# Patient Record
Sex: MALE | Race: WHITE | Employment: OTHER | URBAN - METROPOLITAN AREA
[De-identification: names, ages, dates, MRNs, and addresses within clinical notes are randomized per-mention and may not be internally consistent; named-entity substitution may affect disease eponyms.]

---

## 2019-02-14 ENCOUNTER — TELEPHONE (OUTPATIENT)
Dept: NEUROLOGY | Age: 79
End: 2019-02-14

## 2019-02-14 NOTE — TELEPHONE ENCOUNTER
----- Message from Kathy Kinney sent at 2/14/2019  1:20 PM EST -----  Regarding: Dr. Jacey Hoskins  Pt's wife(Anca Messer) requested a call from the nurse regarding issues with the pt, and seeing the retina specialist. Best contact number [phone].

## 2019-02-15 NOTE — TELEPHONE ENCOUNTER
----- Message from Lyle Vazquez sent at 2/15/2019  8:14 AM EST -----  Regarding: Dr. Kayce Stroud  The patient's wife Emir Lo is letting the doctor know that the patient had a stroke Tuesday and is in the [facility] Neurology ICU. [phone]

## 2019-03-12 ENCOUNTER — OFFICE VISIT (OUTPATIENT)
Dept: NEUROLOGY | Age: 79
End: 2019-03-12

## 2019-03-12 VITALS
BODY MASS INDEX: 25.73 KG/M2 | DIASTOLIC BLOOD PRESSURE: 64 MMHG | WEIGHT: 190 LBS | SYSTOLIC BLOOD PRESSURE: 138 MMHG | TEMPERATURE: 98.7 F | HEIGHT: 72 IN | HEART RATE: 52 BPM | OXYGEN SATURATION: 99 % | RESPIRATION RATE: 16 BRPM

## 2019-03-12 DIAGNOSIS — I61.1 NONTRAUMATIC CORTICAL HEMORRHAGE OF RIGHT CEREBRAL HEMISPHERE (HCC): Primary | ICD-10-CM

## 2019-03-12 NOTE — PROGRESS NOTES
[facility] Neurology Clinics and [address] at [facility] Neurology Clinics at [facility] [address] [place], [address] [facility] [address] [facility], [address] [place] [address]  [phone] Office  [phone] Facsimile           Referring:     Chief Complaint   Patient presents with    Cerebrovascular Accident     last seen 2016. Stroke happened on 2/11/19, admitted to [facility] on 2/14/19 and was in neuro ICU until 2/19/19. Working with Fredy Robert Stuart now     67-year-old left-handed gentleman who presents today for evaluation of what he calls stroke. Review of the electronic medical record finds this patient was last seen in February 2016 for attention deficit disorder. He tried some medication which made him feel poorly and he stopped that. He was hospitalized at [facility] Feb 14 and hospitalized until Feb 19 and he had ICH. He has been working with [facility]. His wife provides most of the history today. She notes that he was acting strangely around the 11th or so of February. She said he just was not acting himself and said that he could not see well. They went to see their eye doctor who referred him to a retina specialist.  That appointment was for about 2 weeks later. They were talking with some friends about this and his behavior was getting worse. They then went to see Dr. Maddison Kraft on the 14th and called our office as well and Dr. Maddison Kraft in our office as well as it for him to go to the emergency department and he went to the [facility] on the 14th. He was diagnosed with an intracerebral hemorrhage in the occipital lobe. He was transferred to [facility] where he stayed in the ICU for 5 days. He was seen by Dr. Brian Ochoa. He did not require surgery. While hospitalized he apparently underwent MRI of the brain and MRA of the intracranial circulation as well as a carotid Doppler.   He was said to have insignificant carotid stenosis. No reason was given for his hemorrhage however to his wife's recollection. They did follow-up with optometry and had visual fields and they bring that today. He has not had a follow-up head CT since being discharged. He has been getting home health speech physical and occupational therapy. His wife indicates they will be recommending outpatient therapy to start soon. She indicates his memory is worse. He is not had any recent fall. No chest pain or palpitations. No shortness of breath. Review of records kindly provided Dr. Maximilian Wright finds patient on aspirin 81 mg and tapering tobacco with rare falls. [facility] optometry records from Dr. Josie Worrell finds left homonymous bilateral field defects and she did include the visual field diagram.  She also indicates that he has type 2 diabetes with severe diabetic retinopathy and he came in to evaluate his visual field loss as he recently underwent hospitalization for stroke. Past Medical History:   Diagnosis Date    Depression     Diabetes ([facility] [address] [facility]     Falls 4/26/2013    Hearing loss 4/26/2013    [facility] 02/11/[facility]    Memory loss 4/26/2013    Ringing in ears 4/26/2013    Stroke [facility])        Past Surgical History:   Procedure Laterality Date    HX CHOLECYSTECTOMY      NEUROLOGICAL PROCEDURE UNLISTED         Current Outpatient Medications   Medication Sig Dispense Refill    lisinopril (PRINIVIL, ZESTRIL) 20 mg tablet Take  by mouth daily.  aspirin delayed-release 81 mg tablet Take  by mouth daily.  insulin lispro (HUMALOG) 100 unit/mL injection 4 Units by SubCUTAneous route as needed.  insulin glargine (LANTUS) 100 unit/mL injection 11 Units by SubCUTAneous route nightly.           Allergies   Allergen Reactions    Bactrim [Sulfamethoprim] Unknown (comments)    Dilantin [Phenytoin Sodium Extended] Unknown (comments)    Doxycycline Unknown (comments)    Lipitor [Atorvastatin] Myalgia  Pravastatin Myalgia    Sertraline Unknown (comments)    Sulfur Diarrhea    Tegretol [Carbamazepine] Unknown (comments)    Zocor [Simvastatin] Myalgia       Social History     Tobacco Use    Smoking status: Current Every Day Smoker    Smokeless tobacco: Never Used   Substance Use Topics    Alcohol use: No    Drug use: No       Family History   Problem Relation Age of Onset    Dementia Maternal Grandfather     No Known Problems Mother     No Known Problems Father        Review of Systems  Pertinent positives and negatives as noted with remainder of comprehensive review negative    Examination  Visit Vitals  Ht 6' (1.829 m)   Wt 86.2 kg (190 lb)   BMI 25.77 kg/m²     Pleasant well-dressed gentleman with no scleral icterus. His heart is regular. His pulses are symmetrical.  Oropharynx is clear. He has no edema of the lower extremities. He does wear hearing aids. Neurologically he is awake alert oriented and conversant. He has normal speech and language. Follows all commands. He does need some hints to recall the president and the devious president's name. He has full versions. Face is symmetrical.  Tongue and palate are midline. Visual fields are difficult due to attention. He has right upper extremity weakness with pronation and drifting. Left upper and lower extremity full to direct. Right upper extremity 4/5 throughout. Left lower extremity full. Reflexes depressed throughout. Finger-nose-finger done well    Impression/Plan  New hemorrhagic infarct as noted above and we will request records from [facility]. Request records from his home health therapy as well. Update CT scan for continued resolution of that blood    EEG for epileptiform abnormalities    Continue with therapies. Follow-up after studies    Saida Jason MD    This note was created using voice recognition software. Despite editing, there may be syntax errors.  This note will not be viewable in MyChart.

## 2019-03-12 NOTE — PROGRESS NOTES
Chief Complaint   Patient presents with    Cerebrovascular Accident     last seen 2016. Stroke happened on 2/11/19, admitted to [facility] on 2/14/19 and was in neuro ICU until 2/19/19.   Working with [facility] now       Records from Endocrine and Isidro Blankenship brought to appt

## 2019-03-12 NOTE — PATIENT INSTRUCTIONS

## 2019-03-15 ENCOUNTER — HOSPITAL ENCOUNTER (OUTPATIENT)
Dept: NEUROLOGY | Age: 79
Discharge: HOME | End: 2019-03-15
Attending: PSYCHIATRY & NEUROLOGY
Payer: MEDICARE

## 2019-03-15 ENCOUNTER — HOSPITAL ENCOUNTER (OUTPATIENT)
Dept: CT IMAGING | Age: 79
Discharge: HOME | End: 2019-03-15
Attending: PSYCHIATRY & NEUROLOGY
Payer: MEDICARE

## 2019-03-15 DIAGNOSIS — I61.1 NONTRAUMATIC CORTICAL HEMORRHAGE OF RIGHT CEREBRAL HEMISPHERE (HCC): ICD-10-CM

## 2019-03-15 PROCEDURE — 95816 EEG AWAKE AND DROWSY: CPT

## 2019-03-15 PROCEDURE — 70450 CT HEAD/BRAIN W/O DYE: CPT

## 2019-03-18 NOTE — PROCEDURES
Corby [facility] [address] [address]  EEG    Name:  Steven Mason  MR#:  [id number]  :  1940  ACCOUNT #:  [de-identified]  DATE OF SERVICE:  03/15/2019      REFERRING PROVIDER:  Belem Landrum    CLINICAL HISTORY:  EEG is requested on this 69year-old to evaluate for epileptiform abnormalities. MEDICATIONS:  Listed as lisinopril, insulin, aspirin. EEG REPORT:  This tracing is obtained during the awake state. During wakefulness, there are brief intermittent runs of posteriorly dominant and symmetric low-to-medium amplitude 9-cycle per second activities which attenuate with eye opening. Lower voltage faster frequency activities are seen symmetrically over the anterior head regions. Hyperventilation not performed secondary to his age and history of stroke. Intermittent photic stimulation little alters the tracing. Sleep is not attained. INTERPRETATION:  This EEG recorded during the awake state is normal.  No epileptiform abnormalities are seen.       Dg Saeed MD      [id number]  D:  2019 17:21  T:  2019 9:01  JOB #:  [id number]  CC:

## 2019-04-09 ENCOUNTER — OFFICE VISIT (OUTPATIENT)
Dept: NEUROLOGY | Age: 79
End: 2019-04-09

## 2019-04-09 VITALS
RESPIRATION RATE: 16 BRPM | HEART RATE: 52 BPM | BODY MASS INDEX: 27.2 KG/M2 | HEIGHT: 70 IN | DIASTOLIC BLOOD PRESSURE: 64 MMHG | OXYGEN SATURATION: 99 % | SYSTOLIC BLOOD PRESSURE: 144 MMHG | WEIGHT: 190 LBS | TEMPERATURE: 97.6 F

## 2019-04-09 DIAGNOSIS — I61.1 NONTRAUMATIC CORTICAL HEMORRHAGE OF RIGHT CEREBRAL HEMISPHERE (HCC): Primary | ICD-10-CM

## 2019-04-09 DIAGNOSIS — F02.818 LATE ONSET ALZHEIMER'S DISEASE WITH BEHAVIORAL DISTURBANCE (HCC): ICD-10-CM

## 2019-04-09 DIAGNOSIS — G30.1 LATE ONSET ALZHEIMER'S DISEASE WITH BEHAVIORAL DISTURBANCE (HCC): ICD-10-CM

## 2019-04-09 RX ORDER — DONEPEZIL HYDROCHLORIDE 5 MG/1
5 TABLET, FILM COATED ORAL DAILY
Qty: 30 TAB | Refills: 0 | Status: SHIPPED | OUTPATIENT
Start: 2019-04-09 | End: 2019-05-09

## 2019-04-09 RX ORDER — DONEPEZIL HYDROCHLORIDE 10 MG/1
10 TABLET, FILM COATED ORAL DAILY
Qty: 90 TAB | Refills: 3 | Status: SHIPPED | OUTPATIENT
Start: 2019-04-09 | End: 2020-04-22

## 2019-04-09 NOTE — PROGRESS NOTES
[facility] Neurology Clinics and [address] at [facility] Neurology Clinics at [address] [place], [address] [facility] [address] [facility], [address] [place] [address]  
[phone] Chief Complaint Patient presents with  Results  
  eeg, ct  
 
Current Outpatient Medications Medication Sig Dispense Refill  lisinopril (PRINIVIL, ZESTRIL) 20 mg tablet Take  by mouth daily.  insulin lispro (HUMALOG) 100 unit/mL injection 4 Units by SubCUTAneous route as needed.  insulin glargine (LANTUS) 100 unit/mL injection 11 Units by SubCUTAneous route nightly.  aspirin delayed-release 81 mg tablet Take  by mouth daily. Allergies Allergen Reactions  Bactrim [Sulfamethoprim] Unknown (comments)  Dilantin [Phenytoin Sodium Extended] Unknown (comments)  Doxycycline Unknown (comments)  Lipitor [Atorvastatin] Myalgia  Pravastatin Myalgia  Sertraline Unknown (comments)  Sulfur Diarrhea  Tegretol [Carbamazepine] Unknown (comments)  Zocor [Simvastatin] Myalgia Social History Tobacco Use  Smoking status: Current Every Day Smoker  Smokeless tobacco: Never Used Substance Use Topics  Alcohol use: No  
 Drug use: No  
 
Patient returns today for follow-up. I saw him after he was hospitalized intracerebral hemorrhage. Records have been requested but not yet admitted to the media section electronic medical record. EEG was performed and personally reviewed and normal.  CT scan demonstrates a resolving parenchymal hematoma in the right posterior parieto-occipital lobe measuring approximately 3 cm. Since his last visit he reports he is doing well. Going to OP therapy His wife indicates he is not doing so well. He is having more functional difficulty--using omputer, TV remote, etc.  Has trouble with insulin and BG checks. She is a loss for how to handle his cognitive decline. We did send him previously for neuropsychological evaluation and he frustrated during testing and was unable to complete it. His wife says she needs help in terms of how to deal with his decline. She notes he gets on the computer and she is afraid he is going to give away their personal information on the Internet. He blames the TV when he cannot use the remote. He is repeating questions over and over. He does not remember conversations even an hour after they happen. He would go to bed at 6 PM and awakens at 3 AM keeping her up. No dangerous behaviors. He does get verbally abusive she notes. Examination Visit Vitals /64 (BP 1 Location: Right arm, BP Patient Position: Sitting) Pulse (!) 52 Temp 97.6 °F (36.4 °C) (Oral) Resp 16 Ht 5' 10\" (1.778 m) Wt 86.2 kg (190 lb) SpO2 99% BMI 27.26 kg/m² He is awake and alert wearing his hearing aid. Appropriate dress and appropriate grooming. He follows all commands. No pronation or drift. Steady with his cane Impression/Plan Resolving intracerebral hemorrhage--continue to modify risk factors. Old aspirin for now and I keep him off of aspirin for at least another 3 months or so. Continued cognitive decline as noted above. Discussed at length and his wife. We will start Aricept. Discussed administration, potential benefits, and alternatives. Discussed that this will not make his memory better is to stabilize and slow decline. Discussed behavioral modification techniques. Discussed when to argue about also to speak. We will arrange for her to see the social workers for the Alzheimer's [employer] over the dementia clinic at [facility] discussed strategies and coping skills. We discussed supervision particularly of blood sugars.   Asked her to discuss with Dr. Russel Carrel using the patch type glucose meter and they do indicate they have already switched to using the pen type insulin due to his decreased motor skills. Follow-up in 10 weeks to see how he is tolerating Aricept Jeannie Delarosa MD 
 
Total time: 30 min Counseling / coordination time: 20 min  
> 50% counseling / coordination?: Yes re: as documented above This note was created using voice recognition software. Despite editing, there may be syntax errors. This note will not be viewable in [address]

## 2019-04-09 NOTE — PROGRESS NOTES
Chief Complaint Patient presents with  Results  
  eeg, ct Coordination of Care Questions 1. Have you been to the ER, urgent care clinic outside of [facility] since your last visit? No  
    Hospitalized since your last visit? No 
 
2. Have you seen or consulted any other health care providers outside of the [address] [place] [address] since your last visit? Include any pap smears or colon screening.  No

## 2019-05-28 ENCOUNTER — TELEPHONE (OUTPATIENT)
Dept: NEUROLOGY | Age: 79
End: 2019-05-28

## 2019-05-28 NOTE — TELEPHONE ENCOUNTER
Pt notified it is most likely r/t abx per Dr. Ashleigh Guillory. Advised pt's wife to get yogurt and probiotic to replenish some of the bacteria in the gut lost with doxy. She was also advised to contact pcp if diarrhea is still ongoing as this could be something more serious than just medication SE. She agrees to do this and will start pt back on 5 mg donepezil after the diarrhea has dissipated as pt refuses med right now. She states she never heard from anyone regarding scheduling appt with [employer] [employer] at [facility], notified that I will forward msg to Raj who is now handling this.

## 2019-05-28 NOTE — TELEPHONE ENCOUNTER
Pt stated that he doesn't want to take the medication   donepezil (ARICEPT) 10 mg tablet  Pt stated that it gives him diarrhea.

## 2019-05-28 NOTE — TELEPHONE ENCOUNTER
Pt states the donepezil has been giving him diarrhea and he does not want to continue taking it. At the time he was taking it he was also taking an abx (doxicycline) and just finished that 2 days ago after taking for 10 days. Pt notified that this is most likely the abx but pt feels the diarrhea is from the donepezil since he had been off of abx for 2 days.

## 2019-05-29 ENCOUNTER — TELEPHONE (OUTPATIENT)
Dept: NEUROLOGY | Age: 79
End: 2019-05-29

## 2019-05-29 NOTE — TELEPHONE ENCOUNTER
----- Message from Leopold Nickels sent at 5/29/2019  9:47 AM EDT -----  Regarding: Dr. Wendi Rod  Mrs. Sovine pt's spouse, is calling to speak back with Fiorella Larois regarding pt's appt with Bryan Burnham at [facility]. [phone].

## 2019-06-04 ENCOUNTER — OFFICE VISIT (OUTPATIENT)
Dept: NEUROLOGY | Age: 79
End: 2019-06-04

## 2019-06-04 VITALS
WEIGHT: 185 LBS | OXYGEN SATURATION: 97 % | DIASTOLIC BLOOD PRESSURE: 62 MMHG | HEIGHT: 70 IN | BODY MASS INDEX: 26.48 KG/M2 | SYSTOLIC BLOOD PRESSURE: 186 MMHG | TEMPERATURE: 97.8 F | HEART RATE: 58 BPM | RESPIRATION RATE: 18 BRPM

## 2019-06-04 DIAGNOSIS — G30.1 LATE ONSET ALZHEIMER'S DISEASE WITH BEHAVIORAL DISTURBANCE (HCC): Primary | ICD-10-CM

## 2019-06-04 DIAGNOSIS — F02.818 LATE ONSET ALZHEIMER'S DISEASE WITH BEHAVIORAL DISTURBANCE (HCC): Primary | ICD-10-CM

## 2019-06-04 NOTE — PROGRESS NOTES
Chief Complaint   Patient presents with    Dementia     f/u     See tele 5/28/19 regarding donepezil. Pt d/c'd for a little while d/t diarrhea, restarted 5 mg on 5/31/19 and seems to be doing well thus far. Coordination of Care Questions    1. Have you been to the ER, urgent care clinic outside of [facility] since your last visit? No       Hospitalized since your last visit? No    2. Have you seen or consulted any other health care providers outside of the [address] [place] [address] since your last visit? Include any pap smears or colon screening.  No

## 2019-06-04 NOTE — PROGRESS NOTES
[facility] Neurology Clinics and [address] at [facility] Neurology Clinics at [address] [facility], [address] [facility] [address] [facility], [address] [place] [address]   [phone]              Chief Complaint   Patient presents with    Dementia     f/u     Current Outpatient Medications   Medication Sig Dispense Refill    donepezil (ARICEPT) 10 mg tablet Take 1 Tab by mouth daily. (Patient taking differently: Take 5 mg by mouth daily.) 90 Tab 3    lisinopril (PRINIVIL, ZESTRIL) 20 mg tablet Take  by mouth daily.  insulin lispro (HUMALOG) 100 unit/mL injection 4 Units by SubCUTAneous route as needed.  insulin glargine (LANTUS) 100 unit/mL injection 11 Units by SubCUTAneous route nightly.  aspirin delayed-release 81 mg tablet Take  by mouth daily. Allergies   Allergen Reactions    Bactrim [Sulfamethoprim] Unknown (comments)    Dilantin [Phenytoin Sodium Extended] Unknown (comments)    Doxycycline Unknown (comments)    Lipitor [Atorvastatin] Myalgia    Pravastatin Myalgia    Sertraline Unknown (comments)    Sulfur Diarrhea    Tegretol [Carbamazepine] Unknown (comments)    Zocor [Simvastatin] Myalgia     Social History     Tobacco Use    Smoking status: Current Every Day Smoker    Smokeless tobacco: Never Used   Substance Use Topics    Alcohol use: No    Drug use: No     Pt returns for follow up s/p CVA, ICH, and dementia  He is maintained on aricept  He had some diarrhea with doxy and stopped aricept and wife just restarted 5mg aricept  With the 5 mg no diarrhea. Wife has meticulous records to go through. Both say that his memory is better. His attitude is better as well. He is not as cantankerous. No dangerous behaviors. No wandering. Questions today about staying active, medications, decline etc. and all those were addressed.   They have a meeting set up with the [employer] to discuss community resources to keep her more active. No further signs of cerebrovascular ischemia    Examination  Visit Vitals  /62 (BP 1 Location: Left arm, BP Patient Position: Sitting)   Pulse (!) 58   Temp 97.8 °F (36.6 °C) (Oral)   Resp 18   Ht 5' 10\" (1.778 m)   Wt 83.9 kg (185 lb)   SpO2 97%   BMI 26.54 kg/m²     Probably dressed. Appropriate grooming. Interactive. Looks more bright than usual and he is engaging today. Good affect. No icterus. He is oriented to the correct month and year. He tells me the president went to [facility]. Registration is 3/3 and recall 2/3 at 3 minutes. Steady with a stick    Impression/Plan  Alzheimer's disease better with Aricept although some diarrhea when he went to the 10 mg and it was right around that same time he started doxycycline but per his wife's records he actually started the 10 mg and started the diarrhea before the doxycycline. We therefore will continue 5 mg daily for the next couple of months and have him come back and at that point we will rechallenge with the 10 mg. Questions and concerns answered. Encouraged him to meet with the Alzheimer[employer] folks. continue to modify modifiable risk factors for stroke    Stanley Byrd MD    Total time: 30 min   Counseling / coordination time: 20 min   > 50% counseling / coordination?: Yes re: as documented above    This note was created using voice recognition software. Despite editing, there may be syntax errors. This note will not be viewable in [address]

## 2019-10-15 ENCOUNTER — OFFICE VISIT (OUTPATIENT)
Dept: NEUROLOGY | Age: 79
End: 2019-10-15

## 2019-10-15 VITALS
SYSTOLIC BLOOD PRESSURE: 156 MMHG | WEIGHT: 196 LBS | HEART RATE: 50 BPM | OXYGEN SATURATION: 94 % | HEIGHT: 70 IN | BODY MASS INDEX: 28.06 KG/M2 | DIASTOLIC BLOOD PRESSURE: 58 MMHG | RESPIRATION RATE: 14 BRPM

## 2019-10-15 DIAGNOSIS — I65.21 STENOSIS OF RIGHT CAROTID ARTERY: Primary | ICD-10-CM

## 2019-10-15 NOTE — PATIENT INSTRUCTIONS
Preventing Falls: Care Instructions Your Care Instructions Getting around your home safely can be a challenge if you have injuries or health problems that make it easy for you to fall. Loose rugs and furniture in walkways are among the dangers for many older people who have problems walking or who have poor eyesight. People who have conditions such as arthritis, osteoporosis, or dementia also have to be careful not to fall. You can make your home safer with a few simple measures. Follow-up care is a key part of your treatment and safety. Be sure to make and go to all appointments, and call your doctor if you are having problems. It's also a good idea to know your test results and keep a list of the medicines you take. How can you care for yourself at home? Taking care of yourself · You may get dizzy if you do not drink enough water. To prevent dehydration, drink plenty of fluids, enough so that your urine is light yellow or clear like water. Choose water and other caffeine-free clear liquids. If you have kidney, heart, or liver disease and have to limit fluids, talk with your doctor before you increase the amount of fluids you drink. · Exercise regularly to improve your strength, muscle tone, and balance. Walk if you can. Swimming may be a good choice if you cannot walk easily. · Have your vision and hearing checked each year or any time you notice a change. If you have trouble seeing and hearing, you might not be able to avoid objects and could lose your balance. · Know the side effects of the medicines you take. Ask your doctor or pharmacist whether the medicines you take can affect your balance. Sleeping pills or sedatives can affect your balance. · Limit the amount of alcohol you drink. Alcohol can impair your balance and other senses. · Ask your doctor whether calluses or corns on your feet need to be removed.  If you wear loose-fitting shoes because of calluses or corns, you can lose your balance and fall. · Talk to your doctor if you have numbness in your feet. Preventing falls at home · Remove raised doorway thresholds, throw rugs, and clutter. Repair loose carpet or raised areas in the floor. · Move furniture and electrical cords to keep them out of walking paths. · Use nonskid floor wax, and wipe up spills right away, especially on ceramic tile floors. · If you use a walker or cane, put rubber tips on it. If you use crutches, clean the bottoms of them regularly with an abrasive pad, such as steel wool. · Keep your house well lit, especially [place], and outside walkways. Use night-lights in areas such as hallways and bathrooms. Add extra light switches or use remote switches (such as switches that go on or off when you clap your hands) to make it easier to turn lights on if you have to get up during the night. · Install sturdy handrails on stairways. · Move items in your cabinets so that the things you use a lot are on the lower shelves (about waist level). · Keep a cordless phone and a flashlight with new batteries by your bed. If possible, put a phone in each of the main rooms of your house, or carry a cell phone in case you fall and cannot reach a phone. Or, you can wear a device around your neck or wrist. You push a button that sends a signal for help. · Wear low-heeled shoes that fit well and give your feet good support. Use footwear with nonskid soles. Check the heels and soles of your shoes for wear. Repair or replace worn heels or soles. · Do not wear socks without shoes on wood floors. · Walk on the grass when the sidewalks are slippery. If you live in an area that gets snow and ice in the winter, sprinkle salt on slippery steps and sidewalks. Preventing falls in the bath · Install grab bars and nonskid mats inside and outside your shower or tub and near the toilet and sinks. · Use shower chairs and bath benches. · Use a hand-held shower head that will allow you to sit while showering. · Get into a tub or shower by putting the weaker leg in first. Get out of a tub or shower with your strong side first. 
· Repair loose toilet seats and consider installing a raised toilet seat to make getting on and off the toilet easier. · Keep your bathroom door unlocked while you are in the shower. Where can you learn more? Go to http://rubén-ericka.info/. Enter 0476 [id number] in the search box to learn more about \"Preventing Falls: Care Instructions. \" Current as of: November 7, 2018 Content Version: 12.2 © 0884-4507 [employer]. Care instructions adapted under license by [employer] (which disclaims liability or warranty for this information). If you have questions about a medical condition or this instruction, always ask your healthcare professional. [place] [address] any warranty or liability for your use of this information. How to Get Up Safely After a Fall: Care Instructions Your Care Instructions If you have injuries, health problems, or other reasons that may make it easy for you to fall at home, it is a good idea to learn how to get up safely after a fall. Learning how to get up correctly can help you avoid making an injury worse. Also, knowing what to do if you cannot get up can help you stay safe until help arrives. Follow-up care is a key part of your treatment and safety. Be sure to make and go to all appointments, and call your doctor if you are having problems. It's also a good idea to know your test results and keep a list of the medicines you take. How can you care for yourself after a fall? If you think you can get up First lie still for a few minutes and think about how you feel. If your body feels okay and you think you can get up safely, follow the rest of the steps below: 1. Look for a chair or other piece of furniture that is close to you. 2. Roll onto your side and rest. Roll by turning your head in the direction you want to roll, move your shoulder and arm, then hip and leg in the same direction. 3. Lie still for a moment to let your blood pressure adjust. 
4. Slowly push your upper body up, lift your head, and take a moment to rest. 
5. Slowly get up on your hands and knees, and crawl to the chair or other stable piece of furniture. 6. Put your hands on the chair. 7. Move one foot forward, and place it flat on the floor. Your other leg should be bent with the knee on the floor. 8. Rise slowly, turn your body, and sit in the chair. Stay seated for a bit and think about how you feel. Call for help. Even if you feel okay, let someone know what happened to you. You might not know that you have a serious injury. If you cannot get up 1. If you think you are injured after a fall or you cannot get up, try not to panic. 2. Call out for help. 3. If you have a phone within reach or you have an emergency call device, use it to call for help. 4. If you do not have a phone within reach, try to slide yourself toward it. If you cannot get to the phone, try to slide toward a door or window or a place where you think you can be heard. 5. [place] or use an object to make noise so someone might hear you. 6. If you can reach something that you can use for a pillow, place it under your head. Try to stay warm by covering yourself with a blanket or clothing while you wait for help. When should you call for help? Call 911 anytime you think you may need emergency care. For example, call if: 
  · You passed out (lost consciousness).  
  · You cannot get up after a fall.  
  · You have severe pain.  
 Call your doctor now or seek immediate medical care if: 
  · You have new or worse pain.  
  · You are dizzy or lightheaded.  
  · You hit your head.  
 Watch closely for changes in your health, and be sure to contact your doctor if:   · You do not get better as expected. Where can you learn more? Go to http://rubén-ericka.info/. Enter R434 in the search box to learn more about \"How to Get Up Safely After a Fall: Care Instructions. \" Current as of: November 7, 2018 Content Version: 12.2 © 9090-7208 [employer]. Care instructions adapted under license by [employer] (which disclaims liability or warranty for this information). If you have questions about a medical condition or this instruction, always ask your healthcare professional. [address] any warranty or liability for your use of this information.

## 2019-10-15 NOTE — PROGRESS NOTES
[facility] Neurology Clinics and [address] at [facility] Neurology Clinics at [address] [facility], [address] [facility] [address] [facility], [address] [place] [address]   [phone]              Chief Complaint   Patient presents with    Dementia     Current Outpatient Medications   Medication Sig Dispense Refill    lisinopril (PRINIVIL, ZESTRIL) 20 mg tablet Take  by mouth daily.  insulin lispro (HUMALOG) 100 unit/mL injection 4 Units by SubCUTAneous route as needed.  insulin glargine (LANTUS) 100 unit/mL injection 11 Units by SubCUTAneous route nightly.  donepezil (ARICEPT) 10 mg tablet Take 1 Tab by mouth daily. (Patient taking differently: Take 5 mg by mouth daily.) 90 Tab 3      Allergies   Allergen Reactions    Bactrim [Sulfamethoprim] Unknown (comments)    Dilantin [Phenytoin Sodium Extended] Unknown (comments)    Doxycycline Unknown (comments)    Lipitor [Atorvastatin] Myalgia    Pravastatin Myalgia    Sertraline Unknown (comments)    Sulfur Diarrhea    Tegretol [Carbamazepine] Unknown (comments)    Zocor [Simvastatin] Myalgia     Social History     Tobacco Use    Smoking status: Current Every Day Smoker    Smokeless tobacco: Never Used   Substance Use Topics    Alcohol use: No    Drug use: No   Patient returns today with his wife for follow-up. He has dementia, Alzheimer's type. He did not tolerate 10 mg of Aricept with diarrhea. He is on 5 mg. Wife is not yet met with the [employer] people as they were dealing with her son who is passing away. Sadly, he passed away on September 6. In any regard his schedule is getting back to normal now. Wife thinks that his memory may be a little worse. .  No dangerous behaviors. He is interested in getting back involved with the [employer]. He has not had any fall.   Wife says that he has times where he seems to stare that last for just a few seconds but she is able to get his attention. No focal deficits. He continues to think that the 10 mg of Aricept gives him diarrhea but his wife says that he of other reasons including when he gets into the cookies etc.  She would like to try the 10 mg again. We discussed the pros and cons of that. Discussed even using Exelon patch which would have less likelihood of causing diarrhea. Questions regarding medications and side effects. Questions regarding decline. Review of the chart finds carotid Doppler back in 2015 which demonstrated stenosis of the right ICA at 16-49%. Examination  Visit Vitals  /58 (BP 1 Location: Left arm, BP Patient Position: Sitting)   Pulse (!) 50   Resp 14   Ht 5' 10\" (1.778 m)   Wt 88.9 kg (196 lb)   SpO2 94%   BMI 28.12 kg/m²     Is awake alert to the day in day. Tells me that in the news of trying to peach the president. He calculates properly. No right left confusion. Follows all commands. Steady with a stick    Impression/Plan  Alzheimer's disease which I think is about stable. He has had some emotional turmoil etc.  Discussion as above. They would like to try the 10 mg Aricept again. If that consistently causes diarrhea and the diarrhea cannot be related to other issues then they will call me and we will send in a prescription for the Exelon patch 9.6 mg for him to try. Otherwise they will continue the 10 mg Aricept    Carotid stenosis as noted above and will go ahead and check a Doppler and we should to be prudent check these yearly or so    Follow-up in 6 months    Hood Ahumada MD    Total time: 30 min   Counseling / coordination time: 20 min   > 50% counseling / coordination?: Yes re: as documented above    This note was created using voice recognition software. Despite editing, there may be syntax errors. This note will not be viewable in [address]

## 2019-10-15 NOTE — PROGRESS NOTES
Chief Complaint   Patient presents with    Dementia     states he is \"thinking a whole lot better\" and remembering things that he previously forgotten. Wife states he has not improved. Currently on 5 mg donepezil and wife states she feels he needs 10 mg however, pt states it causes diarrhea. Just lost son in September and schedule has been thrown off.

## 2019-10-23 ENCOUNTER — TELEPHONE (OUTPATIENT)
Dept: NEUROLOGY | Age: 79
End: 2019-10-23

## 2020-04-22 RX ORDER — DONEPEZIL HYDROCHLORIDE 10 MG/1
TABLET, FILM COATED ORAL
Qty: 90 TAB | Refills: 2 | Status: SHIPPED | OUTPATIENT
Start: 2020-04-22 | End: 2020-09-21 | Stop reason: SDUPTHER

## 2020-06-11 ENCOUNTER — OFFICE VISIT (OUTPATIENT)
Dept: NEUROLOGY | Age: 80
End: 2020-06-11

## 2020-06-11 VITALS
OXYGEN SATURATION: 97 % | BODY MASS INDEX: 28.06 KG/M2 | HEART RATE: 61 BPM | TEMPERATURE: 98.3 F | RESPIRATION RATE: 16 BRPM | WEIGHT: 195.99 LBS | HEIGHT: 70 IN | DIASTOLIC BLOOD PRESSURE: 76 MMHG | SYSTOLIC BLOOD PRESSURE: 150 MMHG

## 2020-06-11 DIAGNOSIS — F02.818 LATE ONSET ALZHEIMER'S DISEASE WITH BEHAVIORAL DISTURBANCE (HCC): Primary | ICD-10-CM

## 2020-06-11 DIAGNOSIS — R26.9 GAIT ABNORMALITY: ICD-10-CM

## 2020-06-11 DIAGNOSIS — G30.1 LATE ONSET ALZHEIMER'S DISEASE WITH BEHAVIORAL DISTURBANCE (HCC): Primary | ICD-10-CM

## 2020-06-11 DIAGNOSIS — I65.21 STENOSIS OF RIGHT CAROTID ARTERY: ICD-10-CM

## 2020-06-11 DIAGNOSIS — G47.9 SLEEP DISTURBANCE: ICD-10-CM

## 2020-06-11 RX ORDER — MEMANTINE HYDROCHLORIDE 5 MG/1
5 TABLET ORAL 2 TIMES DAILY
Qty: 60 TAB | Refills: 5 | Status: SHIPPED | OUTPATIENT
Start: 2020-06-11 | End: 2020-09-21 | Stop reason: SDUPTHER

## 2020-06-11 NOTE — PROGRESS NOTES
[facility] Neurology Clinics and [address] at [facility] Neurology Clinics at [address] [facility], [address] [facility] [address] [facility], [address] [place] [address]   [phone]              Chief Complaint   Patient presents with    Dementia     6 mo     Current Outpatient Medications   Medication Sig Dispense Refill    donepeziL (ARICEPT) 10 mg tablet TAKE ONE TABLET BY MOUTH DAILY (Patient taking differently: Take 5 mg by mouth nightly.) 90 Tab 2    lisinopril (PRINIVIL, ZESTRIL) 20 mg tablet Take  by mouth daily.  insulin lispro (HUMALOG) 100 unit/mL injection 4 Units by SubCUTAneous route as needed.  insulin glargine (LANTUS) 100 unit/mL injection 11 Units by SubCUTAneous route nightly. Allergies   Allergen Reactions    Bactrim [Sulfamethoprim] Unknown (comments)    Dilantin [Phenytoin Sodium Extended] Unknown (comments)    Doxycycline Unknown (comments)    Lipitor [Atorvastatin] Myalgia    Pravastatin Myalgia    Sertraline Unknown (comments)    Sulfur Diarrhea    Tegretol [Carbamazepine] Unknown (comments)    Zocor [Simvastatin] Myalgia     Social History     Tobacco Use    Smoking status: Current Every Day Smoker    Smokeless tobacco: Never Used   Substance Use Topics    Alcohol use: No    Drug use: No   58-year-old gentleman returns today with his wife for follow-up Alzheimer's. He is been intolerant of higher doses of Aricept. He can tolerate the 5 mg. Patient's wife called the office prior to the appointment today noting he is agitated and thinks is getting worse. She does not want to talk about it in front of him. He says he does get frustrated at times particularly when he cannot find things. He has taken several falls. He is in physical therapy right now. He has some bruising. No fever. No recent illness. No chills. No cough. No chest pain. No palpitations. No focal weakness.   He says that he does not sleep well. He says he goes to bed at 6 PM and then he gets up at midnight. We discussed having a more regular sleep schedule. He also has carotid stenosis. We did a Doppler last visit and that demonstrated 16-49% on the right and about 50% on the left. This was performed October 2019. Review of systems  Pertinent positives and negatives as noted with remainder of comprehensive review negative    Examination  Visit Vitals  /76 (BP 1 Location: Left arm, BP Patient Position: Sitting)   Pulse 61   Temp 98.3 °F (36.8 °C)   Resp 16   Ht 5' 10\" (1.778 m)   Wt 88.9 kg (195 lb 15.8 oz)   SpO2 97%   BMI 28.12 kg/m²     Pleasant and interactive. Appropriately dressed. Ecchymoses on his upper extremities. No scleral icterus. Oropharynx is clear moist.  He is awake and alert. He is oriented to March 2018 and he knows the president's name when I give him the first name. He says in the news there is all kinds of bad stuff with kids breaking into businesses and burning things and tearing down statues. He is referring to this. He has full versions. Symmetric face. Tongue is midline. No pronation or drift. Resists in all extremities. Walks with a cane    Impression/Plan  Alzheimer's type dementia with agitation  Continue Aricept 5 mg  Start Namenda 5 mg twice daily    Gait disorder continue with his assistive device and therapy    Sleep disturbance which is I think a byproduct of him going to bed so early. We discussed starting to go to bed a bit later each night until he goes to bed at a more reasonable hour and can sleep until a more reasonable hour in the morning. Carotid stenosis left greater than right--repeat Doppler in October    Follow-up in about 3 months    Stormy Giles MD      This note was created using voice recognition software. Despite editing, there may be syntax errors. This note will not be viewable in [address]

## 2020-06-11 NOTE — LETTER
6/11/20 Patient: Mahamed Rosales YOB: 1940 Date of Visit: 6/11/2020 Lynette Pham MD 
[address] [facility] [place] [address] [id number] VIA Facsimile: [phone] Dear Lynette Pham MD, Thank you for referring Mr. Julian Chiang to [facility] for evaluation. My notes for this consultation are attached. If you have questions, please do not hesitate to call me. I look forward to following your patient along with you. Sincerely, Christie Patino MD

## 2020-06-11 NOTE — PATIENT INSTRUCTIONS
Preventing Falls: Care Instructions Your Care Instructions Getting around your home safely can be a challenge if you have injuries or health problems that make it easy for you to fall. Loose rugs and furniture in walkways are among the dangers for many older people who have problems walking or who have poor eyesight. People who have conditions such as arthritis, osteoporosis, or dementia also have to be careful not to fall. You can make your home safer with a few simple measures. Follow-up care is a key part of your treatment and safety. Be sure to make and go to all appointments, and call your doctor if you are having problems. It's also a good idea to know your test results and keep a list of the medicines you take. How can you care for yourself at home? Taking care of yourself · You may get dizzy if you do not drink enough water. To prevent dehydration, drink plenty of fluids, enough so that your urine is light yellow or clear like water. Choose water and other caffeine-free clear liquids. If you have kidney, heart, or liver disease and have to limit fluids, talk with your doctor before you increase the amount of fluids you drink. · Exercise regularly to improve your strength, muscle tone, and balance. Walk if you can. Swimming may be a good choice if you cannot walk easily. · Have your vision and hearing checked each year or any time you notice a change. If you have trouble seeing and hearing, you might not be able to avoid objects and could lose your balance. · Know the side effects of the medicines you take. Ask your doctor or pharmacist whether the medicines you take can affect your balance. Sleeping pills or sedatives can affect your balance. · Limit the amount of alcohol you drink. Alcohol can impair your balance and other senses. · Ask your doctor whether calluses or corns on your feet need to be removed.  If you wear loose-fitting shoes because of calluses or corns, you can lose your balance and fall. · Talk to your doctor if you have numbness in your feet. Preventing falls at home · Remove raised doorway thresholds, throw rugs, and clutter. Repair loose carpet or raised areas in the floor. · Move furniture and electrical cords to keep them out of walking paths. · Use nonskid floor wax, and wipe up spills right away, especially on ceramic tile floors. · If you use a walker or cane, put rubber tips on it. If you use crutches, clean the bottoms of them regularly with an abrasive pad, such as steel wool. · Keep your house well lit, especially [place], and outside walkways. Use night-lights in areas such as hallways and bathrooms. Add extra light switches or use remote switches (such as switches that go on or off when you clap your hands) to make it easier to turn lights on if you have to get up during the night. · Install sturdy handrails on stairways. · Move items in your cabinets so that the things you use a lot are on the lower shelves (about waist level). · Keep a cordless phone and a flashlight with new batteries by your bed. If possible, put a phone in each of the main rooms of your house, or carry a cell phone in case you fall and cannot reach a phone. Or, you can wear a device around your neck or wrist. You push a button that sends a signal for help. · Wear low-heeled shoes that fit well and give your feet good support. Use footwear with nonskid soles. Check the heels and soles of your shoes for wear. Repair or replace worn heels or soles. · Do not wear socks without shoes on wood floors. · Walk on the grass when the sidewalks are slippery. If you live in an area that gets snow and ice in the winter, sprinkle salt on slippery steps and sidewalks. Preventing falls in the bath · Install grab bars and nonskid mats inside and outside your shower or tub and near the toilet and sinks. · Use shower chairs and bath benches. · Use a hand-held shower head that will allow you to sit while showering. · Get into a tub or shower by putting the weaker leg in first. Get out of a tub or shower with your strong side first. 
· Repair loose toilet seats and consider installing a raised toilet seat to make getting on and off the toilet easier. · Keep your bathroom door unlocked while you are in the shower. Where can you learn more? Go to http://rubén-ericka.info/ Enter 0476 [id number] in the search box to learn more about \"Preventing Falls: Care Instructions. \" Current as of: August 7, 2019               Content Version: 12.5 © 8012-4170 [employer]. Care instructions adapted under license by [employer] (which disclaims liability or warranty for this information). If you have questions about a medical condition or this instruction, always ask your healthcare professional. [address] any warranty or liability for your use of this information. How to Get Up Safely After a Fall: Care Instructions Your Care Instructions If you have injuries, health problems, or other reasons that may make it easy for you to fall at home, it is a good idea to learn how to get up safely after a fall. Learning how to get up correctly can help you avoid making an injury worse. Also, knowing what to do if you cannot get up can help you stay safe until help arrives. Follow-up care is a key part of your treatment and safety. Be sure to make and go to all appointments, and call your doctor if you are having problems. It's also a good idea to know your test results and keep a list of the medicines you take. How can you care for yourself after a fall? If you think you can get up First lie still for a few minutes and think about how you feel. If your body feels okay and you think you can get up safely, follow the rest of the steps below: 1. Look for a chair or other piece of furniture that is close to you. 2. Roll onto your side and rest. Roll by turning your head in the direction you want to roll, move your shoulder and arm, then hip and leg in the same direction. 3. Lie still for a moment to let your blood pressure adjust. 
4. Slowly push your upper body up, lift your head, and take a moment to rest. 
5. Slowly get up on your hands and knees, and crawl to the chair or other stable piece of furniture. 6. Put your hands on the chair. 7. Move one foot forward, and place it flat on the floor. Your other leg should be bent with the knee on the floor. 8. Rise slowly, turn your body, and sit in the chair. Stay seated for a bit and think about how you feel. Call for help. Even if you feel okay, let someone know what happened to you. You might not know that you have a serious injury. If you cannot get up 1. If you think you are injured after a fall or you cannot get up, try not to panic. 2. Call out for help. 3. If you have a phone within reach or you have an emergency call device, use it to call for help. 4. If you do not have a phone within reach, try to slide yourself toward it. If you cannot get to the phone, try to slide toward a door or window or a place where you think you can be heard. 5. [place] or use an object to make noise so someone might hear you. 6. If you can reach something that you can use for a pillow, place it under your head. Try to stay warm by covering yourself with a blanket or clothing while you wait for help. When should you call for help? [id number] anytime you think you may need emergency care. For example, call if: 
· You passed out (lost consciousness). · You cannot get up after a fall. · You have severe pain. Call your doctor now or seek immediate medical care if: 
· You have new or worse pain. · You are dizzy or lightheaded. · You hit your head. Watch closely for changes in your health, and be sure to contact your doctor if: 
· You do not get better as expected. Where can you learn more? Go to http://rubén-ericka.info/ Enter X751 in the search box to learn more about \"How to Get Up Safely After a Fall: Care Instructions. \" Current as of: August 7, 2019               Content Version: 12.5 © 1538-7552 [employer]. Care instructions adapted under license by [employer] (which disclaims liability or warranty for this information). If you have questions about a medical condition or this instruction, always ask your healthcare professional. [address] any warranty or liability for your use of this information. None

## 2020-08-06 ENCOUNTER — APPOINTMENT (OUTPATIENT)
Dept: CT IMAGING | Age: 80
End: 2020-08-06
Attending: EMERGENCY MEDICINE
Payer: MEDICARE

## 2020-08-06 ENCOUNTER — HOSPITAL ENCOUNTER (EMERGENCY)
Age: 80
Discharge: OTHER INSTITUTION | End: 2020-08-06
Attending: EMERGENCY MEDICINE | Admitting: EMERGENCY MEDICINE
Payer: MEDICARE

## 2020-08-06 ENCOUNTER — TELEPHONE (OUTPATIENT)
Dept: NEUROLOGY | Age: 80
End: 2020-08-06

## 2020-08-06 ENCOUNTER — HOSPITAL ENCOUNTER (INPATIENT)
Age: 80
LOS: 8 days | Discharge: NURSING, REHAB OR LONG-TERM CARE | DRG: 064 | End: 2020-08-14
Attending: INTERNAL MEDICINE | Admitting: INTERNAL MEDICINE
Payer: MEDICARE

## 2020-08-06 VITALS
TEMPERATURE: 98.2 F | RESPIRATION RATE: 17 BRPM | WEIGHT: 195 LBS | BODY MASS INDEX: 27.98 KG/M2 | DIASTOLIC BLOOD PRESSURE: 39 MMHG | HEART RATE: 61 BPM | OXYGEN SATURATION: 95 % | SYSTOLIC BLOOD PRESSURE: 149 MMHG

## 2020-08-06 DIAGNOSIS — I63.512 ACUTE ISCHEMIC LEFT MCA STROKE (HCC): ICD-10-CM

## 2020-08-06 DIAGNOSIS — R00.1 BRADYCARDIA: ICD-10-CM

## 2020-08-06 DIAGNOSIS — R94.31 ABNORMAL EKG: ICD-10-CM

## 2020-08-06 DIAGNOSIS — S06.340A TRAUMATIC HEMORRHAGE OF RIGHT CEREBRUM WITHOUT LOSS OF CONSCIOUSNESS, INITIAL ENCOUNTER (HCC): ICD-10-CM

## 2020-08-06 DIAGNOSIS — I10 ESSENTIAL HYPERTENSION: ICD-10-CM

## 2020-08-06 DIAGNOSIS — I61.9 HEMORRHAGIC STROKE (HCC): Primary | ICD-10-CM

## 2020-08-06 DIAGNOSIS — Z01.810 PRE-OPERATIVE CARDIOVASCULAR EXAMINATION: ICD-10-CM

## 2020-08-06 DIAGNOSIS — I67.1 ANTERIOR COMMUNICATING ARTERY ANEURYSM: ICD-10-CM

## 2020-08-06 LAB
ANION GAP SERPL CALC-SCNC: 3 MMOL/L (ref 5–15)
BASOPHILS # BLD: 0 K/UL (ref 0–0.1)
BASOPHILS NFR BLD: 0 % (ref 0–1)
BUN SERPL-MCNC: 30 MG/DL (ref 6–20)
BUN/CREAT SERPL: 19 (ref 12–20)
CALCIUM SERPL-MCNC: 8.6 MG/DL (ref 8.5–10.1)
CHLORIDE SERPL-SCNC: 108 MMOL/L (ref 97–108)
CO2 SERPL-SCNC: 29 MMOL/L (ref 21–32)
COMMENT, HOLDF: NORMAL
CREAT SERPL-MCNC: 1.55 MG/DL (ref 0.7–1.3)
DIFFERENTIAL METHOD BLD: NORMAL
EOSINOPHIL # BLD: 0.3 K/UL (ref 0–0.4)
EOSINOPHIL NFR BLD: 3 % (ref 0–7)
ERYTHROCYTE [DISTWIDTH] IN BLOOD BY AUTOMATED COUNT: 12.7 % (ref 11.5–14.5)
GLUCOSE SERPL-MCNC: 83 MG/DL (ref 65–100)
HCT VFR BLD AUTO: 40.5 % (ref 36.6–50.3)
HGB BLD-MCNC: 13.3 G/DL (ref 12.1–17)
IMM GRANULOCYTES # BLD AUTO: 0 K/UL (ref 0–0.04)
IMM GRANULOCYTES NFR BLD AUTO: 0 % (ref 0–0.5)
INR PPP: 1 (ref 0.9–1.1)
LYMPHOCYTES # BLD: 2.1 K/UL (ref 0.8–3.5)
LYMPHOCYTES NFR BLD: 22 % (ref 12–49)
MCH RBC QN AUTO: 32 PG (ref 26–34)
MCHC RBC AUTO-ENTMCNC: 32.8 G/DL (ref 30–36.5)
MCV RBC AUTO: 97.4 FL (ref 80–99)
MONOCYTES # BLD: 0.8 K/UL (ref 0–1)
MONOCYTES NFR BLD: 9 % (ref 5–13)
NEUTS SEG # BLD: 6 K/UL (ref 1.8–8)
NEUTS SEG NFR BLD: 66 % (ref 32–75)
NRBC # BLD: 0 K/UL (ref 0–0.01)
NRBC BLD-RTO: 0 PER 100 WBC
PLATELET # BLD AUTO: 211 K/UL (ref 150–400)
PMV BLD AUTO: 9.3 FL (ref 8.9–12.9)
POTASSIUM SERPL-SCNC: 4.5 MMOL/L (ref 3.5–5.1)
PROTHROMBIN TIME: 10.7 SEC (ref 9–11.1)
RBC # BLD AUTO: 4.16 M/UL (ref 4.1–5.7)
SAMPLES BEING HELD,HOLD: NORMAL
SODIUM SERPL-SCNC: 140 MMOL/L (ref 136–145)
WBC # BLD AUTO: 9.3 K/UL (ref 4.1–11.1)

## 2020-08-06 PROCEDURE — 96365 THER/PROPH/DIAG IV INF INIT: CPT

## 2020-08-06 PROCEDURE — 80048 BASIC METABOLIC PNL TOTAL CA: CPT

## 2020-08-06 PROCEDURE — 99285 EMERGENCY DEPT VISIT HI MDM: CPT

## 2020-08-06 PROCEDURE — 36415 COLL VENOUS BLD VENIPUNCTURE: CPT

## 2020-08-06 PROCEDURE — 93005 ELECTROCARDIOGRAM TRACING: CPT

## 2020-08-06 PROCEDURE — 70450 CT HEAD/BRAIN W/O DYE: CPT

## 2020-08-06 PROCEDURE — 85025 COMPLETE CBC W/AUTO DIFF WBC: CPT

## 2020-08-06 PROCEDURE — 74011000250 HC RX REV CODE- 250: Performed by: EMERGENCY MEDICINE

## 2020-08-06 PROCEDURE — 85610 PROTHROMBIN TIME: CPT

## 2020-08-06 PROCEDURE — 74011000258 HC RX REV CODE- 258: Performed by: EMERGENCY MEDICINE

## 2020-08-06 PROCEDURE — 74011250636 HC RX REV CODE- 250/636: Performed by: NURSE PRACTITIONER

## 2020-08-06 PROCEDURE — 65610000006 HC RM INTENSIVE CARE

## 2020-08-06 PROCEDURE — 74011000250 HC RX REV CODE- 250: Performed by: NURSE PRACTITIONER

## 2020-08-06 RX ORDER — POLYETHYLENE GLYCOL 3350 17 G/17G
17 POWDER, FOR SOLUTION ORAL DAILY PRN
Status: DISCONTINUED | OUTPATIENT
Start: 2020-08-06 | End: 2020-08-14 | Stop reason: HOSPADM

## 2020-08-06 RX ORDER — ACETAMINOPHEN 325 MG/1
650 TABLET ORAL
Status: DISCONTINUED | OUTPATIENT
Start: 2020-08-06 | End: 2020-08-14 | Stop reason: HOSPADM

## 2020-08-06 RX ORDER — ACETAMINOPHEN 650 MG/1
650 SUPPOSITORY RECTAL
Status: DISCONTINUED | OUTPATIENT
Start: 2020-08-06 | End: 2020-08-14 | Stop reason: HOSPADM

## 2020-08-06 RX ORDER — ONDANSETRON 2 MG/ML
4 INJECTION INTRAMUSCULAR; INTRAVENOUS
Status: DISCONTINUED | OUTPATIENT
Start: 2020-08-06 | End: 2020-08-14 | Stop reason: HOSPADM

## 2020-08-06 RX ORDER — PROMETHAZINE HYDROCHLORIDE 25 MG/1
12.5 TABLET ORAL
Status: DISCONTINUED | OUTPATIENT
Start: 2020-08-06 | End: 2020-08-14 | Stop reason: HOSPADM

## 2020-08-06 RX ORDER — DEXTROSE MONOHYDRATE 100 MG/ML
0-250 INJECTION, SOLUTION INTRAVENOUS AS NEEDED
Status: DISCONTINUED | OUTPATIENT
Start: 2020-08-06 | End: 2020-08-14 | Stop reason: HOSPADM

## 2020-08-06 RX ORDER — HYDRALAZINE HYDROCHLORIDE 20 MG/ML
10 INJECTION INTRAMUSCULAR; INTRAVENOUS
Status: COMPLETED | OUTPATIENT
Start: 2020-08-06 | End: 2020-08-09

## 2020-08-06 RX ORDER — LABETALOL HCL 20 MG/4 ML
5 SYRINGE (ML) INTRAVENOUS
Status: DISCONTINUED | OUTPATIENT
Start: 2020-08-06 | End: 2020-08-06

## 2020-08-06 RX ORDER — DONEPEZIL HYDROCHLORIDE 10 MG/1
10 TABLET, FILM COATED ORAL
Status: DISCONTINUED | OUTPATIENT
Start: 2020-08-07 | End: 2020-08-14 | Stop reason: HOSPADM

## 2020-08-06 RX ORDER — LABETALOL HCL 20 MG/4 ML
10 SYRINGE (ML) INTRAVENOUS
Status: DISCONTINUED | OUTPATIENT
Start: 2020-08-06 | End: 2020-08-06

## 2020-08-06 RX ORDER — LABETALOL HYDROCHLORIDE 5 MG/ML
10 INJECTION, SOLUTION INTRAVENOUS
Status: COMPLETED | OUTPATIENT
Start: 2020-08-06 | End: 2020-08-09

## 2020-08-06 RX ORDER — NALOXONE HYDROCHLORIDE 0.4 MG/ML
0.4 INJECTION, SOLUTION INTRAMUSCULAR; INTRAVENOUS; SUBCUTANEOUS AS NEEDED
Status: DISCONTINUED | OUTPATIENT
Start: 2020-08-06 | End: 2020-08-14 | Stop reason: HOSPADM

## 2020-08-06 RX ORDER — SODIUM CHLORIDE 0.9 % (FLUSH) 0.9 %
5-40 SYRINGE (ML) INJECTION EVERY 8 HOURS
Status: DISCONTINUED | OUTPATIENT
Start: 2020-08-07 | End: 2020-08-14 | Stop reason: HOSPADM

## 2020-08-06 RX ORDER — MEMANTINE HYDROCHLORIDE 5 MG/1
5 TABLET ORAL 2 TIMES DAILY
Status: DISCONTINUED | OUTPATIENT
Start: 2020-08-07 | End: 2020-08-14 | Stop reason: HOSPADM

## 2020-08-06 RX ORDER — INSULIN LISPRO 100 [IU]/ML
INJECTION, SOLUTION INTRAVENOUS; SUBCUTANEOUS EVERY 6 HOURS
Status: DISCONTINUED | OUTPATIENT
Start: 2020-08-07 | End: 2020-08-14 | Stop reason: HOSPADM

## 2020-08-06 RX ORDER — SODIUM CHLORIDE 0.9 % (FLUSH) 0.9 %
5-40 SYRINGE (ML) INJECTION AS NEEDED
Status: DISCONTINUED | OUTPATIENT
Start: 2020-08-06 | End: 2020-08-14 | Stop reason: HOSPADM

## 2020-08-06 RX ORDER — MAGNESIUM SULFATE 100 %
4 CRYSTALS MISCELLANEOUS AS NEEDED
Status: DISCONTINUED | OUTPATIENT
Start: 2020-08-06 | End: 2020-08-14 | Stop reason: HOSPADM

## 2020-08-06 RX ADMIN — SODIUM CHLORIDE 5 MG/HR: 900 INJECTION, SOLUTION INTRAVENOUS at 20:26

## 2020-08-06 RX ADMIN — SODIUM CHLORIDE 1 MG/HR: 900 INJECTION, SOLUTION INTRAVENOUS at 23:10

## 2020-08-06 NOTE — ED TRIAGE NOTES
Patient reports \" difficulty using my left arm\" with numbness and tingling since Tuesday. Dr. Renae Lopes to triage.  Wife reports increase in confusion    Van positive, left sided visual field neglect     History of a CVA, in PT for difficulty with balance

## 2020-08-06 NOTE — TELEPHONE ENCOUNTER
S/w pt's wife, she states pt had sudden increase in confusion along with left sided weakness. Advised pt's wife to take pt to ED as he needs eval for stroke. Pt verbalizes concern of COVID exposure. Reiterated to her that delaying tx for stroke is far riskier than risking COVID exposure.   She agrees and will be taking pt to [facility]

## 2020-08-06 NOTE — ED PROVIDER NOTES
This is a venous a 60-year-old gentleman with a history of right occipital hemorrhagic stroke who presents with left arm numbness and weakness for 2 days with associated clumsiness. His wife reports that since his stroke he has a history of left-sided visual field deficit as well as difficulty with balance and frequent falls; he fell today, which is not unusual for him. Patient denies other associated symptoms though history and physical are limited by patient's difficulty hearing. The history is provided by the patient and the spouse. Extremity Weakness    This is a new problem. The current episode started 2 days ago. The problem occurs constantly. The problem has not changed since onset. The pain is present in the left arm. The patient is experiencing no pain. Associated symptoms include numbness. He has tried nothing for the symptoms. There has been no history of extremity trauma.         Past Medical History:   Diagnosis Date    Depression     Diabetes ([facility] [address]     Falls 4/26/2013    Hearing loss 4/26/2013    Hemorrhagic stroke ([facility] [address] 02/11/2019    Memory loss 4/26/2013    Ringing in ears 4/26/2013    Stroke [facility])        Past Surgical History:   Procedure Laterality Date    HX CHOLECYSTECTOMY      NEUROLOGICAL PROCEDURE UNLISTED           Family History:   Problem Relation Age of Onset    Dementia Maternal Grandfather     No Known Problems Mother     No Known Problems Father        Social History     Socioeconomic History    Marital status:      Spouse name: Not on file    Number of children: Not on file    Years of education: Not on file    Highest education level: Not on file   Occupational History    Not on file   Social Needs    Financial resource strain: Not on file    Food insecurity     Worry: Not on file     Inability: Not on file    Transportation needs     Medical: Not on file     Non-medical: Not on file   Tobacco Use    Smoking status: Current Every Day Smoker    Smokeless tobacco: Never Used   Substance and Sexual Activity    Alcohol use: No    Drug use: No    Sexual activity: Not on file   Lifestyle    Physical activity     Days per week: Not on file     Minutes per session: Not on file    Stress: Not on file   Relationships    Social connections     Talks on phone: Not on file     Gets together: Not on file     Attends Denominational service: Not on file     Active member of club or organization: Not on file     Attends meetings of clubs or organizations: Not on file     Relationship status: Not on file    Intimate partner violence     Fear of current or ex partner: Not on file     Emotionally abused: Not on file     Physically abused: Not on file     Forced sexual activity: Not on file   Other Topics Concern    Not on file   Social History Narrative    Not on file         ALLERGIES: Bactrim [sulfamethoprim]; Dilantin [phenytoin sodium extended]; Doxycycline; Lipitor [atorvastatin]; Pravastatin; Sertraline; Sulfur; Tegretol [carbamazepine]; and Zocor [simvastatin]    Review of Systems   Neurological: Positive for weakness and numbness. Vitals:    08/06/20 1741   BP: 156/55   Pulse: 64   Resp: 18   Temp: 98.2 °F (36.8 °C)   SpO2: 96%   Weight: 88.5 kg (195 lb)            Physical Exam  Vitals signs and nursing note reviewed. Constitutional:       Appearance: He is well-developed. HENT:      Head: Normocephalic and atraumatic. Eyes:      General: No scleral icterus. Neck:      Musculoskeletal: Normal range of motion. Cardiovascular:      Rate and Rhythm: Normal rate. Pulmonary:      Effort: Pulmonary effort is normal.   Abdominal:      General: There is no distension. Skin:     General: Skin is warm and dry. Findings: No erythema or rash. Neurological:      Mental Status: He is alert and oriented to person, place, and time. Cranial Nerves: No cranial nerve deficit. Sensory: Sensory deficit (left arm) present.       Motor: Weakness (left arm) present. Coordination: Coordination abnormal (left arm). Psychiatric:         Mood and Affect: Mood normal.         Behavior: Behavior normal.          MDM  Number of Diagnoses or Management Options  Hemorrhagic stroke [facility]):   Diagnosis management comments: The patient is resting comfortably and feels better, is alert, talkative, interactive and in no distress. T    DDX: seizure, meningitis, stroke, sepsis, subarachnoid hemorrhage, intracranial bleeding, encephalitis              Procedures      6:54 PM  Case discussed with Dr. Jorge Alberto Hill, neurosurgery - he recommends admission to hospitalist or intensivist and he will see the patient as a consult. 7:33 PM  Pt accepted to [facility] ICU for Dr. Stefani Rodriguez    Patient is being admitted to the hospital.  The results of their tests and reasons for their admission have been discussed with them and/or available family. They convey agreement and understanding for the need to be admitted and for their admission diagnosis.      EKG at 2036  Normal sinus rhythm, 59 bpm, no STEMI, no ectopy  EKG interpreted by Evaristo Wilson MD

## 2020-08-07 ENCOUNTER — APPOINTMENT (OUTPATIENT)
Dept: CT IMAGING | Age: 80
DRG: 064 | End: 2020-08-07
Attending: NURSE PRACTITIONER
Payer: MEDICARE

## 2020-08-07 ENCOUNTER — APPOINTMENT (OUTPATIENT)
Dept: NON INVASIVE DIAGNOSTICS | Age: 80
DRG: 064 | End: 2020-08-07
Attending: NURSE PRACTITIONER
Payer: MEDICARE

## 2020-08-07 ENCOUNTER — APPOINTMENT (OUTPATIENT)
Dept: MRI IMAGING | Age: 80
DRG: 064 | End: 2020-08-07
Attending: NURSE PRACTITIONER
Payer: MEDICARE

## 2020-08-07 ENCOUNTER — APPOINTMENT (OUTPATIENT)
Dept: MRI IMAGING | Age: 80
DRG: 064 | End: 2020-08-07
Attending: PSYCHIATRY & NEUROLOGY
Payer: MEDICARE

## 2020-08-07 LAB
ANION GAP SERPL CALC-SCNC: 4 MMOL/L (ref 5–15)
APTT PPP: 30.9 SEC (ref 22.1–32)
ATRIAL RATE: 59 BPM
BASOPHILS # BLD: 0 K/UL (ref 0–0.1)
BASOPHILS NFR BLD: 1 % (ref 0–1)
BUN SERPL-MCNC: 25 MG/DL (ref 6–20)
BUN/CREAT SERPL: 22 (ref 12–20)
CALCIUM SERPL-MCNC: 7.7 MG/DL (ref 8.5–10.1)
CALCULATED P AXIS, ECG09: -4 DEGREES
CALCULATED R AXIS, ECG10: -2 DEGREES
CALCULATED T AXIS, ECG11: 21 DEGREES
CHLORIDE SERPL-SCNC: 113 MMOL/L (ref 97–108)
CHOLEST SERPL-MCNC: 106 MG/DL
CO2 SERPL-SCNC: 25 MMOL/L (ref 21–32)
CREAT SERPL-MCNC: 1.13 MG/DL (ref 0.7–1.3)
DIAGNOSIS, 93000: NORMAL
DIFFERENTIAL METHOD BLD: ABNORMAL
ECHO AO ROOT DIAM: 3.29 CM
ECHO AV AREA PEAK VELOCITY: 2.09 CM2
ECHO AV AREA/BSA PEAK VELOCITY: 1.1 CM2/M2
ECHO AV PEAK GRADIENT: 11.85 MMHG
ECHO AV PEAK VELOCITY: 172.13 CM/S
ECHO EST RA PRESSURE: 5 MMHG
ECHO LA AREA 4C: 21.84 CM2
ECHO LA MAJOR AXIS: 3.85 CM
ECHO LA MINOR AXIS: 2.01 CM
ECHO LA VOL 2C: 66.88 ML (ref 18–58)
ECHO LA VOL 4C: 63.31 ML (ref 18–58)
ECHO LA VOL BP: 72.23 ML (ref 18–58)
ECHO LA VOL/BSA BIPLANE: 37.75 ML/M2 (ref 16–28)
ECHO LA VOLUME INDEX A2C: 34.95 ML/M2 (ref 16–28)
ECHO LA VOLUME INDEX A4C: 33.09 ML/M2 (ref 16–28)
ECHO LV INTERNAL DIMENSION DIASTOLIC: 4.76 CM (ref 4.2–5.9)
ECHO LV INTERNAL DIMENSION SYSTOLIC: 3.14 CM
ECHO LV IVSD: 1.22 CM (ref 0.6–1)
ECHO LV MASS 2D: 199.9 G (ref 88–224)
ECHO LV MASS INDEX 2D: 104.5 G/M2 (ref 49–115)
ECHO LV POSTERIOR WALL DIASTOLIC: 1.05 CM (ref 0.6–1)
ECHO LVOT DIAM: 2.26 CM
ECHO LVOT PEAK GRADIENT: 3.19 MMHG
ECHO LVOT PEAK VELOCITY: 89.37 CM/S
ECHO MV A VELOCITY: 79.92 CM/S
ECHO MV AREA PHT: 2.32 CM2
ECHO MV E DECELERATION TIME (DT): 0.33 S
ECHO MV E VELOCITY: 69.22 CM/S
ECHO MV E/A RATIO: 0.87
ECHO MV PRESSURE HALF TIME (PHT): 0.1 S
ECHO PV PEAK INSTANTANEOUS GRADIENT SYSTOLIC: 4.34 MMHG
ECHO RIGHT VENTRICULAR SYSTOLIC PRESSURE (RVSP): 36.82 MMHG
ECHO RV INTERNAL DIMENSION: 5.22 CM
ECHO RV TAPSE: 2.74 CM (ref 1.5–2)
ECHO TV REGURGITANT MAX VELOCITY: 282.06 CM/S
ECHO TV REGURGITANT PEAK GRADIENT: 31.82 MMHG
EOSINOPHIL # BLD: 0.3 K/UL (ref 0–0.4)
EOSINOPHIL NFR BLD: 4 % (ref 0–7)
ERYTHROCYTE [DISTWIDTH] IN BLOOD BY AUTOMATED COUNT: 12.7 % (ref 11.5–14.5)
EST. AVERAGE GLUCOSE BLD GHB EST-MCNC: 140 MG/DL
GLUCOSE BLD STRIP.AUTO-MCNC: 102 MG/DL (ref 65–100)
GLUCOSE BLD STRIP.AUTO-MCNC: 158 MG/DL (ref 65–100)
GLUCOSE BLD STRIP.AUTO-MCNC: 168 MG/DL (ref 65–100)
GLUCOSE BLD STRIP.AUTO-MCNC: 88 MG/DL (ref 65–100)
GLUCOSE SERPL-MCNC: 79 MG/DL (ref 65–100)
HBA1C MFR BLD: 6.5 % (ref 4–5.6)
HCT VFR BLD AUTO: 35 % (ref 36.6–50.3)
HDLC SERPL-MCNC: 27 MG/DL
HDLC SERPL: 3.9 {RATIO} (ref 0–5)
HGB BLD-MCNC: 11.1 G/DL (ref 12.1–17)
IMM GRANULOCYTES # BLD AUTO: 0 K/UL (ref 0–0.04)
IMM GRANULOCYTES NFR BLD AUTO: 1 % (ref 0–0.5)
INR PPP: 1.1 (ref 0.9–1.1)
LDLC SERPL CALC-MCNC: 61.6 MG/DL (ref 0–100)
LIPID PROFILE,FLP: NORMAL
LYMPHOCYTES # BLD: 2.3 K/UL (ref 0.8–3.5)
LYMPHOCYTES NFR BLD: 29 % (ref 12–49)
MAGNESIUM SERPL-MCNC: 2.2 MG/DL (ref 1.6–2.4)
MCH RBC QN AUTO: 31.6 PG (ref 26–34)
MCHC RBC AUTO-ENTMCNC: 31.7 G/DL (ref 30–36.5)
MCV RBC AUTO: 99.7 FL (ref 80–99)
MONOCYTES # BLD: 0.7 K/UL (ref 0–1)
MONOCYTES NFR BLD: 8 % (ref 5–13)
NEUTS SEG # BLD: 4.8 K/UL (ref 1.8–8)
NEUTS SEG NFR BLD: 57 % (ref 32–75)
NRBC # BLD: 0 K/UL (ref 0–0.01)
NRBC BLD-RTO: 0 PER 100 WBC
P-R INTERVAL, ECG05: 200 MS
PHOSPHATE SERPL-MCNC: 3.2 MG/DL (ref 2.6–4.7)
PLATELET # BLD AUTO: 173 K/UL (ref 150–400)
PMV BLD AUTO: 9.4 FL (ref 8.9–12.9)
POTASSIUM SERPL-SCNC: 4 MMOL/L (ref 3.5–5.1)
PROTHROMBIN TIME: 11 SEC (ref 9–11.1)
Q-T INTERVAL, ECG07: 460 MS
QRS DURATION, ECG06: 114 MS
QTC CALCULATION (BEZET), ECG08: 455 MS
RBC # BLD AUTO: 3.51 M/UL (ref 4.1–5.7)
SERVICE CMNT-IMP: ABNORMAL
SERVICE CMNT-IMP: NORMAL
SODIUM SERPL-SCNC: 142 MMOL/L (ref 136–145)
THERAPEUTIC RANGE,PTTT: NORMAL SECS (ref 58–77)
TRIGL SERPL-MCNC: 87 MG/DL (ref ?–150)
VENTRICULAR RATE, ECG03: 59 BPM
VLDLC SERPL CALC-MCNC: 17.4 MG/DL
WBC # BLD AUTO: 8.1 K/UL (ref 4.1–11.1)

## 2020-08-07 PROCEDURE — 80061 LIPID PANEL: CPT

## 2020-08-07 PROCEDURE — 84100 ASSAY OF PHOSPHORUS: CPT

## 2020-08-07 PROCEDURE — 74011250636 HC RX REV CODE- 250/636: Performed by: NURSE PRACTITIONER

## 2020-08-07 PROCEDURE — 97530 THERAPEUTIC ACTIVITIES: CPT

## 2020-08-07 PROCEDURE — 83735 ASSAY OF MAGNESIUM: CPT

## 2020-08-07 PROCEDURE — 74011250637 HC RX REV CODE- 250/637: Performed by: NURSE PRACTITIONER

## 2020-08-07 PROCEDURE — 93306 TTE W/DOPPLER COMPLETE: CPT

## 2020-08-07 PROCEDURE — 97161 PT EVAL LOW COMPLEX 20 MIN: CPT

## 2020-08-07 PROCEDURE — 70544 MR ANGIOGRAPHY HEAD W/O DYE: CPT

## 2020-08-07 PROCEDURE — 70450 CT HEAD/BRAIN W/O DYE: CPT

## 2020-08-07 PROCEDURE — 65610000006 HC RM INTENSIVE CARE

## 2020-08-07 PROCEDURE — 80048 BASIC METABOLIC PNL TOTAL CA: CPT

## 2020-08-07 PROCEDURE — 85730 THROMBOPLASTIN TIME PARTIAL: CPT

## 2020-08-07 PROCEDURE — 92610 EVALUATE SWALLOWING FUNCTION: CPT

## 2020-08-07 PROCEDURE — 36415 COLL VENOUS BLD VENIPUNCTURE: CPT

## 2020-08-07 PROCEDURE — 97116 GAIT TRAINING THERAPY: CPT

## 2020-08-07 PROCEDURE — 51798 US URINE CAPACITY MEASURE: CPT

## 2020-08-07 PROCEDURE — 74011000258 HC RX REV CODE- 258: Performed by: NURSE PRACTITIONER

## 2020-08-07 PROCEDURE — 99223 1ST HOSP IP/OBS HIGH 75: CPT | Performed by: PSYCHIATRY & NEUROLOGY

## 2020-08-07 PROCEDURE — 82962 GLUCOSE BLOOD TEST: CPT

## 2020-08-07 PROCEDURE — 74011636637 HC RX REV CODE- 636/637: Performed by: NURSE PRACTITIONER

## 2020-08-07 PROCEDURE — 70547 MR ANGIOGRAPHY NECK W/O DYE: CPT

## 2020-08-07 PROCEDURE — 85025 COMPLETE CBC W/AUTO DIFF WBC: CPT

## 2020-08-07 PROCEDURE — 70551 MRI BRAIN STEM W/O DYE: CPT

## 2020-08-07 PROCEDURE — 83036 HEMOGLOBIN GLYCOSYLATED A1C: CPT

## 2020-08-07 PROCEDURE — 85610 PROTHROMBIN TIME: CPT

## 2020-08-07 PROCEDURE — 93306 TTE W/DOPPLER COMPLETE: CPT | Performed by: SPECIALIST

## 2020-08-07 RX ORDER — HYDROXYZINE HYDROCHLORIDE 10 MG/1
10 TABLET, FILM COATED ORAL ONCE
Status: COMPLETED | OUTPATIENT
Start: 2020-08-07 | End: 2020-08-07

## 2020-08-07 RX ORDER — MAG HYDROX/ALUMINUM HYD/SIMETH 200-200-20
SUSPENSION, ORAL (FINAL DOSE FORM) ORAL 2 TIMES DAILY
Status: DISCONTINUED | OUTPATIENT
Start: 2020-08-08 | End: 2020-08-14 | Stop reason: HOSPADM

## 2020-08-07 RX ORDER — HYDROXYZINE 25 MG/1
25 TABLET, FILM COATED ORAL ONCE
Status: COMPLETED | OUTPATIENT
Start: 2020-08-07 | End: 2020-08-07

## 2020-08-07 RX ADMIN — MEMANTINE 5 MG: 5 TABLET ORAL at 09:08

## 2020-08-07 RX ADMIN — INSULIN LISPRO 2 UNITS: 100 INJECTION, SOLUTION INTRAVENOUS; SUBCUTANEOUS at 12:07

## 2020-08-07 RX ADMIN — MEMANTINE 5 MG: 5 TABLET ORAL at 18:05

## 2020-08-07 RX ADMIN — INSULIN LISPRO 2 UNITS: 100 INJECTION, SOLUTION INTRAVENOUS; SUBCUTANEOUS at 18:05

## 2020-08-07 RX ADMIN — FAMOTIDINE 20 MG: 10 INJECTION, SOLUTION INTRAVENOUS at 00:20

## 2020-08-07 RX ADMIN — HYDROXYZINE HYDROCHLORIDE 25 MG: 25 TABLET, FILM COATED ORAL at 22:11

## 2020-08-07 RX ADMIN — LEVETIRACETAM 250 MG: 100 INJECTION, SOLUTION INTRAVENOUS at 00:20

## 2020-08-07 RX ADMIN — LEVETIRACETAM 250 MG: 100 INJECTION, SOLUTION INTRAVENOUS at 15:17

## 2020-08-07 RX ADMIN — HYDROXYZINE HYDROCHLORIDE 10 MG: 10 TABLET, FILM COATED ORAL at 20:21

## 2020-08-07 RX ADMIN — Medication 10 ML: at 00:19

## 2020-08-07 RX ADMIN — Medication 10 ML: at 05:55

## 2020-08-07 RX ADMIN — Medication 10 ML: at 22:02

## 2020-08-07 RX ADMIN — DONEPEZIL HYDROCHLORIDE 10 MG: 10 TABLET, FILM COATED ORAL at 22:02

## 2020-08-07 RX ADMIN — Medication 10 ML: at 15:18

## 2020-08-07 NOTE — PROGRESS NOTES
Critical Care Update    AM round and assessment of patient at bedside. Patient awake alert and oriented x 3. Off Cardene gtt. No complaints of pain, n/v, dizziness, or blurred vision. Patient to go to MRI today. Neurosurgery and Neurology following. Q1 hour neurochecks  Continue Keppra  PT/OT/Speech following  Multidisciplinary rounds completed.     Additional 20 mins CCT    Chely Espinosa [facility]     Critical Care Medicine  [employer]

## 2020-08-07 NOTE — PROGRESS NOTES
Spiritual Care Assessment/Progress Note  [facility]      NAME: Lester Darnell      MRN: [id number]  AGE: 67 y.o. SEX: male  Caodaism Affiliation: Latter day   Language: English     8/7/2020           Spiritual Assessment begun in [facility]S1 INTENSIVE CARE through conversation with:         []Patient        [x] Family    [] Friend(s)        Reason for Consult: Initial/Spiritual assessment, critical care     Spiritual beliefs: (Please include comment if needed)     [x] Identifies with a jose r tradition: Latter day         [] Supported by a jose r community:            [] Claims no spiritual orientation:           [] Seeking spiritual identity:                [] Adheres to an individual form of spirituality:           [] Not able to assess:                           Identified resources for coping:      [x] Prayer                               [] Music                  [] Guided Imagery     [x] Family/friends                 [] Pet visits     [] Devotional reading                         [] Unknown     [] Other:                                               Interventions offered during this visit: (See comments for more details)    Patient Interventions: Initial/Spiritual assessment, Critical care     Family/Friend(s):  Affirmation of emotions/emotional suffering, Catharsis/review of pertinent events in supportive environment, Initial Assessment, Prayer (assurance of)     Plan of Care:     [x] Support spiritual and/or cultural needs    [] Support AMD and/or advance care planning process      [] Support grieving process   [] Coordinate Rites and/or Rituals    [] Coordination with community clergy   [] No spiritual needs identified at this time   [] Detailed Plan of Care below (See Comments)  [] Make referral to Music Therapy  [] Make referral to Pet Therapy     [] Make referral to Addiction services  [] Make referral to [facility]  [] Make referral to Spiritual Care Partner  [] No future visits requested        [x] Follow up visits as needed     Comments: Attempted to visit Mr Luc Ba in  [address]. Patient was not in the room but his wife was present and stated he had been taken for an MRI. Ms Luc Ba appeared in pretty good spirits. Provided pastoral presence and active listening as patient's wife spoke of patient's lengthy history of health problems. She said that they had been  over [de-identified] years and that she was patient's primary care giver. Wife also stated that they had four children, one of whom had  from cancer this past September. Wife shared that they were Zoroastrian but had not attended Zoroastrian in quite a while; she said that she would like for  to visit patient when he is available and would like for them to be kept in prayer. Assured her of prayers on their behalf and of ongoing  availability for support. : Rev. Rashmi Figueroa.  Kellen Jon; [facility], to contact [phone] [address] [place] call: [phone]

## 2020-08-07 NOTE — PROGRESS NOTES
2140: TRANSFER - IN REPORT:    Verbal report received from [facility]) on [place]  being received from [facility]) for routine progression of care      Report consisted of patients Situation, Background, Assessment and   Recommendations(SBAR). Information from the following report(s) SBAR, Kardex, ED Summary, MAR, Recent Results, and Cardiac Rhythm NSR  was reviewed with the receiving nurse. Opportunity for questions and clarification was provided. Assessment completed upon patients arrival to unit and care assumed. 2310: Pt arrived onto the unit.   Primary Nurse Tosin Albright RN and Cj Darby RN performed a dual skin assessment on this patient No impairment noted  Sacrum red, blanchable  Raji score is 14

## 2020-08-07 NOTE — PROGRESS NOTES
0800: Bedside and Verbal shift change report given to Latrell Laws RN (oncoming nurse) by Nadia Rowell RN (offgoing nurse). Report included the following information SBAR, Kardex, Intake/Output, MAR, Recent Results and Cardiac Rhythm NSR. Or Sinus preet    0800:  Assessment completed. No new issues. Patient oriented x3 (not time), moves all extremities, follows commands, no difference in strength in extremities noted at this time. Patient pleasant, conversational and hungry. 0945:  Neurosurgical associates notified of consult. Dr. Ralph Duke nurse aware.

## 2020-08-07 NOTE — PROGRESS NOTES
Problem: Mobility Impaired (Adult and Pediatric)  Goal: *Acute Goals and Plan of Care (Insert Text)  Description:   FUNCTIONAL STATUS PRIOR TO ADMISSION: Patient was modified independent using a single point cane for functional mobility. Very difficult to obtain detailed PLOF info. HOME SUPPORT PRIOR TO ADMISSION: The patient lived with his spouse (and son?) in a 2 story house - but stays all on 1st floor. Very difficult to obtain detailed info. Physical Therapy Goals  Initiated 8/7/2020  1. Patient will move from supine to sit and sit to supine , scoot up and down, and roll side to side in bed with modified independence within 7 day(s). 2.  Patient will transfer from bed to chair and chair to bed with modified independence using the least restrictive device within 7 day(s). 3.  Patient will perform sit to stand with modified independence within 7 day(s). 4.  Patient will ambulate with modified independence for 150 feet with the least restrictive device within 7 day(s). 5.  Patient will improve Ochoa Balance score by 7 points within 7 days. Outcome: Progressing Towards Goal     PHYSICAL THERAPY EVALUATION- NEURO POPULATION  Patient: Srinath Smith (86 y.o. male)  Date: 8/7/2020  Primary Diagnosis: ICH (intracerebral hemorrhage) ([facility] [address] [I61.9]        Precautions:          ASSESSMENT  Based on the objective data described below, the patient presents with impaired functional mobility as compared to baseline level 2*  L sided numbness and ?increased weakness from baseline level following admission for GLF resulting in traumatic R parietal ICH. Pt is extremely [place] and has limited vision at baseline making detailed questioning/communication very difficult. Per chart, he lives with his spouse (and son?) and was mod I using a SPC and attending OP PT. Does have a hx of previous R occipital hemorrhagic CVA - extent of residual deficits unknown.      Today, he was able to mobilize to EOB with SUP - impulsive but no c/o dizziness with transition. Completed multiple sit<>stands from various surfaces with CGA and cues for hand placement/sequencing - especially to integrate L UE with transitional movement. Gait training initiated in room with SPC and CGA - demos decr L step lengths/clearance and minor instabilities however no major LOB with turn management. Assisted to toilet for urgent needs - demos extreme difficulty with total task sequencing and management of hygiene/gown/brief/sock change. Max cues to visually attended and locate objects to the left (ie: soap, paper towels, etc) - unsure if largely due to inability to hear cues/directions or significant change from baseline. Remained up in chair at end of session, VSS, NAD. Anticipate that he is below his baseline level and a definite ongoing high falls risk - may benefit from short stay acute IPR pending continued progress and verification of PLOF/home set up. Will follow. For the weekend, recommend patient to complete as able in order to maintain strength, endurance and independence with nursing: OOB to chair 3x/day with assist x1 and ambulating with SPC, gait belt, and assist x1. PT to follow-up with patient after the weekend. Current Level of Function Impacting Discharge (mobility/balance): CGA/min A with SPC; L weakness UE>LE; possible L VF cut; L inattention    Functional Outcome Measure: The patient scored Total: 11/56 on the [facility] Assessment which is indicative of high fall risk. Other factors to consider for discharge: high falls risk; L sided weakness; L VF      Patient will benefit from skilled therapy intervention to address the above noted impairments.        PLAN :  Recommendations and Planned Interventions: bed mobility training, transfer training, gait training, therapeutic exercises, neuromuscular re-education, patient and family training/education, and therapeutic activities      Frequency/Duration: Patient will be followed by physical therapy:  5 times a week to address goals. Recommendation for discharge: (in order for the patient to meet his/her long term goals)  Therapy 3 hours per day 5-7 days per week pending progress     This discharge recommendation:  Has been made in collaboration with the attending provider and/or case management    IF patient discharges home will need the following DME: to be determined (TBD) - has SPC         SUBJECTIVE:   Patient stated I can't hear you.     OBJECTIVE DATA SUMMARY:   HISTORY:    Past Medical History:   Diagnosis Date    Depression     Diabetes ([facility] [address]     Falls 4/26/2013    Hearing loss 4/26/2013    Hemorrhagic stroke ([facility] [address] 02/11/2019    Memory loss 4/26/2013    Ringing in ears 4/26/2013    Stroke [facility])      Past Surgical History:   Procedure Laterality Date    HX CHOLECYSTECTOMY      NEUROLOGICAL PROCEDURE UNLISTED         Personal factors and/or comorbidities impacting plan of care: PMHx    Home Situation  Home Environment: Private residence  One/Two Story Residence: Two story, live on 1st floor  Living Alone: No  Support Systems: Spouse/Significant Other/Partner  Current DME Used/Available at Home: Cane, straight    EXAMINATION/PRESENTATION/DECISION MAKING:   Critical Behavior:  Neurologic State: Alert  Orientation Level:  Other (Comment)(extreme [place]; difficulty when written as well)  Cognition: Decreased attention/concentration, Follows commands  Safety/Judgement: Decreased awareness of environment, Decreased awareness of need for assistance, Decreased awareness of need for safety, Decreased insight into deficits  Hearing:     Skin:    Edema:   Range Of Motion:  AROM: Generally decreased, functional(B LEs; decr L UE ROM)  PROM: Generally decreased, functional(B LEs; L UE decr ROM)        Strength:    Strength: Generally decreased, functional        Tone & Sensation:   Sensation: Impaired(L side )        Coordination:  Coordination: Generally decreased, functional(impaired L FTN)  Vision: Functional Mobility:  Bed Mobility:  Supine to Sit: Supervision        Transfers:  Sit to Stand: Contact guard assistance  Stand to Sit: Contact guard assistance        Balance:   Sitting: Intact  Standing: Impaired  Standing - Static: Good  Standing - Dynamic : Good;Fair(with SPC)  Ambulation/Gait Training:  Distance (ft): 50 Feet (ft)  Assistive Device: Gait belt;Cane, straight  Ambulation - Level of Assistance: Assist x1;Contact guard assistance  Gait Description (WDL): Exceptions to WDL  Gait Abnormalities: Altered arm swing;Decreased step clearance;Shuffling gait;Trunk sway increased  Base of Support: Narrowed  Stance: Left decreased  Speed/Sofi: Shuffled; Slow  Step Length: Left shortened;Right shortened  Swing Pattern: Left asymmetrical      Functional Measure  Ochoa Balance Test:    Sitting to Standing: 3  Standing Unsupported: 0  Sitting with Back Unsupported: 3  Standing to Sitting: 3  Transfers: 2  Standing Unsupported with Eyes Closed: 0  Standing Unsupported with Feet Together: 0  Reach Forward with Outstretched Arm: 0   Object: 0  Turn to Look Over Shoulders: 0  Turn 360 Degrees: 0  Alternate Foot on Step/Stool: 0  Standing Unsupported One Foot in Front: 0  Stand on One Le  Total:          56=Maximum possible score;   0-20=High fall risk  21-40=Moderate fall risk   41-56=Low fall risk        Physical Therapy Evaluation Charge Determination   History Examination Presentation Decision-Making   HIGH Complexity :3+ comorbidities / personal factors will impact the outcome/ POC  MEDIUM Complexity : 3 Standardized tests and measures addressing body structure, function, activity limitation and / or participation in recreation  LOW Complexity : Stable, uncomplicated  HIGH Complexity : FOTO score of 1- 25       Based on the above components, the patient evaluation is determined to be of the following complexity level: LOW     Pain Rating:  none    Activity Tolerance:   Good  Please refer to the flowsheet for vital signs taken during this treatment. After treatment patient left in no apparent distress:   Sitting in chair, Call bell within reach, and Bed / chair alarm activated    COMMUNICATION/EDUCATION:   The patients plan of care was discussed with: Registered nurse and Case management. Patient was educated regarding his deficit(s) of L weakness as this relates to his diagnosis of R ICH. He demonstrated Good understanding as evidenced by nodding. Patient and/or family was verbally educated on the BE FAST acronym for signs/symptoms of CVA and TIA. BE FAST was written on patient's communication board  for visual education and reinforcement. All questions answered with patient indicating good understanding. Fall prevention education was provided and the patient/caregiver indicated understanding., Patient/family have participated as able in goal setting and plan of care. , and Patient/family agree to work toward stated goals and plan of care.     Thank you for this referral.  Stefan Victoria, PT, DPT   Time Calculation: 51 mins

## 2020-08-07 NOTE — CONSULTS
Neurology Consult  Maegan Montano NP    Patient: Román Mead MRN: [id number]  SSN: [id number]    YOB: 1940  Age: 67 y.o. Sex: male      Chief Complaint: Left hand numbness, clumbsiness and weakness, fall    Subjective:      Román Mead is a 67 y.o. male with history of right parietal occipital hemorrhagic stroke, DM2, memory loss and depression was transferred to [facility] ICU for a higher level of care after presenting to the [address] [place] [address] ER with symptoms of left hand numbness, clumbsiness and weakness for several days. Patient stated that he fell earlier today but hit the left side of his head. On ER record, his wife stated that since his previous stroke he has left-sided visual field deficits and difficulty with balance and falls frequently. Patient states he has a history of numbness and tingling in his left leg for 20 years due to history of meningitis. He had CT on arrival to [facility] ED and an acute right parietal hemorrhage was noted at the vertex. Dr. Georgiana Sultana in Neurosurgery was consulted by the ER doctor and he recommended admission to [facility] for further evaluation and treatment. Patient was transferred to [facility] ICU in stable condition.      Past Medical History:   Diagnosis Date    Depression     Diabetes ([facility] [address]     Falls 4/26/2013    Hearing loss 4/26/2013    Hemorrhagic stroke ([facility] [address] 02/11/2019    Memory loss 4/26/2013    Ringing in ears 4/26/2013    Stroke ([facility] [address]      Family History   Problem Relation Age of Onset    Dementia Maternal Grandfather     No Known Problems Mother     No Known Problems Father      Social History     Tobacco Use    Smoking status: Current Every Day Smoker    Smokeless tobacco: Never Used   Substance Use Topics    Alcohol use: No      Prior to Admission Medications   Prescriptions Last Dose Informant Patient Reported?  Taking?   donepeziL (ARICEPT) 10 mg tablet   No No   Sig: TAKE ONE TABLET BY MOUTH DAILY   Patient taking differently: Take 5 mg by mouth nightly. insulin glargine (LANTUS) 100 unit/mL injection   Yes No   Si Units by SubCUTAneous route nightly. insulin lispro (HUMALOG) 100 unit/mL injection   Yes No   Si Units by SubCUTAneous route as needed. lisinopril (PRINIVIL, ZESTRIL) 20 mg tablet   Yes No   Sig: Take  by mouth daily. memantine (Namenda) 5 mg tablet   No No   Sig: Take 1 Tab by mouth two (2) times a day. Facility-Administered Medications: None       Allergies   Allergen Reactions    Bactrim [Sulfamethoprim] Unknown (comments)    Dilantin [Phenytoin Sodium Extended] Unknown (comments)    Doxycycline Unknown (comments)    Lipitor [Atorvastatin] Myalgia    Pravastatin Myalgia    Sertraline Unknown (comments)    Sulfur Diarrhea    Tegretol [Carbamazepine] Unknown (comments)    Zocor [Simvastatin] Myalgia       Review of Systems:    Denies chest pain, leg pain, nausea, vomiting, difficulty swallowing, headache, visual disturbance, and dyspnea. Objective:     Vitals:    20 0015 20 0031 20 0045 20 0100   BP: 123/43 137/49 126/51 121/44   Pulse: (!) 58 74 (!) 57 66   Resp: 19 15 14 14   Temp:       SpO2: 94% 95% 92% 96%   Weight:       Height:          Physical Exam:  GENERAL: Calm, cooperative, NAD  SKIN: Warm, dry, color appropriate for ethnicity. Neurologic Exam:  Mental Status:  Alert and oriented x 3. Appropriate affect, mood and behavior. Language:    Normal fluency, repetition, comprehension and naming. Cranial Nerves:   Pupils 3 mm, equal, round and reactive to light. Visual fields full to confrontation. Extraocular movements intact. Facial sensation intact. Full facial strength, no asymmetry. Hard of hearing. No dysarthria. Tongue protrudes to midline, palate elevates symmetrically. Shoulder shrug 5/5 bilaterally. Motor:    No pronator drift. Bulk and tone normal.      5/5 power in all extremities proximally and distally.  Mildly weaker  in the    left hand. No involuntary movements. Sensation:    Sensation intact throughout to light touch. Reflexes:     Negative Babinskis    Coordination & Gait: Gait not assessed. FTN and HTS with ataxia noted in the left upper and left lower extremities. NIHSS:      1a-LOC:0    1b-Month/Age:1 Can state  but not age    1c-Open/Close Hand:0    2-Best Gaze:0    3-Visual Fields:0    4-Facial Palsy:0    5a-Left Arm:0, no drift for me but slightly weaker  strength and has had drift on previous exams    5b-Right Arm:0    6a-Left Le    6b-Right Le    7-Limb Ataxia:2, left arm and left leg    8-Sensory:1    9-Best Language:0    10-Dysarthria:0    11-Extinction/Inattention:0  TOTAL SCORE: 4      Labs:  Lab Results   Component Value Date/Time    WBC 9.3 2020 05:54 PM    HGB 13.3 2020 05:54 PM    HCT 40.5 2020 05:54 PM    PLATELET 954  05:54 PM    MCV 97.4 2020 05:54 PM      Lab Results   Component Value Date/Time    Sodium 140 2020 05:54 PM    Potassium 4.5 2020 05:54 PM    Chloride 108 2020 05:54 PM    CO2 29 2020 05:54 PM    Anion gap 3 (L) 2020 05:54 PM    Glucose 83 2020 05:54 PM    BUN 30 (H) 2020 05:54 PM    Creatinine 1.55 (H) 2020 05:54 PM    BUN/Creatinine ratio 19 2020 05:54 PM    GFR est AA 53 (L) 2020 05:54 PM    GFR est non-AA 44 (L) 2020 05:54 PM    Calcium 8.6 2020 05:54 PM     No results found for: CPK, RCK1, RCK2, RCK3, RCK4, CKMB, CKNDX, CKND1, TROPT, TROIQ, BNPP, BNP     CT Results (maximum last 3): Results from [place] encounter on 20   CT HEAD WO CONT    Narrative EXAM: CT HEAD WO CONT    INDICATION: left arm weakness and numbness    COMPARISON: 3/15/2019. CONTRAST: None. TECHNIQUE: Unenhanced CT of the head was performed using 5 mm images. Brain and  bone windows were generated. Coronal and sagittal reformats.  CT dose reduction  was achieved through use of a standardized protocol tailored for this  examination and automatic exposure control for dose modulation. FINDINGS:  Enlarged compatible with the overall degree of volume loss. . Encephalomalacia  right hemisphere with low density in the periventricular white matter again  noted. Acute right parietal hemorrhage at the vertex measuring 2.4 x 3.6 x 3.4  cm. Estimated volume 14.7 cc The hemorrhage has a fluid fluid level in it. . No  mass effect or midline shift. . . The bone windows demonstrate post right occipital craniectomy. . The visualized  portions of the paranasal sinuses and mastoid air cells are clear. Impression IMPRESSION:   Acute right parietal hemorrhage at the vertex. The findings were called to Dr. Anne Mendoza on 8/6/2020 at [address] [place] [address] by myself. [phone]       Results from [place] encounter on 03/15/19   CT HEAD WO CONT    Narrative EXAM:  CT HEAD WO CONT  INDICATION:  Stroke; occipital ICH, nontraumatic cortical hemorrhage of right  cerebral hemisphere, I 61.1,  TECHNIQUE:  Thin axial images were obtained through the calvarium and saved with standard  and bone window algorithm. Coronal and sagittal reconstructions were generated. CT dose reduction was achieved through use of a standardized protocol tailored  for this examination and automatic exposure control for dose modulation. COMPARISON: None available. FINDINGS:  The patient's recent neurology office note describes history of right posterior  cerebral hematoma demonstrated on imaging while at [facility]  last month. There is an ill-defined area of relative decreased attenuation in the right  posterior parietal/occipital lobe measuring approximately 2.3 x 3.8 x 2.9 cm. Central heterogeneity may represent residual blood products. No high density demonstrated with in the brain to suggest acute hemorrhage. No abnormal extra-axial fluid collections.   Multifocal and confluent decreased attenuation in the cerebral white matter  suggesting chronic small vessel ischemic changes possibly progressed when  compared to an MRI 6/15/15. Again demonstrated is generalized prominence of the ventricles without evidence  of obstructive hydrocephalus and somewhat out of proportion to sulcal size also  similar to the MRI 6/15/15. Prior bilateral mastoidectomies. Structures of the skull base including  paranasal sinuses are otherwise unremarkable. Impression IMPRESSION:  1. Approximately 3 cm area of abnormal density right posterior parietal  occipital lobe is apparently related to a resolving parenchymal hematoma. 2. No acute intracranial hemorrhage. 3. Chronic generalized volume loss and white matter disease similar to prior  exams. Assessment:     Hospital Problems  Date Reviewed: 10/15/2019          Codes Class Noted POA    [facility] ICD-10-CM: I61.9  ICD-9-CM: 242  8/6/2020 Unknown            Plan:     1) Intracerebral Hemorrhage, ICH score: 0   - CT head showed acute parietal hemorrhage at the vertex with a volume of 14.7  cc.    -Repeat CT head in the am showed subacute right parietal hemorrhage that is slightly diminished in size compared to the prior exam. There is severe chronic microvascular ischemic change and moderate to severe temporal predominant cerebral atrophy. Atherosclerotic cerebral vasculopathy noted. - SBP goal less than 140, Cardene/Labetalol PRN   - Keppra 250 BID for seizure ppx. Patient with decreased creatinine clearance. - q1 neuro checks    - A1C and lipid panel pending, LDL goal <70              - MRI/MRA brain ordered              - ECHO ordered              - PT/OT/SLP evals              - Stroke education   - NSGY following         I have discussed the diagnosis and the intended plan as seen in the above orders with Dr. Hailey Macdonald, [address] [place] [address] team, the patient and the primary RN.  Patient updated on current plan of care and is in agreement. All questions were answered. Will await further recommendations from Dr. Nicolas Del Cid. Thank you for this consult and participating in the care of this patient.   Signed By: Diaz Pandey NP     August 7, 2020

## 2020-08-07 NOTE — PROGRESS NOTES
Occupational Therapy  08/07/20    Chart reviewed. Attemtped to see pt for OT. Pt is currently off the floor for MRI testing. Wife at bedisde and able to provide PLOF. Her  has hx of dementia, requires A with ADLs due to poor seqeuncing and memory (ie pt dons shoes on wrong feet or puts shirts on backwards). He uses a [facility] but often forgets where he placed it or holds it above the ground. Pt with multiple falls, requiring assist of wife and pt's son to get up. Last fall required an EMS call. Pt has attended OP PT at [address] [place] [address] for last month and a half. He is L hand dominant and uses large serving spoons for eating. Wife is concerned about taking pt home and questions her ability to care for him safely at home. Wife was inquring about aides in the home. Will follow for OT.  Hedy Garcia MS, OTR/L    Total time 10  min

## 2020-08-07 NOTE — PROGRESS NOTES
SPEECH LANGUAGE PATHOLOGY BEDSIDE SWALLOW EVALUATION/DISCHARGE  Patient: Melanie Mejía (47 y.o. male)  Date: 8/7/2020  Primary Diagnosis: ICH (intracerebral hemorrhage) ([facility] [I61.9]       Precautions: n/a       ASSESSMENT :  Patient presents without difficulty nor overt s/s of aspiration on this date. Therefore, recommend diet outlined below. Suspect pt at baseline swallow function. Pt with baseline dementia and therefore not indicated for integrated language. No further SLP needs. Patient will benefit from skilled intervention to address the above impairments. Patients rehabilitation potential is considered to be Good     PLAN :  Recommendations and Planned Interventions:  --Regular diet/thin liquids  --Upright during meals  --General aspiration precautions  Frequency/Duration: Patient will be discharged. Discharge Recommendations: To Be Determined     SUBJECTIVE:   Patient was alert and participative. OBJECTIVE:     Past Medical History:   Diagnosis Date    Depression     Diabetes ([facility]     Falls 4/26/2013    Hearing loss 4/26/2013    Hemorrhagic stroke ([facility] 02/11/2019    Memory loss 4/26/2013    Ringing in ears 4/26/2013    Stroke [facility])      Past Surgical History:   Procedure Laterality Date    HX CHOLECYSTECTOMY      NEUROLOGICAL PROCEDURE UNLISTED       Prior Level of Function/Home Situation:      Diet prior to admission: Regular diet/thin liquids  Current Diet:  Regular diet/thin liquids   Cognitive and Communication Status:  Neurologic State: Alert  Orientation Level: Oriented X4  Cognition: Follows commands  Perception: Appears intact  Perseveration: No perseveration noted  Safety/Judgement: Not assessed  Oral Assessment:  Oral Assessment  Labial: No impairment  Dentition: Intact  Oral Hygiene: oral mucosa moist and clear of secretions   Lingual: No impairment  Velum: No impairment  Mandible: No impairment  P.O.  Trials:  Patient Position: upright in bed  Vocal quality prior to P.O.: No impairment  Consistency Presented: Thin liquid; Solid  How Presented: Self-fed/presented;Cup/sip     Bolus Acceptance: No impairment  Bolus Formation/Control: No impairment     Propulsion: No impairment  Oral Residue: None  Initiation of Swallow: No impairment  Laryngeal Elevation: Functional  Aspiration Signs/Symptoms: None  Pharyngeal Phase Characteristics: No impairment, issues, or problems              Oral Phase Severity: No impairment  Pharyngeal Phase Severity : No impairment    NOMS:   The NOMS functional outcome measure was used to quantify this patient's level of swallowing impairment. Based on the NOMS, the patient was determined to be at level 7 for swallow function       NOMS Swallowing Levels:  Level 1 (CN): NPO  Level 2 (CM): NPO but takes consistency in therapy  Level 3 (CL): Takes less than 50% of nutrition p.o. and continues with nonoral feedings; and/or safe with mod cues; and/or max diet restriction  Level 4 (CK): Safe swallow but needs mod cues; and/or mod diet restriction; and/or still requires some nonoral feeding/supplements  Level 5 (CJ): Safe swallow with min diet restriction; and/or needs min cues  Level 6 (CI): Independent with p.o.; rare cues; usually self cues; may need to avoid some foods or needs extra time  Level 7 ([address] [place] [address]): Independent for all p.o.  BETTY. (2003). National Outcomes Measurement System (NOMS): Adult Speech-Language Pathology User's Guide. Pain:  Pain Scale 1: Numeric (0 - 10)  Pain Intensity 1: 0       After treatment:   Patient left in no apparent distress in bed, Call bell within reach and Nursing notified    COMMUNICATION/EDUCATION:     The patient's plan of care including recommendations, planned interventions, and recommended diet changes were discussed with: Registered nurse. Thank you for this referral.  Catherine Messer  Time Calculation: 10 mins          Regarding student involvement in patient care:  A student participated in this treatment session.  Per CMS Medicare statements and BETTY guidelines I certify that the following was true:  1. I was present and directly observed the entire session. 2. I made all skilled judgments and clinical decisions regarding care. 3. I am the practitioner responsible for assessment, treatment, and documentation.

## 2020-08-07 NOTE — PROGRESS NOTES
1700: Bedside and Verbal shift change report given to Radha Tavarez RN (oncoming nurse) by Maria Del Rosario Carter RN (offgoing nurse). Report included the following information SBAR, Kardex, Intake/Output, MAR, Recent Results, Med Rec Status, Cardiac Rhythm Sinus Daiana Nivia, Alarm Parameters  and Dual Neuro Assessment. 1700: Assumed care of PT AOX4, moves all extremities, follows commands, very [place], PERRLA;     1730: per MARGARET Morales-spoke to Giovani Steiner regarding IR order, Julia Hale will do consult [facility]    7108: MRI w/ contrast ordered; spoke to Fanny Hastings in MRI probably wont be able to complete until tomorrow, will call when patient can come down    1940: Bedside and Verbal shift change report given to Melo Faye RN (oncoming nurse) by Radha Tavarez RN (offgoing nurse). Report included the following information SBAR, Kardex, Intake/Output, MAR, Cardiac Rhythm Sinus Daiana Nivia, Alarm Parameters  and Dual Neuro Assessment.

## 2020-08-07 NOTE — ED NOTES
TRANSFER - OUT REPORT:    Verbal report given to Nafisa Mukherjee on Maria Del Rosario Blunt  being transferred to [facility] ICU 08 for routine progression of care       Report consisted of patients Situation, Background, Assessment and   Recommendations(SBAR). Information from the following report(s) SBAR, ED Summary, [facility] and Recent Results was reviewed with the receiving nurse. Opportunity for questions and clarification was provided.

## 2020-08-07 NOTE — H&P
History and Physical    Subjective:     Jorge Lerma is a 67 y.o. with a past medical history of dementia, diabetes, previous R parietal hemorrhagic stroke who presented to [facility] ED for L hand numbness that started on Tuesday. CT head revealed acute R parietal hemorrhage. Patient transferred to [facility] for neurosurgical evaluation. On arrival, patient states that he has been having numbness in his left hand. He states that he did fall today and hit his head on the door. He denies losing consciousness and said \"it wasn't a bad fall. \" Per patient, he falls frequently and uses a cane. On exam, patient hard of hearing, alert and oriented x4. Patient with ataxia of left upper extremity and endorses numbness of left hand. Left upper extremity mildly weaker. 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 Past Medical History:   Diagnosis Date    Depression     Diabetes ([facility]     Falls 4/26/2013    Hearing loss 4/26/2013    Hemorrhagic stroke ([facility] 02/11/2019    Memory loss 4/26/2013    Ringing in ears 4/26/2013    Stroke [facility])       Past Surgical History:   Procedure Laterality Date    HX CHOLECYSTECTOMY      NEUROLOGICAL PROCEDURE UNLISTED       Family History   Problem Relation Age of Onset    Dementia Maternal Grandfather     No Known Problems Mother     No Known Problems Father       Social History     Tobacco Use    Smoking status: Current Every Day Smoker    Smokeless tobacco: Never Used   Substance Use Topics    Alcohol use: No       Prior to Admission medications    Medication Sig Start Date End Date Taking? Authorizing Provider   memantine (Namenda) 5 mg tablet Take 1 Tab by mouth two (2) times a day.  6/11/20   Gunjan Hurd MD   donepeziL (ARICEPT) 10 mg tablet TAKE ONE TABLET BY MOUTH DAILY  Patient taking differently: Take 5 mg by mouth nightly. 4/22/20   Rolando Hurd MD   lisinopril (PRINIVIL, ZESTRIL) 20 mg tablet Take  by mouth daily. Provider, Historical   insulin lispro (HUMALOG) 100 unit/mL injection 4 Units by SubCUTAneous route as needed. Provider, Historical   insulin glargine (LANTUS) 100 unit/mL injection 11 Units by SubCUTAneous route nightly. Provider, Historical     Allergies   Allergen Reactions    Bactrim [Sulfamethoprim] Unknown (comments)    Dilantin [Phenytoin Sodium Extended] Unknown (comments)    Doxycycline Unknown (comments)    Lipitor [Atorvastatin] Myalgia    Pravastatin Myalgia    Sertraline Unknown (comments)    Sulfur Diarrhea    Tegretol [Carbamazepine] Unknown (comments)    Zocor [Simvastatin] Myalgia        Review of Systems:  Pertinent items are noted in the History of Present Illness. Objective: Intake and Output:    No intake/output data recorded. No intake/output data recorded. Physical Exam:   Visit Vitals  BP (!) 104/31   Pulse (!) 45   Temp 98.5 °F (36.9 °C)   Resp 12   Ht 5' 10\" (1.778 m)   SpO2 (!) 89%   BMI 27.98 kg/m²     General:  Alert, cooperative, no distress, appears stated age. Head:  Normocephalic, without obvious abnormality, atraumatic. Eyes:  Conjunctivae/corneas clear. PERRL, EOMs intact. Nose: Nares normal. Septum midline. Mucosa normal. No drainage or sinus tenderness. Throat: Lips, mucosa, and tongue normal. Teeth and gums normal.   Neck: Supple, symmetrical, trachea midline, no adenopathy   Lungs:   Clear to auscultation bilaterally. Heart:  Regular rate and rhythm, S1, S2 normal, no murmur, click, rub or gallop. Abdomen:   Soft, non-tender. Bowel sounds normal. No masses,  No organomegaly. Extremities: Extremities normal, atraumatic, no cyanosis or edema. Pulses: 2+ and symmetric all extremities.    Skin: Skin color, texture, turgor normal. Scattered ecchymosis on arms   Neurologic: Alert and oriented, [place], able to follow commands in all extremities, LUE slightly weak with ataxia, L hand numbness       ECG:  normal EKG, normal sinus rhythm     Data Review:   Recent Results (from the past 24 hour(s))   CBC WITH AUTOMATED DIFF    Collection Time: 08/06/20  5:54 PM   Result Value Ref Range    WBC 9.3 4.1 - 11.1 K/uL    RBC 4.16 4.10 - 5.70 M/uL    HGB 13.3 12.1 - 17.0 g/dL    HCT 40.5 36.6 - 50.3 %    MCV 97.4 80.0 - 99.0 FL    MCH 32.0 26.0 - 34.0 PG    MCHC 32.8 30.0 - 36.5 g/dL    RDW 12.7 11.5 - 14.5 %    PLATELET 883 327 - 930 K/uL    MPV 9.3 8.9 - 12.9 FL    NRBC 0.0 0  WBC    ABSOLUTE NRBC 0.00 0.00 - 0.01 K/uL    NEUTROPHILS 66 32 - 75 %    LYMPHOCYTES 22 12 - 49 %    MONOCYTES 9 5 - 13 %    EOSINOPHILS 3 0 - 7 %    BASOPHILS 0 0 - 1 %    IMMATURE GRANULOCYTES 0 0.0 - 0.5 %    ABS. NEUTROPHILS 6.0 1.8 - 8.0 K/UL    ABS. LYMPHOCYTES 2.1 0.8 - 3.5 K/UL    ABS. MONOCYTES 0.8 0.0 - 1.0 K/UL    ABS. EOSINOPHILS 0.3 0.0 - 0.4 K/UL    ABS. BASOPHILS 0.0 0.0 - 0.1 K/UL    ABS. IMM. GRANS. 0.0 0.00 - 0.04 K/UL    DF AUTOMATED     METABOLIC PANEL, BASIC    Collection Time: 08/06/20  5:54 PM   Result Value Ref Range    Sodium 140 136 - 145 mmol/L    Potassium 4.5 3.5 - 5.1 mmol/L    Chloride 108 97 - 108 mmol/L    CO2 29 21 - 32 mmol/L    Anion gap 3 (L) 5 - 15 mmol/L    Glucose 83 65 - 100 mg/dL    BUN 30 (H) 6 - 20 MG/DL    Creatinine 1.55 (H) 0.70 - 1.30 MG/DL    BUN/Creatinine ratio 19 12 - 20      GFR est AA 53 (L) >60 ml/min/1.73m2    GFR est non-AA 44 (L) >60 ml/min/1.73m2    Calcium 8.6 8.5 - 10.1 MG/DL   SAMPLES BEING HELD    Collection Time: 08/06/20  5:54 PM   Result Value Ref Range    SAMPLES BEING HELD SST. RED     COMMENT        Add-on orders for these samples will be processed based on acceptable specimen integrity and analyte stability, which may vary by analyte.    PROTHROMBIN TIME + INR    Collection Time: 08/06/20  8:32 PM   Result Value Ref Range    INR 1.0 0.9 - 1.1 Prothrombin time 10.7 9.0 - 11.1 sec       Assessment/Plan:   1. Acute R parietal hemorrhage  1. Possibly due to trauma vs ischemic stroke with hemorrhagic conversion  2. Patient with previous R parietal [address]  3. Neurosurgery and neurology consulted  4. Plan for repeat head CT in am, unless mental status changes  5. SBP < 140, cardene as needed  6. q1h neurochecks  7. Keppra 250mg BID for seizure ppx  2. Hypertension  1. Patient take lisinopril at home- will restart in AM  3. Dementia  1. Continue memantine and donepezil  4. Diabetes  1.  Started SSI    CCT 45 mins    JAXON Scott

## 2020-08-07 NOTE — PROGRESS NOTES
HEBER  Patient presented to [facility] from home with numbness, tingling left arm. Per wife patient is more confused and has fallen recently. Head CT: acute parietal Hemorrhage. Transferred to [facility] for Neurosurgery eval.   RUR 12%  Disposition: TBD pending medical progress and therapy recommendations  Plan for utilizing home health:   TBD       PCP: First and Last name:  Dr Marita Kendrick   Name of Practice:    Are you a current patient: Yes/No: Yes   Approximate date of last visit: unknown   Can you participate in a virtual visit with your PCP: Yes                    Current Advanced Directive/Advance Care Plan: Not on file                         Care manager met with patient to introduce self and explain role. Patient lives independently with his wife Noy Short: [phone], H: [phone]. Per patient he has uses a walker and a cane to assist with ambulation and goes to [facility] for outpatient therapy twice a week. Patient's wife drives him. Confirmed his PCP to be Dr Marita Kendrick and he sees him every 6 months. Confirmed his insurance to be Medicare A,B with a [employer]. Care management will follow for transitions of care.    Marina To RN,CRM  Care Management Interventions  PCP Verified by CM: Yes(Dr Marita Kendrick )  MyChart Signup: No  Discharge Durable Medical Equipment: No  Physical Therapy Consult: Yes  Occupational Therapy Consult: Yes  Speech Therapy Consult: Yes  Current Support Network: Lives with Spouse(wife Stephanie Ornelas C: [phone])

## 2020-08-07 NOTE — PROGRESS NOTES
I was called by transfer center yesterday saying pt was to be transferred but never notified he had arrived.  Reviewed the CT, no mass effect from new parietal hemorrhage  Would not recommend surgery at this time  Will follow as needed  Full note to follow

## 2020-08-07 NOTE — PROGRESS NOTES
[facility] visit. Mrs. Izetta Goltz was with her  who is a Letta Agee of [address] They are converts and very active in their parish and community. 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 Prayer and communion offered. The couple celebrated their 60th wedding anniversary in July and Mr. Izetta Goltz said the best advise is never go to bed angry until the situation is resolved.     KYLIE Watt, RN, ACSW, LCSW   Page:  [phone]

## 2020-08-07 NOTE — PROGRESS NOTES
0920: Bedside, Verbal and Written shift change report given to TAM Aldrich (oncoming nurse) by  Inés Goins RN (offgoing nurse). Report included the following information SBAR, Kardex, Intake/Output, MAR, Recent Results, Cardiac Rhythm Clint Otero and Dual Neuro Assessment.

## 2020-08-07 NOTE — ED NOTES
Pt leaves stable with [address] transport team to [facility]

## 2020-08-08 ENCOUNTER — APPOINTMENT (OUTPATIENT)
Dept: GENERAL RADIOLOGY | Age: 80
DRG: 064 | End: 2020-08-08
Payer: MEDICARE

## 2020-08-08 PROBLEM — I67.1 ANTERIOR COMMUNICATING ARTERY ANEURYSM: Status: ACTIVE | Noted: 2020-08-08

## 2020-08-08 LAB
ANION GAP SERPL CALC-SCNC: 6 MMOL/L (ref 5–15)
BASOPHILS # BLD: 0 K/UL (ref 0–0.1)
BASOPHILS NFR BLD: 0 % (ref 0–1)
BUN SERPL-MCNC: 28 MG/DL (ref 6–20)
BUN/CREAT SERPL: 22 (ref 12–20)
CALCIUM SERPL-MCNC: 8.6 MG/DL (ref 8.5–10.1)
CHLORIDE SERPL-SCNC: 110 MMOL/L (ref 97–108)
CO2 SERPL-SCNC: 24 MMOL/L (ref 21–32)
CREAT SERPL-MCNC: 1.26 MG/DL (ref 0.7–1.3)
DIFFERENTIAL METHOD BLD: ABNORMAL
EOSINOPHIL # BLD: 0.3 K/UL (ref 0–0.4)
EOSINOPHIL NFR BLD: 4 % (ref 0–7)
ERYTHROCYTE [DISTWIDTH] IN BLOOD BY AUTOMATED COUNT: 12.2 % (ref 11.5–14.5)
GLUCOSE BLD STRIP.AUTO-MCNC: 100 MG/DL (ref 65–100)
GLUCOSE BLD STRIP.AUTO-MCNC: 129 MG/DL (ref 65–100)
GLUCOSE BLD STRIP.AUTO-MCNC: 93 MG/DL (ref 65–100)
GLUCOSE BLD STRIP.AUTO-MCNC: 95 MG/DL (ref 65–100)
GLUCOSE SERPL-MCNC: 100 MG/DL (ref 65–100)
HCT VFR BLD AUTO: 39.1 % (ref 36.6–50.3)
HGB BLD-MCNC: 12.6 G/DL (ref 12.1–17)
IMM GRANULOCYTES # BLD AUTO: 0 K/UL (ref 0–0.04)
IMM GRANULOCYTES NFR BLD AUTO: 0 % (ref 0–0.5)
INR PPP: 1.1 (ref 0.9–1.1)
LYMPHOCYTES # BLD: 1.8 K/UL (ref 0.8–3.5)
LYMPHOCYTES NFR BLD: 26 % (ref 12–49)
MAGNESIUM SERPL-MCNC: 2.3 MG/DL (ref 1.6–2.4)
MCH RBC QN AUTO: 31.6 PG (ref 26–34)
MCHC RBC AUTO-ENTMCNC: 32.2 G/DL (ref 30–36.5)
MCV RBC AUTO: 98 FL (ref 80–99)
MONOCYTES # BLD: 0.7 K/UL (ref 0–1)
MONOCYTES NFR BLD: 9 % (ref 5–13)
NEUTS SEG # BLD: 4.3 K/UL (ref 1.8–8)
NEUTS SEG NFR BLD: 61 % (ref 32–75)
NRBC # BLD: 0 K/UL (ref 0–0.01)
NRBC BLD-RTO: 0 PER 100 WBC
PHOSPHATE SERPL-MCNC: 3.4 MG/DL (ref 2.6–4.7)
PLATELET # BLD AUTO: 172 K/UL (ref 150–400)
PMV BLD AUTO: 9.2 FL (ref 8.9–12.9)
POTASSIUM SERPL-SCNC: 4.1 MMOL/L (ref 3.5–5.1)
PROTHROMBIN TIME: 10.9 SEC (ref 9–11.1)
RBC # BLD AUTO: 3.99 M/UL (ref 4.1–5.7)
SERVICE CMNT-IMP: ABNORMAL
SERVICE CMNT-IMP: NORMAL
SODIUM SERPL-SCNC: 140 MMOL/L (ref 136–145)
WBC # BLD AUTO: 7.2 K/UL (ref 4.1–11.1)

## 2020-08-08 PROCEDURE — 74011250636 HC RX REV CODE- 250/636: Performed by: NURSE PRACTITIONER

## 2020-08-08 PROCEDURE — 84100 ASSAY OF PHOSPHORUS: CPT

## 2020-08-08 PROCEDURE — 99221 1ST HOSP IP/OBS SF/LOW 40: CPT | Performed by: RADIOLOGY

## 2020-08-08 PROCEDURE — 85025 COMPLETE CBC W/AUTO DIFF WBC: CPT

## 2020-08-08 PROCEDURE — 65610000006 HC RM INTENSIVE CARE

## 2020-08-08 PROCEDURE — 85610 PROTHROMBIN TIME: CPT

## 2020-08-08 PROCEDURE — 74011000258 HC RX REV CODE- 258: Performed by: NURSE PRACTITIONER

## 2020-08-08 PROCEDURE — 80048 BASIC METABOLIC PNL TOTAL CA: CPT

## 2020-08-08 PROCEDURE — 71045 X-RAY EXAM CHEST 1 VIEW: CPT

## 2020-08-08 PROCEDURE — 36415 COLL VENOUS BLD VENIPUNCTURE: CPT

## 2020-08-08 PROCEDURE — 82962 GLUCOSE BLOOD TEST: CPT

## 2020-08-08 PROCEDURE — 99233 SBSQ HOSP IP/OBS HIGH 50: CPT | Performed by: PSYCHIATRY & NEUROLOGY

## 2020-08-08 PROCEDURE — 74011000250 HC RX REV CODE- 250: Performed by: NURSE PRACTITIONER

## 2020-08-08 PROCEDURE — 74011250637 HC RX REV CODE- 250/637: Performed by: NURSE PRACTITIONER

## 2020-08-08 PROCEDURE — 83735 ASSAY OF MAGNESIUM: CPT

## 2020-08-08 RX ADMIN — Medication 10 ML: at 22:00

## 2020-08-08 RX ADMIN — LEVETIRACETAM 250 MG: 100 INJECTION, SOLUTION INTRAVENOUS at 00:13

## 2020-08-08 RX ADMIN — HYDROCORTISONE: 1 OINTMENT TOPICAL at 17:32

## 2020-08-08 RX ADMIN — MEMANTINE 5 MG: 5 TABLET ORAL at 17:31

## 2020-08-08 RX ADMIN — HYDRALAZINE HYDROCHLORIDE 10 MG: 20 INJECTION INTRAMUSCULAR; INTRAVENOUS at 11:12

## 2020-08-08 RX ADMIN — FAMOTIDINE 20 MG: 10 INJECTION, SOLUTION INTRAVENOUS at 00:13

## 2020-08-08 RX ADMIN — Medication 10 ML: at 05:50

## 2020-08-08 RX ADMIN — HYDROCORTISONE: 1 OINTMENT TOPICAL at 11:20

## 2020-08-08 RX ADMIN — DONEPEZIL HYDROCHLORIDE 10 MG: 10 TABLET, FILM COATED ORAL at 22:00

## 2020-08-08 RX ADMIN — MEMANTINE 5 MG: 5 TABLET ORAL at 08:47

## 2020-08-08 RX ADMIN — Medication 10 ML: at 16:31

## 2020-08-08 RX ADMIN — HYDRALAZINE HYDROCHLORIDE 10 MG: 20 INJECTION INTRAMUSCULAR; INTRAVENOUS at 04:20

## 2020-08-08 RX ADMIN — LEVETIRACETAM 250 MG: 100 INJECTION, SOLUTION INTRAVENOUS at 11:24

## 2020-08-08 NOTE — CONSULTS
[address]    Name:  Darryn Ayers  MR#:  [id number]  :  1940  ACCOUNT #:  [de-identified]  DATE OF SERVICE:  2020    CHIEF COMPLAINT:  A 50-year-old man with new-onset right parietal hemorrhage. HISTORY OF PRESENT ILLNESS:  The patient has a history of dementia, diabetes, previous right parietal hemorrhagic stroke, presented to [facility] for signs and symptoms of numbness, starting a couple of days ago. I was actually called and told the patient was going to be transferred, although I never received a call when he actually was admitted to [facility] after he was transferred. Nonetheless, we did receive a call earlier today. The patient states that he did have a fall, but it was not that bad in his own words. He does not complain of any headache at this time, dizziness, nausea, or vomiting. He has a history of depression, memory loss, diabetes, cholecystectomy. It appears that he had possibly a craniotomy in the past, possibly for the same thing, although the medical record does not provide a lot of details, and the patient cannot do that as well. He is an every day smoker. No alcohol use. MEDICATIONS:  Namenda, Aricept, Prinivil, Humalog, Lantus. ALLERGIES:  BACTRIM, DOXYCYCLINE, DILANTIN, LIPITOR, PRAVASTATIN, SERTRALINE, SULFA, TEGRETOL, ZOCOR. REVIEW OF SYSTEMS:  No other GI, , respiratory, cardiac, neurologic, psychiatric, dermatologic complaints. PHYSICAL EXAMINATION:  CURRENT VITAL SIGNS:  Most recent blood pressure 140/73, O2 sat 94, pulse rate 43, respiratory rate 13. NEUROLOGIC:  He is awake and alert. Moves all four extremities. Pupils equal and reactive. Face is symmetric. Speech is fluent, although he seems a little confused. This apparently is his baseline. Toes downgoing. No clonus at the ankles. Gait and station deferred. Cerebellar coordination unable to obtain.     IMAGING:  CT scan shows a small right parietal hemorrhage. There may be a conversion from a hemorrhagic stroke. IMPRESSION:  There is no neurosurgical intervention necessary. He has a lot of cerebral atrophy. There is no real mass effect from this lesion. I agree with the use of Keppra for seizure prophylaxis. We will be happy to follow as needed, but otherwise we will defer to the primary medical team and reevaluate on request.    Thank you for asking me to see the patient.       Shazia Lozano MD      [id number]  D:  08/07/2020 18:04  T:  08/07/2020 21:05  JOB #:  [id number]  CC:  Linh Teixeira MD

## 2020-08-08 NOTE — PROGRESS NOTES
[facility]    ICU TEAM Progress Note    Name: Belle Ornelas   : 1940   MRN: [id number]   Date: 2020      Assessment and ICU Plan of Care:     ICU Problems:  · Acute R parietal hemorrhage, MRI with stable right parietal intraparenchymal hemorrhage with surrounding edema but no mass-effect or midline shift. Also noted acute infarcts in left inferior parietal lobe as well as chronic hemorrhages with encephalomalacia of the bilateral parieto-occipital lobes right greater than left and superficial siderosis from remote hemorrhage within the right parieto-occipital and temporal lobes and small chronic infarctions bilateral cerebellum. · Neurology and NIS following  · MRI brain with contrast pending. Defer further repeat imaging to neurology unless mental status changes  · SBP < 140, may use Cardene as needed. Currently off. · q1h neurochecks  · Keppra 250mg BID for seizure ppx  · PT/OT/SLP to eval and treat  · Will also check chest x-ray and procalcitonin this afternoon with patient with increased cough and concern for possible aspiration given recent CVA. Consider cultures and empiric antibiotics based on results. · Okay to transfer out of ICU from critical care standpoint. Defer to neurology/NIS  · Cerebral aneurysm, right A1A2 junction, unruptured  · NIS following as above  · Will need outpatient follow-up  · Right parotid mass  · ENT consulted for further eval  · Consider ultrasound, defer to ENT  · Hypertension  · Continue home antihypertensives. PRN hydralazine, labetalol, and Cardene if needed.   Cardene currently off  · Dementia  · Continue memantine and donepezil  · Diabetes  · Accu-Cheks and sliding scale insulin      Cardiac Gtts: None  SBP Goal of: < 140 mmHg  MAP Goal of: > 65 mmHg  Transfusion Trigger (Hgb): <7 g/dL    Respiratory Goals: Head of bed > 30 degrees  SPO2 Goal: > 92%  Pulmonary toilet: NA  DVT Prophylaxis (if no, list reason): SCD's or Sequential Compression Device     GI Prophylaxis: Pepcid (famotidine)   Nutrition: Yes   IVFs: NA  Bowel Movement: Yes  Bowel Regimen: Docusate (Colace)    Ureña Catheter Present: No  Glycemic Control - Insulin: Yes  Antibiotics:None    Pain Medications: Acetaminophen  Target RASS: 0 - Alert & Calm - Spontaneously pays attention to caregiver  Sedation Medications: None  Mobility: Up with assistance  PT/OT: PT consulted and on board and OT consulted and on board   Restraints: None needed at this time  Discussed Plan of Care/Code Status: Full Code    T/L/D  Tubes: None  Lines: Peripheral IV  Drains: None    Subjective:   Progress Note: 8/8/2020      Reason for ICU Admission: Lyn Walter is a 67 y.o. with a past medical history of dementia, diabetes, previous R parietal hemorrhagic stroke who presented to [facility] ED for L hand numbness that started on Tuesday. CT head revealed acute R parietal hemorrhage. Patient transferred to [facility] for neurosurgical evaluation.      On arrival, patient states that he has been having numbness in his left hand. He states that he did fall today and hit his head on the door. He denies losing consciousness and said \"it wasn't a bad fall. \" Per patient, he falls frequently and uses a cane. On exam, patient hard of hearing, alert and oriented x4. Patient with ataxia of left upper extremity and endorses numbness of left hand. Left upper extremity mildly weaker. 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 Will order chest x-ray and consider sputum culture based upon results.     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 XXXA  ICD-9-CM: W264.0               Past Medical History:      has a past medical history of Depression, Diabetes ([facility] Falls (4/26/2013), Hearing loss (4/26/2013), Hemorrhagic stroke ([facility] (02/11/2019), Memory loss (4/26/2013), Ringing in ears (4/26/2013), and Stroke ([facility] Past Surgical History:      has a past surgical history that includes neurological procedure unlisted and hx cholecystectomy. Home Medications:     Prior to Admission medications    Medication Sig Start Date End Date Taking? Authorizing Provider   memantine (Namenda) 5 mg tablet Take 1 Tab by mouth two (2) times a day. 6/11/20   Rodney Hurd MD   donepeziL (ARICEPT) 10 mg tablet TAKE ONE TABLET BY MOUTH DAILY  Patient taking differently: Take 5 mg by mouth nightly. 4/22/20   Rodney Hurd MD   lisinopril (PRINIVIL, ZESTRIL) 20 mg tablet Take  by mouth daily. Provider, Historical   insulin lispro (HUMALOG) 100 unit/mL injection 4 Units by SubCUTAneous route as needed. Provider, Historical   insulin glargine (LANTUS) 100 unit/mL injection 11 Units by SubCUTAneous route nightly. Provider, Historical       Allergies/Social/Family History:      Allergies   Allergen Reactions    Bactrim [Sulfamethoprim] Unknown (comments)    Dilantin [Phenytoin Sodium Extended] Unknown (comments)    Doxycycline Unknown (comments)    Lipitor [Atorvastatin] Myalgia    Pravastatin Myalgia    Sertraline Unknown (comments)    Sulfur Diarrhea    Tegretol [Carbamazepine] Unknown (comments)    Zocor [Simvastatin] Myalgia      Social History     Tobacco Use    Smoking status: Current Every Day Smoker    Smokeless tobacco: Never Used   Substance Use Topics    Alcohol use: No      Family History   Problem Relation Age of Onset    Dementia Maternal Grandfather     No Known Problems Mother     No Known Problems Father        Subjective:   Patient does report increased cough and increased sputum and some chest discomfort with the cough only. Denies any shortness of breath, wheezing, chest tightness. Denies any fever or chills. Objective:   Vital Signs:  Visit Vitals  /66   Pulse 86   Temp 98 °F (36.7 °C)   Resp 19   Ht 5' 10\" (1.778 m)   Wt 74.6 kg (164 lb 7.4 oz)   SpO2 94%   BMI 23.60 kg/m²      O2 Device: Room air Temp (24hrs), Av.3 °F (36.8 °C), Min:98 °F (36.7 °C), Max:99 °F (37.2 °C)           Intake/Output:     Intake/Output Summary (Last 24 hours) at 2020 1604  Last data filed at 2020 1124  Gross per 24 hour   Intake 520 ml   Output 550 ml   Net -30 ml       Physical Exam:    General:  Alert, cooperative, well noursished, well developed, appears stated age   Eyes:  Sclera anicteric. Pupils equally round and reactive to light. Mouth/Throat: Mucous membranes normal, oral pharynx clear   Neck: Supple   Lungs:   Clear to auscultation bilaterally, good effort   CV:  Regular rate and rhythm,no murmur, click, rub or gallop   Abdomen:   Soft, non-tender.  bowel sounds normal. non-distended   Extremities: No cyanosis or edema   Skin: Skin color, texture, turgor normal. no acute rash or lesions   Lymph nodes: Cervical and supraclavicular normal   Musculoskeletal: No swelling or deformity   Lines/Devices:  Intact, no erythema, drainage or tenderness   Psych/neuro: Alert and oriented, normal mood affect. Slight left upper extremity weakness       LABS AND  DATA: Personally reviewed  Recent Labs     08/08/20 0428 08/07/20 0316   WBC 7.2 8.1   HGB 12.6 11.1*   HCT 39.1 35.0*    173     Recent Labs     08/08/20 0428 08/07/20 0316    142   K 4.1 4.0   * 113*   CO2 24 25   BUN 28* 25*   CREA 1.26 1.13    79   CA 8.6 7.7*   MG 2.3 2.2   PHOS 3.4 3.2     No results for input(s): AP, TBIL, TP, ALB, GLOB, AML, LPSE in the last 72 hours. No lab exists for component: SGOT, GPT, AMYP  Recent Labs     08/08/20 0428 08/07/20 0315   INR 1.1 1.1   PTP 10.9 11.0   APTT  --  30.9      No results for input(s): PHI, PCO2I, PO2I, FIO2I in the last 72 hours. No results for input(s): CPK, CKMB, TROIQ, BNPP in the last 72 hours. ABCDEF Bundle/Checklist Completed:  Yes    DISPOSITION  Ok to transfer out of ICU when ok with Neurosurgery. CRITICAL CARE CONSULTANT NOTE  I had a face to face encounter with the patient, reviewed and interpreted patient data including clinical events, labs, images, vital signs, I/O's, and examined patient. I have discussed the case and the plan and management of the patient's care with the consulting services, the bedside nurses and the respiratory therapist.      NOTE OF PERSONAL INVOLVEMENT IN CARE   This patient has a high probability of imminent, clinically significant deterioration, which requires the highest level of preparedness to intervene urgently. I participated in the decision-making and personally managed or directed the management of the following life and organ supporting interventions that required my frequent assessment to treat or prevent imminent deterioration. I personally spent 35 minutes of critical care time. This is time spent at this critically ill patient's bedside actively involved in patient care as well as the coordination of care and discussions with the patient's family.   This does not include any procedural time which has been billed separately.

## 2020-08-08 NOTE — PROGRESS NOTES
Neurocritical Care Brief Progress Note:    67year-old male with history of right parietal occipital hemorrhagic stroke, DM2, memory loss, and depression who was transferred to [facility] from [address] [place] [address] after presenting with symptoms of left hand numbness, clumsiness and weakness for several days. Patient reportedly fell and hit his head. Patient has a previous stroke and has chronic left-sided visual field deficits and difficulty with balance. Patient reportedly falls frequently. He also has a history of numbness and tingling chronically on the left leg due to a history of meningitis. CT of the Head was completed on arrival which showed an acute right parietal hemorrhage at the vertex. Neurosurgery and Neurology was consulted for further management. No neurosurgical intervention needed at this time. MRA was completed on this admission and there was noted an incidental finding of a 3 mm right A1-A2 aneurysm vs infundibulum. NIS was consulted for further evaluation. Also noted, was a right parotid mass in which otolaryngology was consulted. Patient is awake is doing well. He is alert, but pleasantly confused. He denies any headache, vision changes, chest pain, or SOB. RN reports patient is not doing well swallowing. Physical Exam:  Gen: NAD, calm, cooperative  Neuro: Alert. Confused. Oriented to name and situation. Disoriented to place and time. Follows commands. Speech clear. Able to repeat sentences and name objects. Mild right facial asymmetry at rest. Affect normal. PERRL, 3 mm bilaterally. EOMI. Left-sided peripheral visual field cut, otherwise visual fields intact. Palate symmetric. Tongue midline. Brook spontaneously. LUE drift and contracted. 4/5 strength in LUE. 5/5 Strength in RUE, RLE, and LLE. No ataxia with FTN on right hand. Difficult to assess FTN on left hand due to weakness. Incoordination noted with HTS bilaterally. Extinction present when simultaneously touching both arms, otherwise no neglect. Gait deferred. Skin: Ecchymosis noted on BUE. Plan:  Right Parietal Hemorrhage  - as noted on CT  - MRI of brain showed stable right parietal intraparenchymal hemorrhage with mild surrounding  edema, but no significant mass effect or midline shift. Noted punctate acute infarct in the left inferior parietal lobe. Chronic hemorrhages with associated encephalomalacia in the bilateralparieto-occipital lobes, right larger than left. Superficial siderosis from remote hemorrhage within the right parieto-occipital and temporal lobes. Marked generalized parenchymal volume loss and severe chronic microvascular ischemic disease. Small chronic infarcts in the bilateral cerebellum.  - MRA of Head and Neck showed no evidence of significant stenosis or vascular malformation. A 3 mm aneurysm versus infundibulum projecting medially at the right A1-A2 junction. Incompletely evaluated mass in the right parotid tail measuring 1.8 x 1.3 cm.  - Hgb A1C 6.5, management per primary team  - Lipid panel shows LDL 61.6, recommend high statin therapy when able to take PO given history of multiple strokes  - SBP goal < 140, Cardene, Hydralazine/Labetalol PRN  - on low dose Keppra 240 mg BID due to renal function for seizure ppx  - MRI of Brain with contrast pending to assess for any structural lesion as the etiology of ICH given patient has a right parotid mass noted on MRA. - no antiplatelets at this time due to [address]  - PT/OT/SLP evals  - neuro checks every hour  - Neurology following  - Neurosurgery following (no neurosurgical intervention needed)    Right Parotid Mass  - noted on MRA  - MRI of Orbits/Face and Neck WWO pending  - Otolaryngology consulted for further evaluation    Right A1-A2 junction unruptured cerebral aneurysm  - noted on MRA  - NIS consulted  - follow up with NIS outpatient upon discharge for imaging surveillance     Plan discussed with Dr. Sara Ambrosio (Neurology Attending), Caryle Murphy, and patient.      Vanessa Mills MARGARET Beltran  Neurocritical Care Nurse Practitioner  [phone]    Addendum: [id number]: Discussed plan with patient's wife at bedside. She informed that they lost their oldest son last September with a rare cancer with mets to the brain. 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 Wife also noted extensive bruising to BUE which was not present yesterday. Patient is not on any blood thinners. ? Possible from patient having frequent falls. CBC WNL.  Discussed with Intensivist Jessica Coppola NP.

## 2020-08-08 NOTE — CONSULTS
MARGARET Dave    Patient: Patti Haq MRN: [id number]  SSN: [id number]    YOB: 1940  Age: 67 y.o. Sex: male      Chief Complaint:    Subjective:      Patti Haq is a 67 y.o. male  with history of right parietal occipital hemorrhagic stroke, DM2, memory loss and depression was transferred to [facility] ICU for a higher level of care after presenting to the [facility] ER with symptoms of left hand numbness, clumsiness and weakness for several days. Patient stated that he fell earlier today but hit the left side of his head. On ER record, his wife stated that since his previous stroke he has left-sided visual field deficits and difficulty with balance and falls frequently. Patient states he has a history of numbness and tingling in his left leg for 20 years due to history of meningitis. He had CT on arrival to [facility] ED and an acute right parietal hemorrhage was noted at the vertex. Dr. Surya Morrison in Neurosurgery was consulted by the ER doctor and he recommended admission to [facility] for further evaluation and treatment. Patient was transferred to [facility] ICU in stable condition.  Patient's ICH has been stable. On MRI/MRA today there was an incidental finding of a 3 mm aneurysm versus infundibulum projecting medially at the right A1-A2 junction. Neurointerventional Surgery was consulted to evaluate this.      Past Medical History:   Diagnosis Date    Depression     Diabetes ([facility] [address]     Falls 4/26/2013    Hearing loss 4/26/2013    Hemorrhagic stroke ([facility] [address] 02/11/2019    Memory loss 4/26/2013    Ringing in ears 4/26/2013    Stroke ([facility] [address]      Family History   Problem Relation Age of Onset    Dementia Maternal Grandfather     No Known Problems Mother     No Known Problems Father      Social History     Tobacco Use    Smoking status: Current Every Day Smoker    Smokeless tobacco: Never Used   Substance Use Topics    Alcohol use: No      Prior to Admission Medications   Prescriptions Last Dose Informant Patient Reported? Taking?   donepeziL (ARICEPT) 10 mg tablet   No No   Sig: TAKE ONE TABLET BY MOUTH DAILY   Patient taking differently: Take 5 mg by mouth nightly. insulin glargine (LANTUS) 100 unit/mL injection   Yes No   Si Units by SubCUTAneous route nightly. insulin lispro (HUMALOG) 100 unit/mL injection   Yes No   Si Units by SubCUTAneous route as needed. lisinopril (PRINIVIL, ZESTRIL) 20 mg tablet   Yes No   Sig: Take  by mouth daily. memantine (Namenda) 5 mg tablet   No No   Sig: Take 1 Tab by mouth two (2) times a day. Facility-Administered Medications: None       Allergies   Allergen Reactions    Bactrim [Sulfamethoprim] Unknown (comments)    Dilantin [Phenytoin Sodium Extended] Unknown (comments)    Doxycycline Unknown (comments)    Lipitor [Atorvastatin] Myalgia    Pravastatin Myalgia    Sertraline Unknown (comments)    Sulfur Diarrhea    Tegretol [Carbamazepine] Unknown (comments)    Zocor [Simvastatin] Myalgia       Review of Systems:  Denies chest pain, leg pain, nausea, vomiting, difficulty swallowing, headache, visual disturbance, and dyspnea. Objective:     Vitals:    20 1800 20 1830 20 2000 20 2100   BP: 152/46 138/50 121/72 149/57   Pulse: (!) 45 62 63 (!) 40   Resp: 17 18 18 16   Temp:   98.2 °F (36.8 °C)    SpO2:   96% (!) 89%   Weight:       Height:             Physical Exam:  GENERAL: Calm, cooperative, NAD  SKIN: Warm, dry, color appropriate for ethnicity. Bruising noted on bilateral arms. Neurologic Exam:  Mental Status:             Alert and oriented x 3. Appropriate affect, mood and behavior.        Language:                   Normal fluency, repetition, comprehension and naming.     Cranial Nerves:           Pupils 3 mm, equal, round and reactive to light. Visual fields full to confrontation.                                       Extraocular movements intact. Facial sensation intact. Full facial strength, no asymmetry. Hard of hearing. No dysarthria. Tongue protrudes to midline, palate elevates symmetrically. Shoulder shrug 5/5 bilaterally.                                         Motor:                          No pronator drift. Bulk and tone normal.                                       5/5 power in all extremities proximally and distally. Mildly weaker  in the                                left hand. No involuntary movements.     Sensation:                   Sensation intact throughout to light touch. Reflexes:                      Negative Babinskis     Coordination & Gait:   Gait not assessed. FTN and HTS with ataxia noted in the left upper and left lower extremities. Labs:  Lab Results   Component Value Date/Time    WBC 8.1 08/07/2020 03:16 AM    HGB 11.1 (L) 08/07/2020 03:16 AM    HCT 35.0 (L) 08/07/2020 03:16 AM    PLATELET 167 84/76/9683 03:16 AM    MCV 99.7 (H) 08/07/2020 03:16 AM      Lab Results   Component Value Date/Time    Sodium 142 08/07/2020 03:16 AM    Potassium 4.0 08/07/2020 03:16 AM    Chloride 113 (H) 08/07/2020 03:16 AM    CO2 25 08/07/2020 03:16 AM    Anion gap 4 (L) 08/07/2020 03:16 AM    Glucose 79 08/07/2020 03:16 AM    BUN 25 (H) 08/07/2020 03:16 AM    Creatinine 1.13 08/07/2020 03:16 AM    BUN/Creatinine ratio 22 (H) 08/07/2020 03:16 AM    GFR est AA >60 08/07/2020 03:16 AM    GFR est non-AA >60 08/07/2020 03:16 AM    Calcium 7.7 (L) 08/07/2020 03:16 AM     No results found for: CPK, RCK1, RCK2, RCK3, RCK4, CKMB, CKNDX, CKND1, TROPT, TROIQ, BNPP, BNP    Imaging:  MRI Brain/MRA head and neck 8/7/20    IMPRESSION  IMPRESSION:   MRI Brain:  1.  Stable right parietal intraparenchymal hemorrhage with mild surrounding  edema, but no significant mass effect or midline shift. 2. Punctate acute infarct in the left inferior parietal lobe. 3. Chronic hemorrhages with associated encephalomalacia in the bilateral  parieto-occipital lobes, right larger than left. Superficial siderosis from  remote hemorrhage within the right parieto-occipital and temporal lobes. 4. Marked generalized parenchymal volume loss and severe chronic microvascular  ischemic disease. Small chronic infarcts in the bilateral cerebellum.     MRA Head:  1. No evidence of significant stenosis. No evidence of vascular malformation. 2. A 3 mm aneurysm versus infundibulum projecting medially at the right A1-A2  junction.     MRA Neck:  1. No evidence of flow-limiting stenosis. 2. Incompletely evaluated mass in the right parotid tail measuring 1.8 x 1.3 cm. Recommend CT neck for further evaluation. CT Results (maximum last 3): Results from [place] encounter on 08/06/20   CT HEAD WO CONT    Narrative EXAM: CT HEAD WO CONT    INDICATION: evaluation of parietal hemorrhage    COMPARISON: None. CONTRAST: None. TECHNIQUE: Unenhanced CT of the head was performed using 5 mm images. Brain and  bone windows were generated. Coronal and sagittal reformats. CT dose reduction  was achieved through use of a standardized protocol tailored for this  examination and automatic exposure control for dose modulation. FINDINGS:  EXAM: CT HEAD WO CONT    INDICATION: left arm weakness and numbness    COMPARISON: 3/15/2019. CONTRAST: None. TECHNIQUE: Unenhanced CT of the head was performed using 5 mm images. Brain and  bone windows were generated. Coronal and sagittal reformats. CT dose reduction  was achieved through use of a standardized protocol tailored for this  examination and automatic exposure control for dose modulation.       FINDINGS:  Extensive confluent periventricular hypodensity. Subacute partially layering right parietal hemorrhage at the vertex measuring  3.5 x 2.1 previously 2.4 x 3.6 cm. No intraventricular hemorrhage. The patient  is status post right occipital craniectomy cavernous carotid vascular  calcifications. Sulcal and ventricular prominence. IMPRESSION  Subacute right parietal hemorrhage is slightly diminished in size compared to  the prior examination. Severe chronic microvascular ischemic change and moderate to severe temporal  predominant cerebral atrophy. Atherosclerotic cerebral vasculopathy. Results from [place] encounter on 08/06/20   CT HEAD WO CONT    Narrative EXAM: CT HEAD WO CONT    INDICATION: left arm weakness and numbness    COMPARISON: 3/15/2019. CONTRAST: None. TECHNIQUE: Unenhanced CT of the head was performed using 5 mm images. Brain and  bone windows were generated. Coronal and sagittal reformats. CT dose reduction  was achieved through use of a standardized protocol tailored for this  examination and automatic exposure control for dose modulation. FINDINGS:  Enlarged compatible with the overall degree of volume loss. . Encephalomalacia  right hemisphere with low density in the periventricular white matter again  noted. Acute right parietal hemorrhage at the vertex measuring 2.4 x 3.6 x 3.4  cm. Estimated volume 14.7 cc The hemorrhage has a fluid fluid level in it. . No  mass effect or midline shift. . . The bone windows demonstrate post right occipital craniectomy. . The visualized  portions of the paranasal sinuses and mastoid air cells are clear. Impression IMPRESSION:   Acute right parietal hemorrhage at the vertex. The findings were called to Dr. Ferrel Jeans on 8/6/2020 at [address] [place] [address] by myself.   [id number]       Results from Hospital Encounter encounter on 03/15/19   CT HEAD WO CONT    Narrative EXAM:  CT HEAD WO CONT  INDICATION:  Stroke; occipital ICH, nontraumatic cortical hemorrhage of right  cerebral hemisphere, I 61.1,  TECHNIQUE:  Thin axial images were obtained through the calvarium and saved with standard  and bone window algorithm. Coronal and sagittal reconstructions were generated. CT dose reduction was achieved through use of a standardized protocol tailored  for this examination and automatic exposure control for dose modulation. COMPARISON: None available. FINDINGS:  The patient's recent neurology office note describes history of right posterior  cerebral hematoma demonstrated on imaging while at [facility]  last month. There is an ill-defined area of relative decreased attenuation in the right  posterior parietal/occipital lobe measuring approximately 2.3 x 3.8 x 2.9 cm. Central heterogeneity may represent residual blood products. No high density demonstrated with in the brain to suggest acute hemorrhage. No abnormal extra-axial fluid collections. Multifocal and confluent decreased attenuation in the cerebral white matter  suggesting chronic small vessel ischemic changes possibly progressed when  compared to an MRI 6/15/15. Again demonstrated is generalized prominence of the ventricles without evidence  of obstructive hydrocephalus and somewhat out of proportion to sulcal size also  similar to the MRI 6/15/15. Prior bilateral mastoidectomies. Structures of the skull base including  paranasal sinuses are otherwise unremarkable. Impression IMPRESSION:  1. Approximately 3 cm area of abnormal density right posterior parietal  occipital lobe is apparently related to a resolving parenchymal hematoma. 2. No acute intracranial hemorrhage. 3. Chronic generalized volume loss and white matter disease similar to prior  exams.      Assessment:     Hospital Problems  Date Reviewed: 10/15/2019          Codes Class Noted POA    ICH (intracerebral hemorrhage) ([facility] [address] ICD-10-CM: I61.9  ICD-9-CM: 029  8/6/2020 Unknown            Plan:     1) Intracerebral Hemorrhage, ICH score: 0              - CT head showed acute parietal hemorrhage at the vertex with a volume of  14.7  cc.               -Repeat CT head 8/7/20 showed subacute right parietal hemorrhage that is slightly diminished in size compared to the prior exam. There is severe chronic microvascular ischemic change and moderate to severe temporal predominant cerebral atrophy. Atherosclerotic cerebral vasculopathy noted. - MRI of the brain on 8/7/20 showed right parietal intraparenchymal hemorrhage with mild surrounding edema with no significant mass effect or midline shift. There is a punctate acute infarct in the left inferior parietal lobe. There are chronic hemorrhages with associated encephalomalacia in the bilateral parieto-occipital lobes, right larger than the left with superficial siderosis from a remote hemorrhage within the right parieto-occipital and temporal lobes. There is no flow-limiting stenoses or LVO noted in the cerebral or cervical arteries. A 2 mm aneurysm versus infundibulum projecting medially at the right A1-A2 junction. Also of note was an incompletely evaluated mass in the right parotid tail measuring 1.8 x 1.3 cm.              - SBP goal less than 140, Cardene/Labetalol PRN              - Keppra 250 BID for seizure ppx. Patient with decreased creatinine clearance. - q1 neuro checks               - A1C and lipid panel pending, LDL goal <70              - ECHO ordered              - PT/OT/SLP evals              - Stroke education              - NSGY following   -Neurology following     2.) Parotid mass - Incompletely evaluated mass in the right parotid tail measuring 1.8 x 1.3 cm noted on MRA of the neck. -- MRI of brain and neck with contrast ordered due to parotid mass noted on  previous MRI/MRA without to rule out underlying lesion for reason of ICH. -ENT consulted by Intensivist    3.) Incidental finding of cerebral aneurysm.  There was a 3 mm aneurysm versus infundibulum projecting medially at the right A1-A2 junction. This small aneurysm is not associated with the patient's current ICH. -Maintain SBP less than 140   -Smoking cessation    -Consider Diagnostic Cerebral Angiogram   -Will await NIS consult with Dr. Farida Monroy to determine what course of management  whether conservative surveillance or intervention is appropriate in this matter. I have discussed the diagnosis and the intended plan as seen in the above orders with Dr. Farida Monroy, the patient and the primary RN. Patientupdated on current plan of care and is in agreement. All questions were answered. Will await further recommendations by Dr. Farida Monroy with NeuroInterventional Surgery. Thank you for this consult and participating in the care of this patient.   Signed By: Diaz Pandey NP     August 7, 2020

## 2020-08-08 NOTE — PROGRESS NOTES
Neurology Progress Note  Anat Duong NP    Admit Date: 2020   LOS: 2 days      Daily Progress Note: 2020    HPI:  Can Floyd is a 67 y.o. male with history of right parietal occipital hemorrhagic stroke, DM2, memory loss and depression was transferred to [facility] ICU for a higher level of care after presenting to the [address] [place] [address] ER with symptoms of left hand numbness, clumsiness and weakness for several days. Patient stated that he fell earlier today but hit the left side of his head. On ER record, his wife stated that since his previous stroke he has left-sided visual field deficits and difficulty with balance and falls frequently. Patient states he has a history of numbness and tingling in his left leg for 20 years due to history of meningitis. He had CT on arrival to [facility] ED and an acute right parietal hemorrhage was noted at the vertex. Dr. Tello Franz in Neurosurgery was consulted by the ER doctor and he recommended admission to [facility] for further evaluation and treatment. Patient was transferred to [facility] ICU in stable condition. Subjective:   Patient somewhat restless and having some itching overnight. He is noted of having some bruising on his arms and states it itches. Intensivist following this. Patient may be sundowning and does have a history of dementia. Allergies   Allergen Reactions    Bactrim [Sulfamethoprim] Unknown (comments)    Dilantin [Phenytoin Sodium Extended] Unknown (comments)    Doxycycline Unknown (comments)    Lipitor [Atorvastatin] Myalgia    Pravastatin Myalgia    Sertraline Unknown (comments)    Sulfur Diarrhea    Tegretol [Carbamazepine] Unknown (comments)    Zocor [Simvastatin] Myalgia       Review of Systems:     Denies chest pain, leg pain, nausea, vomiting, difficulty swallowing, headache, visual disturbance, and dyspnea.      Objective:   Vital signs  Temp (24hrs), Av.3 °F (36.8 °C), Min:98 °F (36.7 °C), Max:99 °F (37.2 °C)    1901 -  0700  In: 120 [P.O.:120]  Out: 0   08/06 0701 - 08/07 1900  In: 11.5 [I.V.:11.5]  Out: 450 [Urine:450]  Visit Vitals  BP (!) 116/38 (BP 1 Location: Left arm, BP Patient Position: At rest)   Pulse (!) 38   Temp 99 °F (37.2 °C)   Resp 14   Ht 5' 10\" (1.778 m)   Wt 73.9 kg (163 lb)   SpO2 93%   BMI 23.39 kg/m²      O2 Device: Room air   Vitals:    08/07/20 2200 08/07/20 2230 08/07/20 2300 08/08/20 0000   BP: 149/45 106/75 136/45 (!) 116/38   Pulse: 62 (!) 57 (!) 38 (!) 38   Resp: 19 18 17 14   Temp:    99 °F (37.2 °C)   SpO2: 93% 98% 96% 93%   Weight:       Height:             Physical Exam:  GENERAL: Restless, cooperative, NAD  SKIN: Warm, dry, color appropriate for ethnicity. Bruising noted on bilateral arms. Neurologic Exam:  Mental Status:             Alert and oriented x 3. Appropriate affect, mood and behavior.        Language:                   Normal fluency, repetition, comprehension and naming.     Cranial Nerves:           Pupils 3 mm, equal, round and reactive to light. Visual fields full to confrontation. Extraocular movements intact. Facial sensation intact. Full facial strength, no asymmetry. Hard of hearing. No dysarthria. Tongue protrudes to midline, palate elevates symmetrically. Shoulder shrug 5/5 bilaterally.                                         Motor:                          No pronator drift. Bulk and tone normal.                                       5/5 power in all extremities proximally and distally. Mildly weaker  in the                               left hand.                                        No involuntary movements.     Sensation:                   Sensation intact throughout to light touch. Reflexes:                      Negative Babinskis     Coordination & Gait:   Gait not assessed. FTN and HTS with ataxia noted in the left upper and left lower extremities.        Labs:  Lab Results   Component Value Date/Time    WBC 8.1 08/07/2020 03:16 AM    HGB 11.1 (L) 08/07/2020 03:16 AM    HCT 35.0 (L) 08/07/2020 03:16 AM    PLATELET 880 80/63/2118 03:16 AM    MCV 99.7 (H) 08/07/2020 03:16 AM     Lab Results   Component Value Date/Time    Sodium 142 08/07/2020 03:16 AM    Potassium 4.0 08/07/2020 03:16 AM    Chloride 113 (H) 08/07/2020 03:16 AM    CO2 25 08/07/2020 03:16 AM    Anion gap 4 (L) 08/07/2020 03:16 AM    Glucose 79 08/07/2020 03:16 AM    BUN 25 (H) 08/07/2020 03:16 AM    Creatinine 1.13 08/07/2020 03:16 AM    BUN/Creatinine ratio 22 (H) 08/07/2020 03:16 AM    GFR est AA >60 08/07/2020 03:16 AM    GFR est non-AA >60 08/07/2020 03:16 AM    Calcium 7.7 (L) 08/07/2020 03:16 AM       Imaging:    CT Results (maximum last 3): Results from [place] encounter on 08/06/20   CT HEAD WO CONT    Narrative EXAM: CT HEAD WO CONT    INDICATION: evaluation of parietal hemorrhage    COMPARISON: None. CONTRAST: None. TECHNIQUE: Unenhanced CT of the head was performed using 5 mm images. Brain and  bone windows were generated. Coronal and sagittal reformats. CT dose reduction  was achieved through use of a standardized protocol tailored for this  examination and automatic exposure control for dose modulation. FINDINGS:  EXAM: CT HEAD WO CONT    INDICATION: left arm weakness and numbness    COMPARISON: 3/15/2019. CONTRAST: None. TECHNIQUE: Unenhanced CT of the head was performed using 5 mm images. Brain and  bone windows were generated. Coronal and sagittal reformats. CT dose reduction  was achieved through use of a standardized protocol tailored for this  examination and automatic exposure control for dose modulation.       FINDINGS:  Extensive confluent periventricular hypodensity. Subacute partially layering right parietal hemorrhage at the vertex measuring  3.5 x 2.1 previously 2.4 x 3.6 cm. No intraventricular hemorrhage. The patient  is status post right occipital craniectomy cavernous carotid vascular  calcifications. Sulcal and ventricular prominence. IMPRESSION  Subacute right parietal hemorrhage is slightly diminished in size compared to  the prior examination. Severe chronic microvascular ischemic change and moderate to severe temporal  predominant cerebral atrophy. Atherosclerotic cerebral vasculopathy. Results from [place] encounter on 08/06/20   CT HEAD WO CONT    Narrative EXAM: CT HEAD WO CONT    INDICATION: left arm weakness and numbness    COMPARISON: 3/15/2019. CONTRAST: None. TECHNIQUE: Unenhanced CT of the head was performed using 5 mm images. Brain and  bone windows were generated. Coronal and sagittal reformats. CT dose reduction  was achieved through use of a standardized protocol tailored for this  examination and automatic exposure control for dose modulation. FINDINGS:  Enlarged compatible with the overall degree of volume loss. . Encephalomalacia  right hemisphere with low density in the periventricular white matter again  noted. Acute right parietal hemorrhage at the vertex measuring 2.4 x 3.6 x 3.4  cm. Estimated volume 14.7 cc The hemorrhage has a fluid fluid level in it. . No  mass effect or midline shift. . . The bone windows demonstrate post right occipital craniectomy. . The visualized  portions of the paranasal sinuses and mastoid air cells are clear. Impression IMPRESSION:   Acute right parietal hemorrhage at the vertex. The findings were called to Dr. Shailesh Pereira on 8/6/2020 at [address] [place] [address] by myself.   [id number]       Results from Hospital Encounter encounter on 03/15/19   CT HEAD WO CONT    Narrative EXAM:  CT HEAD WO CONT  INDICATION:  Stroke; occipital ICH, nontraumatic cortical hemorrhage of right  cerebral hemisphere, I 61.1,  TECHNIQUE:  Thin axial images were obtained through the calvarium and saved with standard  and bone window algorithm. Coronal and sagittal reconstructions were generated. CT dose reduction was achieved through use of a standardized protocol tailored  for this examination and automatic exposure control for dose modulation. COMPARISON: None available. FINDINGS:  The patient's recent neurology office note describes history of right posterior  cerebral hematoma demonstrated on imaging while at [facility]  last month. There is an ill-defined area of relative decreased attenuation in the right  posterior parietal/occipital lobe measuring approximately 2.3 x 3.8 x 2.9 cm. Central heterogeneity may represent residual blood products. No high density demonstrated with in the brain to suggest acute hemorrhage. No abnormal extra-axial fluid collections. Multifocal and confluent decreased attenuation in the cerebral white matter  suggesting chronic small vessel ischemic changes possibly progressed when  compared to an MRI 6/15/15. Again demonstrated is generalized prominence of the ventricles without evidence  of obstructive hydrocephalus and somewhat out of proportion to sulcal size also  similar to the MRI 6/15/15. Prior bilateral mastoidectomies. Structures of the skull base including  paranasal sinuses are otherwise unremarkable. Impression IMPRESSION:  1. Approximately 3 cm area of abnormal density right posterior parietal  occipital lobe is apparently related to a resolving parenchymal hematoma. 2. No acute intracranial hemorrhage. 3. Chronic generalized volume loss and white matter disease similar to prior  exams.      Assessment:   Active Problems:    ICH (intracerebral hemorrhage) ([facility] [address] (8/6/2020)      Plan:        1) Intracerebral Hemorrhage, ICH score: 0              - CT head showed acute parietal hemorrhage at the vertex with a volume of 14.7  cc.               -Repeat CT head 8/7/20 showed subacute right parietal hemorrhage that is slightly diminished in size compared to the prior exam. There is severe chronic microvascular ischemic change and moderate to severe temporal predominant cerebral atrophy. Atherosclerotic cerebral vasculopathy noted.               - MRI of the brain on 8/7/20 showed right parietal intraparenchymal hemorrhage with mild surrounding edema with no significant mass effect or midline shift. There is a punctate acute infarct in the left inferior parietal lobe. There are chronic hemorrhages with associated encephalomalacia in the bilateral parieto-occipital lobes, right larger than the left with superficial siderosis from a remote hemorrhage within the right parieto-occipital and temporal lobes. There is no flow-limiting stenoses or LVO noted in the cerebral or cervical arteries. A 2 mm aneurysm versus infundibulum projecting medially at the right A1-A2 junction. Also of note was an incompletely evaluated mass in the right parotid tail measuring 1.8 x 1.3 cm.              - SBP goal less than 140, Cardene/Labetalol PRN              - Keppra 250 BID for seizure ppx. Patient with decreased creatinine clearance.               - q1 neuro checks               - A1C and lipid panel pending, LDL goal <70              - ECHO ordered              - PT/OT/SLP evals              - Stroke education              - NSGY following   -MRI of the brain and neck with contrast              -Neurology following      2.) Parotid mass - Incompletely evaluated mass in the right parotid tail measuring 1.8 x 1.3 cm noted on MRA of the neck. -- MRI of brain and neck with contrast ordered due to parotid mass noted on  previous MRI/MRA without to rule out underlying lesion for reason of ICH. -ENT consulted by Aleida Scales     3.) Incidental finding of cerebral aneurysm.  There was a 3 mm aneurysm versus infundibulum projecting medially at the right A1-A2 junction. This small aneurysm is not associated with the patient's current ICH. -Maintain SBP less than 140              -Smoking cessation               -Consider Diagnostic Cerebral Angiogram   - Will await NIS consult with Dr. Dajuan Ram to determine what course of management whether conservative surveillance or intervention is appropriate in this matter.          Plan discussed with Dr. Jayson Alas, the patient and the primary RN. The patient verbalized understanding of the current plan of care and is in agreement. Will await further recommendations from Dr. Jayson Alas.      Raul Kraus NP  Neurocritical Care Nurse Practitioner

## 2020-08-08 NOTE — PROGRESS NOTES
1930: Bedside and Verbal shift change report given to Sowmya Serrano (oncoming nurse) by Lita Bustos (offgoing nurse). Report included the following information SBAR, Kardex, ED Summary, MAR, Recent Results, and Cardiac Rhythm sinus preet . 2000: Pt complaints of itching in right arm. Right arm bruised and red. Due to itching, pt is restless and pulling at lines. MARGARET Renteria notified and orders received for atarax. IV reinforced, new condom cath applied, and EKG electrodes changed. 2200: Pt continues to pull at lines. Restraints applied. Itching continues. MARGARET Renteria ordered second atarax dose and hydrocortisone cream.     2300: Pt states that itching has stopped after the second dose of atarax.

## 2020-08-08 NOTE — PROGRESS NOTES
0730: Bedside shift change report given to Mishel Maharaj RN (oncoming nurse) by Leona Velazquez (offgoing nurse). Report included the following information SBAR, Kardex, ED Summary, Procedure Summary, Intake/Output, MAR, Accordion, Recent Results, Med Rec Status and Dual Neuro Assessment. 0830: Patient oriented but slightly confused, redirectable. Spoke with wife on phone and updated.

## 2020-08-09 ENCOUNTER — APPOINTMENT (OUTPATIENT)
Dept: MRI IMAGING | Age: 80
DRG: 064 | End: 2020-08-09
Attending: PSYCHIATRY & NEUROLOGY
Payer: MEDICARE

## 2020-08-09 ENCOUNTER — APPOINTMENT (OUTPATIENT)
Dept: MRI IMAGING | Age: 80
DRG: 064 | End: 2020-08-09
Attending: NURSE PRACTITIONER
Payer: MEDICARE

## 2020-08-09 LAB
ANION GAP SERPL CALC-SCNC: 7 MMOL/L (ref 5–15)
BASOPHILS # BLD: 0 K/UL (ref 0–0.1)
BASOPHILS NFR BLD: 0 % (ref 0–1)
BUN SERPL-MCNC: 27 MG/DL (ref 6–20)
BUN/CREAT SERPL: 20 (ref 12–20)
CALCIUM SERPL-MCNC: 8.5 MG/DL (ref 8.5–10.1)
CHLORIDE SERPL-SCNC: 110 MMOL/L (ref 97–108)
CO2 SERPL-SCNC: 25 MMOL/L (ref 21–32)
CREAT SERPL-MCNC: 1.36 MG/DL (ref 0.7–1.3)
DIFFERENTIAL METHOD BLD: NORMAL
EOSINOPHIL # BLD: 0.3 K/UL (ref 0–0.4)
EOSINOPHIL NFR BLD: 3 % (ref 0–7)
ERYTHROCYTE [DISTWIDTH] IN BLOOD BY AUTOMATED COUNT: 12.5 % (ref 11.5–14.5)
GLUCOSE BLD STRIP.AUTO-MCNC: 113 MG/DL (ref 65–100)
GLUCOSE BLD STRIP.AUTO-MCNC: 114 MG/DL (ref 65–100)
GLUCOSE BLD STRIP.AUTO-MCNC: 173 MG/DL (ref 65–100)
GLUCOSE BLD STRIP.AUTO-MCNC: 90 MG/DL (ref 65–100)
GLUCOSE SERPL-MCNC: 105 MG/DL (ref 65–100)
HCT VFR BLD AUTO: 40.5 % (ref 36.6–50.3)
HGB BLD-MCNC: 13.1 G/DL (ref 12.1–17)
IMM GRANULOCYTES # BLD AUTO: 0 K/UL (ref 0–0.04)
IMM GRANULOCYTES NFR BLD AUTO: 0 % (ref 0–0.5)
INR PPP: 1.1 (ref 0.9–1.1)
LYMPHOCYTES # BLD: 1.5 K/UL (ref 0.8–3.5)
LYMPHOCYTES NFR BLD: 19 % (ref 12–49)
MAGNESIUM SERPL-MCNC: 2.3 MG/DL (ref 1.6–2.4)
MCH RBC QN AUTO: 31.7 PG (ref 26–34)
MCHC RBC AUTO-ENTMCNC: 32.3 G/DL (ref 30–36.5)
MCV RBC AUTO: 98.1 FL (ref 80–99)
MONOCYTES # BLD: 0.7 K/UL (ref 0–1)
MONOCYTES NFR BLD: 9 % (ref 5–13)
NEUTS SEG # BLD: 5.6 K/UL (ref 1.8–8)
NEUTS SEG NFR BLD: 69 % (ref 32–75)
NRBC # BLD: 0 K/UL (ref 0–0.01)
NRBC BLD-RTO: 0 PER 100 WBC
PHOSPHATE SERPL-MCNC: 3.7 MG/DL (ref 2.6–4.7)
PLATELET # BLD AUTO: 178 K/UL (ref 150–400)
PMV BLD AUTO: 9.5 FL (ref 8.9–12.9)
POTASSIUM SERPL-SCNC: 4 MMOL/L (ref 3.5–5.1)
PROTHROMBIN TIME: 10.9 SEC (ref 9–11.1)
RBC # BLD AUTO: 4.13 M/UL (ref 4.1–5.7)
SERVICE CMNT-IMP: ABNORMAL
SERVICE CMNT-IMP: NORMAL
SODIUM SERPL-SCNC: 142 MMOL/L (ref 136–145)
WBC # BLD AUTO: 8.2 K/UL (ref 4.1–11.1)

## 2020-08-09 PROCEDURE — 74011250636 HC RX REV CODE- 250/636: Performed by: INTERNAL MEDICINE

## 2020-08-09 PROCEDURE — 74011250637 HC RX REV CODE- 250/637: Performed by: NURSE PRACTITIONER

## 2020-08-09 PROCEDURE — 99232 SBSQ HOSP IP/OBS MODERATE 35: CPT | Performed by: PSYCHIATRY & NEUROLOGY

## 2020-08-09 PROCEDURE — 85610 PROTHROMBIN TIME: CPT

## 2020-08-09 PROCEDURE — 85025 COMPLETE CBC W/AUTO DIFF WBC: CPT

## 2020-08-09 PROCEDURE — 74011250636 HC RX REV CODE- 250/636: Performed by: NURSE PRACTITIONER

## 2020-08-09 PROCEDURE — 84100 ASSAY OF PHOSPHORUS: CPT

## 2020-08-09 PROCEDURE — 74011000258 HC RX REV CODE- 258: Performed by: NURSE PRACTITIONER

## 2020-08-09 PROCEDURE — A9575 INJ GADOTERATE MEGLUMI 0.1ML: HCPCS | Performed by: INTERNAL MEDICINE

## 2020-08-09 PROCEDURE — 74011000250 HC RX REV CODE- 250: Performed by: NURSE PRACTITIONER

## 2020-08-09 PROCEDURE — 36415 COLL VENOUS BLD VENIPUNCTURE: CPT

## 2020-08-09 PROCEDURE — 70543 MRI ORBT/FAC/NCK W/O &W/DYE: CPT

## 2020-08-09 PROCEDURE — 74011636637 HC RX REV CODE- 636/637: Performed by: NURSE PRACTITIONER

## 2020-08-09 PROCEDURE — 80048 BASIC METABOLIC PNL TOTAL CA: CPT

## 2020-08-09 PROCEDURE — 65660000000 HC RM CCU STEPDOWN

## 2020-08-09 PROCEDURE — 83735 ASSAY OF MAGNESIUM: CPT

## 2020-08-09 PROCEDURE — 70552 MRI BRAIN STEM W/DYE: CPT

## 2020-08-09 RX ORDER — HYDRALAZINE HYDROCHLORIDE 20 MG/ML
10 INJECTION INTRAMUSCULAR; INTRAVENOUS
Status: DISCONTINUED | OUTPATIENT
Start: 2020-08-09 | End: 2020-08-14 | Stop reason: HOSPADM

## 2020-08-09 RX ORDER — SODIUM CHLORIDE, SODIUM LACTATE, POTASSIUM CHLORIDE, CALCIUM CHLORIDE 600; 310; 30; 20 MG/100ML; MG/100ML; MG/100ML; MG/100ML
75 INJECTION, SOLUTION INTRAVENOUS CONTINUOUS
Status: DISCONTINUED | OUTPATIENT
Start: 2020-08-09 | End: 2020-08-14 | Stop reason: HOSPADM

## 2020-08-09 RX ORDER — GADOTERATE MEGLUMINE 376.9 MG/ML
15 INJECTION INTRAVENOUS
Status: COMPLETED | OUTPATIENT
Start: 2020-08-09 | End: 2020-08-09

## 2020-08-09 RX ORDER — SODIUM CHLORIDE 0.9 % (FLUSH) 0.9 %
10 SYRINGE (ML) INJECTION
Status: COMPLETED | OUTPATIENT
Start: 2020-08-09 | End: 2020-08-09

## 2020-08-09 RX ORDER — AMLODIPINE BESYLATE 5 MG/1
5 TABLET ORAL DAILY
Status: DISCONTINUED | OUTPATIENT
Start: 2020-08-09 | End: 2020-08-14 | Stop reason: HOSPADM

## 2020-08-09 RX ADMIN — Medication 10 ML: at 10:00

## 2020-08-09 RX ADMIN — AMLODIPINE BESYLATE 5 MG: 5 TABLET ORAL at 14:00

## 2020-08-09 RX ADMIN — Medication 10 ML: at 06:02

## 2020-08-09 RX ADMIN — MEMANTINE 5 MG: 5 TABLET ORAL at 09:08

## 2020-08-09 RX ADMIN — MEMANTINE 5 MG: 5 TABLET ORAL at 17:38

## 2020-08-09 RX ADMIN — FAMOTIDINE 20 MG: 10 INJECTION, SOLUTION INTRAVENOUS at 00:00

## 2020-08-09 RX ADMIN — HYDROCORTISONE: 1 OINTMENT TOPICAL at 17:38

## 2020-08-09 RX ADMIN — HYDRALAZINE HYDROCHLORIDE 10 MG: 20 INJECTION INTRAMUSCULAR; INTRAVENOUS at 10:41

## 2020-08-09 RX ADMIN — HYDROCORTISONE: 1 OINTMENT TOPICAL at 09:09

## 2020-08-09 RX ADMIN — Medication 10 ML: at 13:28

## 2020-08-09 RX ADMIN — LEVETIRACETAM 250 MG: 100 INJECTION, SOLUTION INTRAVENOUS at 00:06

## 2020-08-09 RX ADMIN — INSULIN LISPRO 2 UNITS: 100 INJECTION, SOLUTION INTRAVENOUS; SUBCUTANEOUS at 17:43

## 2020-08-09 RX ADMIN — GADOTERATE MEGLUMINE 15 ML: 376.9 INJECTION INTRAVENOUS at 10:00

## 2020-08-09 RX ADMIN — Medication 10 ML: at 22:20

## 2020-08-09 RX ADMIN — DONEPEZIL HYDROCHLORIDE 10 MG: 10 TABLET, FILM COATED ORAL at 22:20

## 2020-08-09 RX ADMIN — SODIUM CHLORIDE, SODIUM LACTATE, POTASSIUM CHLORIDE, AND CALCIUM CHLORIDE 75 ML/HR: 600; 310; 30; 20 INJECTION, SOLUTION INTRAVENOUS at 13:24

## 2020-08-09 RX ADMIN — LEVETIRACETAM 250 MG: 100 INJECTION, SOLUTION INTRAVENOUS at 11:51

## 2020-08-09 NOTE — PROGRESS NOTES
.Neurocritical Care Brief Progress Note:    67year-old male with history of right parietal occipital hemorrhagic stroke, DM2, memory loss, and depression who was transferred to [facility] from [address] [place] [address] after presenting with symptoms of left hand numbness, clumsiness and weakness for several days. Patient reportedly fell and hit his head. Patient has a previous stroke and has chronic left-sided visual field deficits and difficulty with balance. Patient reportedly falls frequently. He also has a history of numbness and tingling chronically on the left leg due to a history of meningitis. CT of the Head was completed on arrival which showed an acute right parietal hemorrhage at the vertex. Neurosurgery and Neurology was consulted for further management. No neurosurgical intervention needed at this time. MRA was completed on this admission and there was noted an incidental finding of a 3 mm right A1-A2 aneurysm vs infundibulum. NIS was consulted for further evaluation. Also noted, was a right parotid mass in which otolaryngology was consulted. Patient is awake is doing well. He is alert and oriented this morning. He denies any headache or vision changes. He is sitting up in the chair. MRI to be completed today. Wife at bedside and informed me that their son passed last Septemeber from cancer and had an atypical neuroendocrine carcinoid lung tumor with mets to the Brain. Physical Exam:  Gen: NAD, calm, cooperative  Neuro: Alert and oriented x 4. Follows commands. Speech clear. Able to repeat sentences and name objects. Mild right facial asymmetry at rest. Affect normal. PERRL. EOMI. Left-sided peripheral visual field cut, otherwise visual fields intact. Significant hearing loss. Palate symmetric. Tongue midline. Brook spontaneously. LUE drift and contracted. 4/5 strength in LUE. 5/5 Strength in RUE, RLE, and LLE. No ataxia with FTN on right hand. Difficult to assess FTN on left hand due to weakness.  Incoordination noted with HTS bilaterally, but improved. Extinction noted when simultaneously touching both legs, otherwise no neglect. Sensation intact. Gait deferred. Skin: Ecchymosis noted on BUE. Plan:  Right Parietal Hemorrhage  - as noted on CT  - MRI of brain showed stable right parietal intraparenchymal hemorrhage with mild surrounding  edema, but no significant mass effect or midline shift. Noted punctate acute infarct in the left inferior parietal lobe. Chronic hemorrhages with associated encephalomalacia in the bilateralparieto-occipital lobes, right larger than left. Superficial siderosis from remote hemorrhage within the right parieto-occipital and temporal lobes. Marked generalized parenchymal volume loss and severe chronic microvascular ischemic disease. Small chronic infarcts in the bilateral cerebellum.  - MRA of Head and Neck showed no evidence of significant stenosis or vascular malformation. A 3 mm aneurysm versus infundibulum projecting medially at the right A1-A2 junction. Incompletely evaluated mass in the right parotid tail measuring 1.8 x 1.3 cm.  - MRI of Brain With Contrast completed today which showed a nonspecific mass in the right parotid gland.  Differential includes benign and malignant parotid lesions including a Warthin's tumor or benign mixed  tumor.  - Hgb A1C 6.5, management per primary team  - Lipid panel shows LDL 61.6, recommend high statin therapy when able to take PO given history of multiple strokes, however patient reported that he has itching with statins and myalgias, so will not order a statin secondary to this  - SBP goal < 140, Cardene, Hydralazine/Labetalol PRN  - on low dose Keppra 250 mg BID due to renal function for seizure ppx, no reported seizures  - no antiplatelets at this time due to [address]  - PT/OT/SLP evals  - ok with neuro checks every 4 hours  - Neurology following  - Neurosurgery following (no neurosurgical intervention needed)    Right Parotid Mass  - noted on MRA  - MRI of Orbits/Face and Neck WWO completed with results as stated above  - Otolaryngology consulted for further evaluation    Right A1-A2 junction unruptured cerebral aneurysm  - noted on MRA  - NIS consulted  - follow up with NIS outpatient upon discharge for imaging surveillance     Plan discussed with Dr. Saeid Graf (Neurology Attending), Intensivist NP Nora Otero RN, patient, and patient's wife. Ok to transfer to [facility] from a neuro standpoint. Irvin Flores NP  Neurocritical Care Nurse Practitioner  [phone]    Addendum 2000: Discussed with Dr. Saeid Graf on starting Lovenox on patient for DVT ppx. She would like to repeat a Head CT in AM and if CT stable can start lovenox tomorrow.  Will discuss with oncoming night shift Intensivist.

## 2020-08-09 NOTE — PROGRESS NOTES
[facility]    ICU TEAM Progress Note    Name: Melanie Mejía   : 1940   MRN: [id number]   Date: 2020      Assessment and ICU Plan of Care:     ICU Problems:  · Acute R parietal hemorrhage, MRI with stable right parietal intraparenchymal hemorrhage with surrounding edema but no mass-effect or midline shift. Also noted acute infarcts in left inferior parietal lobe as well as chronic hemorrhages with encephalomalacia of the bilateral parieto-occipital lobes right greater than left and superficial siderosis from remote hemorrhage within the right parieto-occipital and temporal lobes and small chronic infarctions bilateral cerebellum. · Neurology and NIS following  · MRI brain with contrast noted. Defer further repeat imaging to neurology unless mental status changes  · SBP < 140, may use Cardene as needed. Currently off. Norvasc added today with PRN hydralazine  · Neurochecks   · Keppra 250 mg BID for seizure ppx  · PT/OT/SLP to eval and treat  · Okay to transfer out of ICU from critical care standpoint. Discussed with Neurology who is in agreement  · Left MCI infarct, small  · No Antiplatelets with bleeding. Statin Listed on allergy list.   · Neurology following As above  · ECHO noted  · Cerebral aneurysm, right A1A2 junction, unruptured  · NIS following as above  · Will need outpatient follow-up  · Right parotid mass  · ENT consulted for further eval  · Consider ultrasound, defer to ENT  · TAMEKA, mild, likely prerenal  · Gentle IVF added with LR 75 ml/hr  · Follow Cr. Minimize nephrotoxins  · Hypertension  · Norvasc added as above.  PRN hydralazine  · Dementia  · Continue memantine and donepezil  · Diabetes  · Accu-Cheks and sliding scale insulin      Cardiac Gtts: None  SBP Goal of: < 140 mmHg  MAP Goal of: > 65 mmHg  Transfusion Trigger (Hgb): <7 g/dL    Respiratory Goals: Head of bed > 30 degrees  SPO2 Goal: > 92%  Pulmonary toilet: NA  DVT Prophylaxis (if no, list reason): SCD's or Sequential Compression Device     GI Prophylaxis: Pepcid (famotidine)   Nutrition: Yes   IVFs: LR added   Bowel Movement: Yes  Bowel Regimen: Docusate (Colace)    Ureña Catheter Present: No  Glycemic Control - Insulin: Yes  Antibiotics:None    Pain Medications: Acetaminophen  Target RASS: 0 - Alert & Calm - Spontaneously pays attention to caregiver  Sedation Medications: None  Mobility: Up with assistance  PT/OT: PT consulted and on board and OT consulted and on board   Restraints: None needed at this time  Discussed Plan of Care/Code Status: Full Code    T/L/D  Tubes: None  Lines: Peripheral IV  Drains: None    Subjective:   Progress Note: 8/9/2020      Reason for ICU Admission: Anne Arreguin is a 67 y.o. with a past medical history of dementia, diabetes, previous R parietal hemorrhagic stroke who presented to [facility] ED for L hand numbness that started on Tuesday. CT head revealed acute R parietal hemorrhage. Patient transferred to [facility] for neurosurgical evaluation.      On arrival, patient states that he has been having numbness in his left hand. He states that he did fall today and hit his head on the door. He denies losing consciousness and said \"it wasn't a bad fall. \" Per patient, he falls frequently and uses a cane. On exam, patient hard of hearing, alert and oriented x4. Patient with ataxia of left upper extremity and endorses numbness of left hand. Left upper extremity mildly weaker. 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 Gentle hydration added with LR at 75ml/hr. Encourage PO intake. Norvasc added today with BP creeping up 140s and 150s today. PRN hydralazine. Likely resume home Lisinopril when TAMEKA resolves. Ok to transfer out of ICU per Neurology.      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 XXXA  ICD-9-CM: K994.4               Past Medical History:      has a past medical history of Depression, Diabetes ([facility] Falls (4/26/2013), Hearing loss (4/26/2013), Hemorrhagic stroke ([facility] (02/11/2019), Memory loss (4/26/2013), Ringing in ears (4/26/2013), and Stroke ([facility] Past Surgical History:      has a past surgical history that includes neurological procedure unlisted and hx cholecystectomy. Home Medications:     Prior to Admission medications    Medication Sig Start Date End Date Taking? Authorizing Provider   memantine (Namenda) 5 mg tablet Take 1 Tab by mouth two (2) times a day. 6/11/20   Rolando Hurd MD   donepeziL (ARICEPT) 10 mg tablet TAKE ONE TABLET BY MOUTH DAILY  Patient taking differently: Take 5 mg by mouth nightly. 4/22/20   Vickey, Rolando Cm MD   lisinopril (PRINIVIL, ZESTRIL) 20 mg tablet Take  by mouth daily. Provider, Historical   insulin lispro (HUMALOG) 100 unit/mL injection 4 Units by SubCUTAneous route as needed.     Provider, Historical   insulin glargine (LANTUS) 100 unit/mL injection 11 Units by SubCUTAneous route nightly. Provider, Historical       Allergies/Social/Family History: Allergies   Allergen Reactions    Bactrim [Sulfamethoprim] Unknown (comments)    Dilantin [Phenytoin Sodium Extended] Unknown (comments)    Doxycycline Unknown (comments)    Lipitor [Atorvastatin] Myalgia    Pravastatin Myalgia    Sertraline Unknown (comments)    Sulfur Diarrhea    Tegretol [Carbamazepine] Unknown (comments)    Zocor [Simvastatin] Myalgia      Social History     Tobacco Use    Smoking status: Current Every Day Smoker    Smokeless tobacco: Never Used   Substance Use Topics    Alcohol use: No      Family History   Problem Relation Age of Onset    Dementia Maternal Grandfather     No Known Problems Mother     No Known Problems Father        Subjective:   Patient does report increased cough and increased sputum and some chest discomfort with the cough only. Denies any shortness of breath, wheezing, chest tightness. Denies any fever or chills. Objective:   Vital Signs:  Visit Vitals  /62 (BP 1 Location: Left arm, BP Patient Position: At rest)   Pulse 72   Temp 98 °F (36.7 °C)   Resp 13   Ht 5' 10\" (1.778 m)   Wt 77.8 kg (171 lb 8.3 oz)   SpO2 91%   BMI 24.61 kg/m²      O2 Device: Room air Temp (24hrs), Av.4 °F (36.9 °C), Min:98 °F (36.7 °C), Max:99.1 °F (37.3 °C)           Intake/Output:     Intake/Output Summary (Last 24 hours) at 2020 1310  Last data filed at 2020 1151  Gross per 24 hour   Intake 200 ml   Output    Net 200 ml       Physical Exam:    General:  Alert, cooperative, well noursished, well developed, appears stated age   Eyes:  Sclera anicteric. Pupils equally round and reactive to light. Mouth/Throat: Mucous membranes normal, oral pharynx clear   Neck: Supple   Lungs:   Clear to auscultation bilaterally, good effort   CV:  Regular rate and rhythm,no murmur, click, rub or gallop   Abdomen:   Soft, non-tender.  bowel sounds normal. non-distended   Extremities: No cyanosis or edema   Skin: Skin color, texture, turgor normal. no acute rash or lesions   Lymph nodes: Cervical and supraclavicular normal   Musculoskeletal: No swelling or deformity   Lines/Devices:  Intact, no erythema, drainage or tenderness   Psych/neuro: Alert and oriented, normal mood affect. Slight left upper extremity weakness       LABS AND  DATA: Personally reviewed  Recent Labs     08/09/20 0601 08/08/20 0428   WBC 8.2 7.2   HGB 13.1 12.6   HCT 40.5 39.1    172     Recent Labs     08/09/20 0601 08/08/20 0428    140   K 4.0 4.1   * 110*   CO2 25 24   BUN 27* 28*   CREA 1.36* 1.26   * 100   CA 8.5 8.6   MG 2.3 2.3   PHOS 3.7 3.4     No results for input(s): AP, TBIL, TP, ALB, GLOB, AML, LPSE in the last 72 hours. No lab exists for component: SGOT, GPT, AMYP  Recent Labs     08/09/20 0601 08/08/20 0428 08/07/20  0315   INR 1.1 1.1 1.1   PTP 10.9 10.9 11.0   APTT  --   --  30.9      No results for input(s): PHI, PCO2I, PO2I, FIO2I in the last 72 hours. No results for input(s): CPK, CKMB, TROIQ, BNPP in the last 72 hours. ABCDEF Bundle/Checklist Completed:  Yes    DISPOSITION  Ok to transfer out of ICU when ok with Neurosurgery. CRITICAL CARE CONSULTANT NOTE  I had a face to face encounter with the patient, reviewed and interpreted patient data including clinical events, labs, images, vital signs, I/O's, and examined patient. I have discussed the case and the plan and management of the patient's care with the consulting services, the bedside nurses and the respiratory therapist.      NOTE OF PERSONAL INVOLVEMENT IN CARE   This patient has a high probability of imminent, clinically significant deterioration, which requires the highest level of preparedness to intervene urgently.  I participated in the decision-making and personally managed or directed the management of the following life and organ supporting interventions that required my frequent assessment to treat or prevent imminent deterioration. I personally spent 35 minutes of critical care time. This is time spent at this critically ill patient's bedside actively involved in patient care as well as the coordination of care and discussions with the patient's family. This does not include any procedural time which has been billed separately.

## 2020-08-09 NOTE — PROGRESS NOTES
0730: Bedside shift change report given to Georgtete Casper RN (oncoming nurse) by Rekha Falcon RN (offgoing nurse). Report included the following information SBAR, Kardex, ED Summary, Procedure Summary, Intake/Output, MAR, Accordion, Recent Results and Dual Neuro Assessment. Primary Nurse Rm Mendez and Thelma Collier RN performed a dual skin assessment on this patient No impairment noted  Raji score is 16    1045: Patient back from MRI    1600: Patient with transfer orders to [facility].

## 2020-08-09 NOTE — PROGRESS NOTES
Neurology Progress Note  Millicent Whiteside NP    Admit Date: 8/6/2020   LOS: 3 days      Daily Progress Note: 8/9/2020    HPI: Anoop Palomares a 67 y. o. male with PMH of prior right parietal occipital hemorrhagic stroke with residual left visual field cut and some left sided weakness, DM2, memory loss and depression was transferred to [facility] ICU after presenting to the [facility] ER with symptoms of left hand numbness, clumsiness, and weakness for several days. Patient stated that he fell earlier today but hit the left side of his head. On ER record, his wife stated that since his previous stroke he has left-sided visual field deficits and difficulty with balance and falls frequently. Patient states he has a history of numbness and tingling in his left leg for 20 years due to history of meningitis. He had CT on arrival to [facility] ED and an acute right parietal hemorrhage was noted at the vertex. Dr. Will Corona in Neurosurgery was consulted who recommended admission to [facility] for further evaluation and was found to not be a surgical candidate. Since admission he has remained neurologically stable and repeat imaging has demonstrated stable hemorrhage as well as a tiny right A1-2 junction unruptured aneurysm on MRA as well as a parotid mass. He is scheduled for a contrasted MRI of head and neck for further evaluation of this mass. Subjective:     Denies numbness, tingling, chest pain, leg pain, nausea, vomiting, difficulty swallowing, headache, and dyspnea.      Allergies   Allergen Reactions    Bactrim [Sulfamethoprim] Unknown (comments)    Dilantin [Phenytoin Sodium Extended] Unknown (comments)    Doxycycline Unknown (comments)    Lipitor [Atorvastatin] Myalgia    Pravastatin Myalgia    Sertraline Unknown (comments)    Sulfur Diarrhea    Tegretol [Carbamazepine] Unknown (comments)    Zocor [Simvastatin] Myalgia       Review of Systems:  A comprehensive review of systems was negative except for that written in the History of Present Illness. Objective:   Vital signs  Temp (24hrs), Av.4 °F (36.9 °C), Min:98 °F (36.7 °C), Max:99.1 °F (37.3 °C)   No intake/output data recorded.  0701 -  1900  In: 520 [P.O.:120; I.V.:400]  Out: 550 [Urine:550]  Visit Vitals  /41   Pulse 61   Temp 98.4 °F (36.9 °C)   Resp 15   Ht 5' 10\" (1.778 m)   Wt 74.6 kg (164 lb 7.4 oz)   SpO2 90%   BMI 23.60 kg/m²      O2 Device: Room air   Vitals:    20 2100 20 2130 20 2200 20 2300   BP:  (!) 103/38 (!) 110/39 109/41   Pulse: 70 60 62 61   Resp: 26 18 23 15   Temp:       SpO2: 93% 93% 92% 90%   Weight:       Height:             Physical Exam:  GENERAL: Calm, cooperative, NAD  SKIN: Warm, dry, color appropriate for ethnicity     Neurologic Exam:  Mental Status:             Alert and oriented x 2-self, hospital, not situation or time. Appropriate affect, mood and behavior.        Language:                   Normal fluency, repetition not intact-phrase errors, object naming intact for 1/2     Cranial Nerves:           Pupils 3 mm, equal, round and reactive to light. Visual fields full to confrontation to right, left visual field cut, greater in inferior aspect                                      Extraocular movements intact. Facial sensation intact. Full facial strength, no asymmetry. Hearing grossly intact bilaterally to loud speech-hearing aid in place. No dysarthria. Tongue protrudes to midline, palate elevates symmetrically. Shoulder shrug 5/5 bilaterally.     Motor:                          No pronator drift. Bulk and tone normal.                                       5/5 power in all extremities throughout right, 4+ to LUE, 5/5 BLE.  Some left sided neglect more than true weakness                                        Sensation:                   Sensation intact throughout to light touch     Reflexes:                     Reflexes are 2+ at the biceps, triceps, patella and achilles bilaterally. Babinski's negative, no clonus     Coordination:               FTN  intact with no ataxia present to right, some difficulty to left    Labs:  Lab Results   Component Value Date/Time    WBC 7.2 08/08/2020 04:28 AM    HGB 12.6 08/08/2020 04:28 AM    HCT 39.1 08/08/2020 04:28 AM    PLATELET 514 45/72/1316 04:28 AM    MCV 98.0 08/08/2020 04:28 AM     Lab Results   Component Value Date/Time    Sodium 140 08/08/2020 04:28 AM    Potassium 4.1 08/08/2020 04:28 AM    Chloride 110 (H) 08/08/2020 04:28 AM    CO2 24 08/08/2020 04:28 AM    Anion gap 6 08/08/2020 04:28 AM    Glucose 100 08/08/2020 04:28 AM    BUN 28 (H) 08/08/2020 04:28 AM    Creatinine 1.26 08/08/2020 04:28 AM    BUN/Creatinine ratio 22 (H) 08/08/2020 04:28 AM    GFR est AA >60 08/08/2020 04:28 AM    GFR est non-AA 55 (L) 08/08/2020 04:28 AM    Calcium 8.6 08/08/2020 04:28 AM       Imaging:  CT Results (maximum last 3): Results from [place] encounter on 08/06/20   CT HEAD WO CONT    Narrative EXAM: CT HEAD WO CONT    INDICATION: evaluation of parietal hemorrhage    COMPARISON: None. CONTRAST: None. FINDINGS:  EXAM: CT HEAD WO CONT    INDICATION: left arm weakness and numbness    COMPARISON: 3/15/2019. CONTRAST: None. FINDINGS:  Extensive confluent periventricular hypodensity. Subacute partially layering right parietal hemorrhage at the vertex measuring  3.5 x 2.1 previously 2.4 x 3.6 cm. No intraventricular hemorrhage. The patient  is status post right occipital craniectomy cavernous carotid vascular  calcifications. Sulcal and ventricular prominence. IMPRESSION  Subacute right parietal hemorrhage is slightly diminished in size compared to  the prior examination.     Severe chronic microvascular ischemic change and moderate to severe temporal predominant cerebral atrophy. Atherosclerotic cerebral vasculopathy. Results from [place] encounter on 08/06/20   CT HEAD WO CONT    Narrative EXAM: CT HEAD WO CONT    INDICATION: left arm weakness and numbness    COMPARISON: 3/15/2019. CONTRAST: None. FINDINGS:  Enlarged compatible with the overall degree of volume loss. . Encephalomalacia  right hemisphere with low density in the periventricular white matter again  noted. Acute right parietal hemorrhage at the vertex measuring 2.4 x 3.6 x 3.4  cm. Estimated volume 14.7 cc The hemorrhage has a fluid fluid level in it. . No  mass effect or midline shift. . . The bone windows demonstrate post right occipital craniectomy. . The visualized  portions of the paranasal sinuses and mastoid air cells are clear. Impression IMPRESSION:   Acute right parietal hemorrhage at the vertex. The findings were called to Dr. Natalee Mclain on 8/6/2020 at [address] [place] [address] by myself. [phone]     Results from [place] encounter on 03/15/19   CT HEAD WO CONT    Narrative EXAM:  CT HEAD WO CONT  INDICATION:  Stroke; occipital ICH, nontraumatic cortical hemorrhage of right  cerebral hemisphere, I 61.1,  COMPARISON: None available. FINDINGS:  The patient's recent neurology office note describes history of right posterior  cerebral hematoma demonstrated on imaging while at [facility]  last month. There is an ill-defined area of relative decreased attenuation in the right  posterior parietal/occipital lobe measuring approximately 2.3 x 3.8 x 2.9 cm. Central heterogeneity may represent residual blood products. No high density demonstrated with in the brain to suggest acute hemorrhage. No abnormal extra-axial fluid collections.   Multifocal and confluent decreased attenuation in the cerebral white matter  suggesting chronic small vessel ischemic changes possibly progressed when  compared to an MRI 6/15/15. Again demonstrated is generalized prominence of the ventricles without evidence  of obstructive hydrocephalus and somewhat out of proportion to sulcal size also  similar to the MRI 6/15/15. Prior bilateral mastoidectomies. Structures of the skull base including  paranasal sinuses are otherwise unremarkable. Impression IMPRESSION:  1. Approximately 3 cm area of abnormal density right posterior parietal  occipital lobe is apparently related to a resolving parenchymal hematoma. 2. No acute intracranial hemorrhage. 3. Chronic generalized volume loss and white matter disease similar to prior  exams. Assessment:   Active Problems:    ICH (intracerebral hemorrhage) ([address] (8/6/2020)      Anterior communicating artery aneurysm (8/8/2020)      Plan: 1) Right Parietal Hemorrhage  - Hgb A1C 6.5, management per primary team-on SSI with minimal insulin requirements  - Lipid panel shows LDL 61.6, however does have hx of myalgias from statins in past therefore may not be an option and will need discussion of risk vs benefit   - SBP goal < 140, Cardene, Hydralazine/Labetalol PRN  - Keppra 250 mg BID due to renal function for seizure ppx  - No antiplatelets at this time due to [address], will consider DVT PPLX in AM with Dr. Rashida Ugarte  - PT/OT/SLP evals  - Neuro checks every hour  - Neurology following  - Neurosurgery following (no neurosurgical intervention needed)     2) Right Parotid Mass   -Noted on MRA  - MRI of Orbits/Face and Neck WWO pending  - Otolaryngology consulted for further evaluation     3) Right A1-A2 junction unruptured cerebral aneurysm  - noted on MRA  - NIS consulted: follow up with NIS outpatient upon discharge for imaging surveillance       Plan discussed with the patient and the primary RN. The patient verbalized understanding of the current plan of care and is in agreement.      Francisco De La Vega NP  Neurocritical Care Nurse Practitioner

## 2020-08-10 ENCOUNTER — APPOINTMENT (OUTPATIENT)
Dept: CT IMAGING | Age: 80
DRG: 064 | End: 2020-08-10
Attending: NURSE PRACTITIONER
Payer: MEDICARE

## 2020-08-10 ENCOUNTER — TELEPHONE (OUTPATIENT)
Dept: NEUROLOGY | Facility: CLINIC | Age: 80
End: 2020-08-10

## 2020-08-10 LAB
ATRIAL RATE: 45 BPM
CALCULATED R AXIS, ECG10: -6 DEGREES
CALCULATED T AXIS, ECG11: 19 DEGREES
DIAGNOSIS, 93000: NORMAL
GLUCOSE BLD STRIP.AUTO-MCNC: 109 MG/DL (ref 65–100)
GLUCOSE BLD STRIP.AUTO-MCNC: 120 MG/DL (ref 65–100)
GLUCOSE BLD STRIP.AUTO-MCNC: 161 MG/DL (ref 65–100)
MAGNESIUM SERPL-MCNC: 2.2 MG/DL (ref 1.6–2.4)
P-R INTERVAL, ECG05: 214 MS
PHOSPHATE SERPL-MCNC: 3.3 MG/DL (ref 2.6–4.7)
Q-T INTERVAL, ECG07: 464 MS
QRS DURATION, ECG06: 100 MS
QTC CALCULATION (BEZET), ECG08: 401 MS
SERVICE CMNT-IMP: ABNORMAL
VENTRICULAR RATE, ECG03: 45 BPM

## 2020-08-10 PROCEDURE — 97165 OT EVAL LOW COMPLEX 30 MIN: CPT

## 2020-08-10 PROCEDURE — 83735 ASSAY OF MAGNESIUM: CPT

## 2020-08-10 PROCEDURE — 97530 THERAPEUTIC ACTIVITIES: CPT

## 2020-08-10 PROCEDURE — 74011250636 HC RX REV CODE- 250/636: Performed by: NURSE PRACTITIONER

## 2020-08-10 PROCEDURE — 74011250637 HC RX REV CODE- 250/637: Performed by: NURSE PRACTITIONER

## 2020-08-10 PROCEDURE — 82962 GLUCOSE BLOOD TEST: CPT

## 2020-08-10 PROCEDURE — 84100 ASSAY OF PHOSPHORUS: CPT

## 2020-08-10 PROCEDURE — 99232 SBSQ HOSP IP/OBS MODERATE 35: CPT | Performed by: NURSE PRACTITIONER

## 2020-08-10 PROCEDURE — 93005 ELECTROCARDIOGRAM TRACING: CPT

## 2020-08-10 PROCEDURE — 74011000250 HC RX REV CODE- 250: Performed by: NURSE PRACTITIONER

## 2020-08-10 PROCEDURE — 74011000258 HC RX REV CODE- 258: Performed by: NURSE PRACTITIONER

## 2020-08-10 PROCEDURE — 65660000000 HC RM CCU STEPDOWN

## 2020-08-10 PROCEDURE — 70450 CT HEAD/BRAIN W/O DYE: CPT

## 2020-08-10 PROCEDURE — 74011636637 HC RX REV CODE- 636/637: Performed by: NURSE PRACTITIONER

## 2020-08-10 PROCEDURE — 36415 COLL VENOUS BLD VENIPUNCTURE: CPT

## 2020-08-10 PROCEDURE — 99222 1ST HOSP IP/OBS MODERATE 55: CPT | Performed by: SPECIALIST

## 2020-08-10 RX ADMIN — Medication 10 ML: at 06:00

## 2020-08-10 RX ADMIN — LEVETIRACETAM 250 MG: 100 INJECTION, SOLUTION INTRAVENOUS at 12:47

## 2020-08-10 RX ADMIN — LEVETIRACETAM 250 MG: 100 INJECTION, SOLUTION INTRAVENOUS at 00:54

## 2020-08-10 RX ADMIN — DONEPEZIL HYDROCHLORIDE 10 MG: 10 TABLET, FILM COATED ORAL at 22:16

## 2020-08-10 RX ADMIN — HYDROCORTISONE: 1 OINTMENT TOPICAL at 18:07

## 2020-08-10 RX ADMIN — SODIUM CHLORIDE, SODIUM LACTATE, POTASSIUM CHLORIDE, AND CALCIUM CHLORIDE 75 ML/HR: 600; 310; 30; 20 INJECTION, SOLUTION INTRAVENOUS at 01:00

## 2020-08-10 RX ADMIN — HYDROCORTISONE: 1 OINTMENT TOPICAL at 09:00

## 2020-08-10 RX ADMIN — MEMANTINE 5 MG: 5 TABLET ORAL at 18:06

## 2020-08-10 RX ADMIN — AMLODIPINE BESYLATE 5 MG: 5 TABLET ORAL at 09:33

## 2020-08-10 RX ADMIN — SODIUM CHLORIDE, SODIUM LACTATE, POTASSIUM CHLORIDE, AND CALCIUM CHLORIDE 75 ML/HR: 600; 310; 30; 20 INJECTION, SOLUTION INTRAVENOUS at 17:15

## 2020-08-10 RX ADMIN — Medication 10 ML: at 22:16

## 2020-08-10 RX ADMIN — FAMOTIDINE 20 MG: 10 INJECTION, SOLUTION INTRAVENOUS at 00:30

## 2020-08-10 RX ADMIN — INSULIN LISPRO 2 UNITS: 100 INJECTION, SOLUTION INTRAVENOUS; SUBCUTANEOUS at 12:52

## 2020-08-10 NOTE — PROGRESS NOTES
Hospitalist Progress Note  Mila Cullen MD  Answering service: [phone] from in house phone        Date of Service:  8/10/2020  NAME:  Lyn Walter  :  1940  MRN:  [id number]      Admission Summary:   As per initial admission summary  Brenda Obrien a 67 y. o. with a past medical history of dementia, diabetes, previous R parietal hemorrhagic stroke who presented to [facility] ED for L hand numbness that started on Tuesday. CT head revealed acute R parietal hemorrhage. Patient transferred to [facility] for neurosurgical evaluation.      On arrival, patient states that he has been having numbness in his left hand. He states that he did fall today and hit his head on the door. He denies losing consciousness and said \"it wasn't a bad fall. \" Per patient, he falls frequently and uses a cane. On exam, patient hard of hearing, alert and oriented x4. Patient with ataxia of left upper extremity and endorses numbness of left hand. Left upper extremity mildly weaker.  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 Also noted acute infarcts in left inferior parietal lobe as well as chronic hemorrhages with encephalomalacia of the bilateral parieto-occipital lobes right greater than left and superficial siderosis from remote hemorrhage within the right parieto-occipital and temporal lobes and small chronic infarctions bilateral cerebellum. · Neurology and NIS following  · MRI brain with contrast noted.  Defer further repeat imaging to neurology unless mental status changes  · SBP < 140, may use Cardene as needed. Currently off. Norvasc added  with PRN hydralazine  · Neurochecks   · Keppra 250 mg BID for seizure ppx  · PT/OT/SLP to eval and treat    · Left MCI infarct, small  ? No Antiplatelets with bleeding. Statin Listed on allergy list.   ? Neurology following As above  ? · Cerebral aneurysm, right A1A2 junction, unruptured  ? NIS following as above  ? Will need outpatient follow-up    · Right parotid mass  ? ENT consulted for further eval  ? Consider ultrasound, defer to ENT, consult pending   ? · TAMEKA, mild, likely prerenal  ? Gentle IVF added with LR 75 ml/hr  ? Follow Cr. Minimize nephrotoxins  ? · Hypertension  · Norvasc added as above. PRN hydralazine  · Dementia  · Continue memantine and donepezil  · Diabetes  · Accu-Cheks and sliding scale insulin  Code status: full   DVT prophylaxis: 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 Vital Signs:    Last 24hrs VS reviewed since prior progress note.  Most recent are:  Visit Vitals  BP (!) 128/39 (BP 1 Location: Left arm, BP Patient Position: At rest)   Pulse 60   Temp 98.2 °F (36.8 °C)   Resp 16   Ht 5' 10\" (1.778 m)   Wt 77.8 kg (171 lb 8.3 oz)   SpO2 98%   BMI 24.61 kg/m²         Intake/Output Summary (Last 24 hours) at 8/10/2020 1644  Last data filed at 8/10/2020 1200  Gross per 24 hour   Intake 1600 ml   Output    Net 1600 ml        Physical Examination:             Constitutional:  No acute distress, cooperative, pleasant    ENT:  Oral mucous moist, oropharynx benign. Neck supple,    Resp:  CTA bilaterally. No wheezing/rhonchi/rales. No accessory muscle use   CV:  Regular rhythm, normal rate, no murmurs, gallops, rubs    GI:  Soft, non distended, non tender. normoactive bowel sounds, no hepatosplenomegaly     Musculoskeletal:  No edema, warm, 2+ pulses throughout    Neurologic:  Moves all extremities. AAOx3, CN II-XII reviewed     Psych:  Good insight, Not anxious nor agitated. Skin:  Good turgor, no rashes or ulcers  Hematologic/Lymphatic/Immunlogic:  No jaundice nor lymph node swelling  Eyes:  EOMI. Anicteric sclerae, PERRL. Data Review:    Review and/or order of clinical lab test  Review and/or order of tests in the radiology section of CPT  Review and/or order of tests in the medicine section of CPT      Labs:     Recent Labs     08/09/20  0601 08/08/20 0428   WBC 8.2 7.2   HGB 13.1 12.6   HCT 40.5 39.1    172     Recent Labs     08/10/20  0525 08/09/20  0601 08/08/20 0428   NA  --  142 140   K  --  4.0 4.1   CL  --  110* 110*   CO2  --  25 24   BUN  --  27* 28*   CREA  --  1.36* 1.26   GLU  --  105* 100   CA  --  8.5 8.6   MG 2.2 2.3 2.3   PHOS 3.3 3.7 3.4     No results for input(s): ALT, AP, TBIL, TBILI, TP, ALB, GLOB, GGT, AML, LPSE in the last 72 hours. No lab exists for component: SGOT, GPT, AMYP, HLPSE  Recent Labs     08/09/20  0601 08/08/20 0428   INR 1.1 1.1   PTP 10.9 10.9      No results for input(s): FE, TIBC, PSAT, FERR in the last 72 hours. No results found for: FOL, RBCF   No results for input(s): PH, PCO2, PO2 in the last 72 hours. No results for input(s): CPK, CKNDX, TROIQ in the last 72 hours.     No lab exists for component: CPKMB  Lab Results   Component Value Date/Time    Cholesterol, total 106 08/07/2020 03:15 AM    HDL Cholesterol 27 08/07/2020 03:15 AM    LDL, calculated 61.6 08/07/2020 03:15 AM    Triglyceride 87 08/07/2020 03:15 AM    CHOL/HDL Ratio 3.9 08/07/2020 03:15 AM     Lab Results   Component Value Date/Time    Glucose (POC) 161 (H) 08/10/2020 12:49 PM    Glucose (POC) 120 (H) 08/10/2020 12:28 AM    Glucose (POC) 173 (H) 08/09/2020 05:41 PM    Glucose (POC) 114 (H) 08/09/2020 11:54 AM    Glucose (POC) 90 08/09/2020 06:05 AM     No results found for: COLOR, APPRN, SPGRU, REFSG, TAMMY, PROTU, GLUCU, KETU, BILU, UROU, NGHIA, LEUKU, GLUKE, EPSU, BACTU, WBCU, RBCU, CASTS, UCRY      Medications Reviewed:     Current Facility-Administered Medications   Medication Dose Route Frequency    lactated Ringers infusion  75 mL/hr IntraVENous CONTINUOUS    amLODIPine (NORVASC) tablet 5 mg  5 mg Oral DAILY    hydrALAZINE (APRESOLINE) 20 mg/mL injection 10 mg  10 mg IntraVENous Q6H PRN    hydrocortisone (HYCORT) 1 % ointment   Topical BID    sodium chloride (NS) flush 5-40 mL  5-40 mL IntraVENous Q8H    sodium chloride (NS) flush 5-40 mL  5-40 mL IntraVENous PRN    acetaminophen (TYLENOL) tablet 650 mg  650 mg Oral Q6H PRN    Or    acetaminophen (TYLENOL) suppository 650 mg  650 mg Rectal Q6H PRN    polyethylene glycol (MIRALAX) packet 17 g  17 g Oral DAILY PRN    promethazine (PHENERGAN) tablet 12.5 mg  12.5 mg Oral Q6H PRN    Or    ondansetron (ZOFRAN) injection 4 mg  4 mg IntraVENous Q6H PRN    famotidine (PF) (PEPCID) 20 mg in 0.9% sodium chloride 10 mL injection  20 mg IntraVENous Q24H    naloxone (NARCAN) injection 0.4 mg  0.4 mg IntraVENous PRN    insulin lispro (HUMALOG) injection   SubCUTAneous Q6H    glucose chewable tablet 16 g  4 Tab Oral PRN    glucagon (GLUCAGEN) injection 1 mg  1 mg IntraMUSCular PRN    dextrose 10% infusion 0-250 mL  0-250 mL IntraVENous PRN    memantine (NAMENDA) tablet 5 mg  5 mg Oral BID    donepeziL (ARICEPT) tablet 10 mg  10 mg Oral QHS    levETIRAcetam (KEPPRA) 250 mg in 0.9% sodium chloride 100 mL IVPB  250 mg IntraVENous Q12H     ______________________________________________________________________  EXPECTED LENGTH OF STAY: 2d 0h  ACTUAL LENGTH OF STAY: Johana Del Real, MD

## 2020-08-10 NOTE — PROGRESS NOTES
2330: Bedside shift change report given to Brigette Brumfield RN (oncoming nurse) by Bo Carey RN (offgoing nurse). Report included the following information SBAR, Intake/Output, MAR and Recent Results. 0730:

## 2020-08-10 NOTE — TELEPHONE ENCOUNTER
MRA of neck done 8/7. Has radiology report been received. Can leave message if Doron Monroy doesn't answer.

## 2020-08-10 NOTE — PROGRESS NOTES
[address] visit. Prayer and communion offered to couple. Mrs. Messer reports the plan is for Mr. Messer to go to a rehab before coming home. Will plan to follow-up on Wednesday if he is not discharged.     KYLIE Gómez, RN, ACSW, LCSW   Page:  [phone]

## 2020-08-10 NOTE — CONSULTS
Cardiology Consult Note      Patient Name: Melanie Mejía  : 1940 MRN: [id number]  Date: 8/10/2020  Time: 3:09 PM    Admit Diagnosis: ICH (intracerebral hemorrhage) ([facility] [address] [I61.9]    Primary Cardiologist: none   Consulting Cardiologist: Erica Chambers MD    Reason for Consult: bradycardia    Requesting MD: Dr. Apple Newton    HPI:  Melanie Mejía is a 67 y.o. male admitted on 2020  for ICH (intracerebral hemorrhage) ([address] [I61.9]. has a past medical history of Depression, Diabetes ([address] Falls (2013), Hearing loss (2013), Hemorrhagic stroke ([address] (2019), Memory loss (2013), Ringing in ears (2013), and Stroke ([address] Admitted for acute right parietal hemorrhage. Patient presented with left hand numbness. History as above, patient has asymptomatic bradycardia. Wife at the bedside to help with history. He denies having dizziness, lightheadedness or vision problems. He has a history for ICH, most recent admission to [address] [place] [address]. He and his wife deny any heart issues with regards to CHF, MI or CAD. No h/o HTN, HLD. Notes reviewed from [address] [place] [address] and office visits with Dr. Jose Guerrero. Longstanding bradycardia noted. Subjective:  No c/o CP or SOB. No dizziness or lightheadedness. Wife at the bedside      Assessment and Plan     1. Bradycardia   - Asymptomatic and chronic   - normal chronotropic response to exercise  20   ECHO ADULT COMPLETE 2020    Narrative · Image quality for this study was technically difficult. · AV: Mild aortic valve sclerosis with reduced excursion. Aortic valve   leaflet calcification present. · LV: Normal cavity size and systolic function (ejection fraction normal). Increased wall thickness. Estimated left ventricular ejection fraction is   50 - 55%. Wall motion: unable to assess due to limited image quality. Inconclusive left ventricular diastolic function. · PA:  Mild pulmonary hypertension. Pulmonary arterial systolic pressure is   41 mmHg. Signed by: Amee Pineda MD    - No on any AV nodals   - OK to continue with Namenda  2. ICH   - per NS, Neuro and primary team  3. Right A1-A2 junction unruptured cerebral aneurysm   - NIS recommending outpatient follow up  4. Dementia   - Continue memantine and donepezil    Asymptomatic bradycardia. Normal chronotropic response to activity. Echo normal as above. No indication for PPM at this time. Ok to resume Namenda. He needs no further cardiac testing at this time. Will see again as needed. Cardiology attending: seen and examined. Agree with assess and plan  No cardiac symptoms, though not particularily active. Chest clear, cv rrr no murmur, ext no edema. Reviewed records under media,  from [facility] 2/20 when treated for occipital bleed. Had bradycardia, and ekg identical to current tracing, as well as echo with similar findings to what was done here. Risk of preet with namenda less than 1%. Ok to proceed with surgery if necessary without special precautions or further cardiac testing. Patient Active Problem List   Diagnosis Code    Hearing loss H91.90    Ringing in ears H93.19    Joint pain M25.50    Falls [address]19. 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 Review of Systems:    [] Patient unable to provide secondary to condition    [x] All systems negative, except as checked below.   Constitutional:    []Weight Change  []Fever   []Chills   []Night Sweats  []Fatigue  []Malaise  []____  ENT/Mouth:     []Hearing Changes  []Ear Pain  []Nasal Congestion   []Sinus Pain []Hoarseness   []Sore throat  []Rhinorrhea  []Swallowing Difficulty  []____  Eyes:    []Eye Pain  []Swelling  []Redness  []Foreign Body  []Discharge  []Vision Changes  []____  Cardiovascular:    []Chest Pain  []SOB  []PND  [][place]  []Orthopnea  []Claudication  []Edema   []Palpitations  []____  Respiratory:    []Cough  []Sputum  []Wheezing,  []SOB  []Hemoptysis  []____  Gastrointestinal:    []Nausea  []Vomiting  []Diarrhea  []Constipation  []Pain  []Heartburn  []Anorexia  []Dysphagia  []Hematochezia  []Melena,  []Jaundice  []____  Genitourinary:    []Dysuria  []Urinary Frequency  []Hematuria  []Urinary Incontinence  []Urgency  []Flank Pain  []Hesitancy  []____  Musculoskeletal:    []Arthralgias  []Myalgias  []Joint Swelling  []Joint Stiffness  []Back Pain  []Neck Pain  []____  Skin:    []Skin Lesions  []Pruritis  []Hair Changes  []Skin rashes  []____  Neuro:    []Weakness  []Numbness  []Paresthesias  []Loss of Consciousness  []Syncope   []Dizziness  []Headache  []Coordination Changes  []Recent Falls  []____  Psych:    []Anxiety/Depression  []Insomnia  []Memory Changes  []Violence/Abuse Hx.  []____  Heme/Lymph:    []Bruising  []Bleeding  []Lymphadenopathy  []____  Endocrine:    []Polyuria  []Polydipsia  []Temperature Intolerance  []____         Previous treatment/evaluation includes   Echocardiogram - TTE/LOKESH  Cardiac risk factors:   male gender.     Past Medical History:   Diagnosis Date    Depression     [facility]     Falls 4/26/2013    Hearing loss 4/26/2013    Hemorrhagic stroke ([facility] 02/11/2019    Memory loss 4/26/2013    Ringing in ears 4/26/2013    Stroke [facility])      Past Surgical History:   Procedure Laterality Date    HX CHOLECYSTECTOMY      NEUROLOGICAL PROCEDURE UNLISTED       Current Facility-Administered Medications   Medication Dose Route Frequency    lactated Ringers infusion  75 mL/hr IntraVENous CONTINUOUS    amLODIPine (NORVASC) tablet 5 mg  5 mg Oral DAILY    hydrALAZINE (APRESOLINE) 20 mg/mL injection 10 mg  10 mg IntraVENous Q6H PRN    hydrocortisone (HYCORT) 1 % ointment   Topical BID    sodium chloride (NS) flush 5-40 mL  5-40 mL IntraVENous Q8H    sodium chloride (NS) flush 5-40 mL  5-40 mL IntraVENous PRN    acetaminophen (TYLENOL) tablet 650 mg  650 mg Oral Q6H PRN    Or    acetaminophen (TYLENOL) suppository 650 mg  650 mg Rectal Q6H PRN    polyethylene glycol (MIRALAX) packet 17 g  17 g Oral DAILY PRN    promethazine (PHENERGAN) tablet 12.5 mg  12.5 mg Oral Q6H PRN    Or    ondansetron (ZOFRAN) injection 4 mg  4 mg IntraVENous Q6H PRN    famotidine (PF) (PEPCID) 20 mg in 0.9% sodium chloride 10 mL injection  20 mg IntraVENous Q24H    naloxone (NARCAN) injection 0.4 mg  0.4 mg IntraVENous PRN    insulin lispro (HUMALOG) injection   SubCUTAneous Q6H    glucose chewable tablet 16 g  4 Tab Oral PRN    glucagon (GLUCAGEN) injection 1 mg  1 mg IntraMUSCular PRN    dextrose 10% infusion 0-250 mL  0-250 mL IntraVENous PRN    memantine (NAMENDA) tablet 5 mg  5 mg Oral BID    donepeziL (ARICEPT) tablet 10 mg  10 mg Oral QHS    levETIRAcetam (KEPPRA) 250 mg in 0.9% sodium chloride 100 mL IVPB  250 mg IntraVENous Q12H       Allergies   Allergen Reactions    Bactrim [Sulfamethoprim] Unknown (comments)    Dilantin [Phenytoin Sodium Extended] Unknown (comments)    Doxycycline Unknown (comments)    Lipitor [Atorvastatin] Myalgia    Pravastatin Myalgia    Sertraline Unknown (comments)    Sulfur Diarrhea    Tegretol [Carbamazepine] Unknown (comments)    Zocor [Simvastatin] Myalgia      Family History   Problem Relation Age of Onset    Dementia Maternal Grandfather     No Known Problems Mother     No Known Problems Father       Social History     Socioeconomic History    Marital status:      Spouse name: Not on file    Number of children: Not on file    Years of education: Not on file    Highest education level: Not on file   Tobacco Use    Smoking status: Current Every Day Smoker    Smokeless tobacco: Never Used   Substance and Sexual Activity    Alcohol use: No    Drug use: No       Objective:    Physical Exam    Vitals:   Vitals:    08/10/20 0800 08/10/20 1200 08/10/20 1321 08/10/20 1415   BP: 122/49 120/56 125/45 (!) 128/39   Pulse: (!) 47 (!) 56 (!) 54 60   Resp: 15 17 17 16   Temp:  97.9 °F (36.6 °C) 98 °F (36.7 °C) 98.2 °F (36.8 °C)   SpO2: 93% 98% 97% 98%   Weight:       Height:           General:    Alert, cooperative, no distress, appears stated age. Neck:   Supple, no carotid bruit and no JVD. Back:     Symmetric, normal curvature. Lungs:     clear to auscultation bilaterally. Heart[de-identified]    Regular rate and rhythm, S1, S2 normal, no murmur, click, rub or gallop. Abdomen:     Soft, non-tender. Bowel sounds normal.    Extremities:   Extremities normal, atraumatic, no cyanosis or edema. Vascular:   Pulses - 2+ radials   Skin:   Skin color normal. No rashes or lesions   Neurologic:   CN II-XII grossly intact.         Telemetry: normal sinus rhythm, bradycardia    ECG:   EKG Results     Procedure 720 Value Units Date/Time    EKG, 12 LEAD, INITIAL [[id number]] Collected:  08/10/20 1053    Order Status:  Completed Updated:  08/10/20 1054     Ventricular Rate 45 BPM      Atrial Rate 45 BPM      P-R Interval 214 ms      QRS Duration 100 ms      Q-T Interval 464 ms      QTC Calculation (Bezet) 401 ms      Calculated R Axis -6 degrees      Calculated T Axis 19 degrees      Diagnosis --     Marked sinus bradycardia with 1st degree AV block  Incomplete right bundle branch block  Septal infarct (cited on or before 06-AUG-2020)  When compared with ECG of 06-AUG-2020 20:36,  QT has shortened      EKG, 12 LEAD, INITIAL [[id number]] Collected:  08/06/20 2036    Order Status:  Completed Updated:  08/07/20 1117     Ventricular Rate 59 BPM      Atrial Rate 59 BPM      P-R Interval 200 ms      QRS Duration 114 ms      Q-T Interval 460 ms      QTC Calculation (Bezet) 455 ms Calculated P Axis -4 degrees      Calculated R Axis -2 degrees      Calculated T Axis 21 degrees      Diagnosis --     Sinus bradycardia with marked sinus arrhythmia  Incomplete right bundle branch block  Anteroseptal infarct , age undetermined  Abnormal ECG    Confirmed by Cal Rahman MD., Karyn Johnson ([id number]) on 8/7/2020 11:17:27 AM              Data Review:     Radiology:   XR Results (most recent):  Results from Hospital Encounter encounter on 08/06/20   XR CHEST PORT    Narrative EXAM:  XR CHEST PORT    INDICATION:  Cough, intracranial hemorrhage    COMPARISON:  None. FINDINGS:    A portable AP radiograph of the chest was obtained at 17:52 hours. The patient  is on a cardiac monitor. The lungs are clear. The cardiac and mediastinal  contours and pulmonary vascularity are normal.  The bones and soft tissues are  unremarkable. Impression IMPRESSION:     No acute process. No results for input(s): CPK, TROIQ in the last 72 hours. No lab exists for component: CKQMB, CPKMB, BMPP  Recent Labs     08/10/20  0525 08/09/20  0601 08/08/20  0428   NA  --  142 140   K  --  4.0 4.1   CL  --  110* 110*   CO2  --  25 24   BUN  --  27* 28*   CREA  --  1.36* 1.26   GLU  --  105* 100   PHOS 3.3 3.7 3.4   CA  --  8.5 8.6     Recent Labs     08/09/20  0601 08/08/20  0428   WBC 8.2 7.2   HGB 13.1 12.6   HCT 40.5 39.1    172     Recent Labs     08/09/20  0601 08/08/20  0428   PTP 10.9 10.9   INR 1.1 1.1     No results for input(s): CHOL, LDLC in the last 72 hours. No lab exists for component: TGL, HDLC,  HBA1C  No results for input(s): CRP, TSH, TSHEXT in the last 72 hours. No lab exists for component: ESR    Kacie Guadarrama. Jil Glass MD         [facility] [address] [place] [address] [address]     [facility]     [phone]    CC: Gisela Sanders MD

## 2020-08-10 NOTE — PROGRESS NOTES
Called consult for ENT, no answer, left message to call back for consult at this time. No call back as of yet. EKG obtained per request of dr. Brenda Tucker. Holding Namenda this morning until MD calls back and is ok with giving. Per Dr. Brenda Tucker, ok to hold namenda until later dose due to bradycardia. Will re evaluate and determine if discontinuing namenda will be appropriate.       Cardiology Consult called

## 2020-08-10 NOTE — PROGRESS NOTES
Neurology Progress Note    Patient ID:  Lita Marquez  [id number]  67 y.o.  1940    Chief Complaint:ICH    Subjective:   78-year-old gentleman with a new intracranial hemorrhage from a fall also with a small left MCA infarct. Doing well today. Nurses at the bedside. He denies HA, dizziness, vision changes. Objective:    All records in [facility] reviewed and noted    ROS:  Per HPI  All other 12 pt ROS negative    Meds:  Current Facility-Administered Medications   Medication Dose Route Frequency    lactated Ringers infusion  75 mL/hr IntraVENous CONTINUOUS    amLODIPine (NORVASC) tablet 5 mg  5 mg Oral DAILY    hydrALAZINE (APRESOLINE) 20 mg/mL injection 10 mg  10 mg IntraVENous Q6H PRN    hydrocortisone (HYCORT) 1 % ointment   Topical BID    sodium chloride (NS) flush 5-40 mL  5-40 mL IntraVENous Q8H    sodium chloride (NS) flush 5-40 mL  5-40 mL IntraVENous PRN    acetaminophen (TYLENOL) tablet 650 mg  650 mg Oral Q6H PRN    Or    acetaminophen (TYLENOL) suppository 650 mg  650 mg Rectal Q6H PRN    polyethylene glycol (MIRALAX) packet 17 g  17 g Oral DAILY PRN    promethazine (PHENERGAN) tablet 12.5 mg  12.5 mg Oral Q6H PRN    Or    ondansetron (ZOFRAN) injection 4 mg  4 mg IntraVENous Q6H PRN    famotidine (PF) (PEPCID) 20 mg in 0.9% sodium chloride 10 mL injection  20 mg IntraVENous Q24H    naloxone (NARCAN) injection 0.4 mg  0.4 mg IntraVENous PRN    insulin lispro (HUMALOG) injection   SubCUTAneous Q6H    glucose chewable tablet 16 g  4 Tab Oral PRN    glucagon (GLUCAGEN) injection 1 mg  1 mg IntraMUSCular PRN    dextrose 10% infusion 0-250 mL  0-250 mL IntraVENous PRN    memantine (NAMENDA) tablet 5 mg  5 mg Oral BID    donepeziL (ARICEPT) tablet 10 mg  10 mg Oral QHS    levETIRAcetam (KEPPRA) 250 mg in 0.9% sodium chloride 100 mL IVPB  250 mg IntraVENous Q12H       Imaging:  MRI Results (most recent):  Results from Hospital Encounter encounter on 08/06/20   MRI ORB FACE NECK W WO CONT    Narrative Comparison is made with an MRA of the neck performed on 8/7/2020    TECHNIQUE: Pre and postcontrast MRI of the head and neck was performed after the  administration of 15 mL of Dotarem. FINDINGS: Within the tail the right parotid gland, there is a lesion which  measures grossly 2.0 x 1.2 cm. The lesion demonstrates mixed heterogeneous T2  signal with heterogeneous enhancement on the postcontrast images. Incidental imaging of intracranial structures demonstrates prominent ventricles. Moderate white matter disease. Small amount of fluid in the nasopharynx. No  lymphadenopathy by size criteria. Intravascular flow voids are patent. 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 Differential includes  benign and malignant parotid lesions including a Warthin's tumor or benign mixed  tumor. Lymph node is thought less likely other incidental findings as above     CT Results (most recent):  Results from Hospital Encounter encounter on 08/06/20   CT HEAD WO CONT    Narrative EXAM: CT HEAD WO CONT    INDICATION: follow up ICH    COMPARISON: Head CT dated 8/7/2020. CONTRAST: None. TECHNIQUE: Unenhanced CT of the head was performed using 5 mm images. Brain and  bone windows were generated. Coronal and sagittal reformats. CT dose reduction  was achieved through use of a standardized protocol tailored for this  examination and automatic exposure control for dose modulation. FINDINGS:  Persistent but stable mild ventriculomegaly. . Encephalomalacia in the right  parietal lobe is unchanged. . Right parietal intraparenchymal hemorrhage measures  3.4 x 2.0 cm slightly decreased in size. Surrounding edema is not significantly  changed. . The basilar cisterns are open. No CT evidence of acute infarct. The bone windows demonstrate right occipital craniotomy changes. . The visualized  portions of the paranasal sinuses and mastoid air cells are clear.       Impression IMPRESSION: Stable to slight decreased size of right parietal intraparenchymal hemorrhage. Lab Review   Recent Results (from the past 24 hour(s))   GLUCOSE, POC    Collection Time: 08/09/20  5:41 PM   Result Value Ref Range    Glucose (POC) 173 (H) 65 - 100 mg/dL    Performed by [address] [place] [address], POC    Collection Time: 08/10/20 12:28 AM   Result Value Ref Range    Glucose (POC) 120 (H) 65 - 100 mg/dL    Performed by Rell Bear    MAGNESIUM    Collection Time: 08/10/20  5:25 AM   Result Value Ref Range    Magnesium 2.2 1.6 - 2.4 mg/dL   PHOSPHORUS    Collection Time: 08/10/20  5:25 AM   Result Value Ref Range    Phosphorus 3.3 2.6 - 4.7 MG/DL   EKG, 12 LEAD, INITIAL    Collection Time: 08/10/20 10:53 AM   Result Value Ref Range    Ventricular Rate 45 BPM    Atrial Rate 45 BPM    P-R Interval 214 ms    QRS Duration 100 ms    Q-T Interval 464 ms    QTC Calculation (Bezet) 401 ms    Calculated R Axis -6 degrees    Calculated T Axis 19 degrees    Diagnosis       Marked sinus bradycardia with 1st degree AV block  Incomplete right bundle branch block  Septal infarct (cited on or before 06-AUG-2020)  When compared with ECG of 06-AUG-2020 20:36,  QT has shortened     GLUCOSE, POC    Collection Time: 08/10/20 12:49 PM   Result Value Ref Range    Glucose (POC) 161 (H) 65 - 100 mg/dL    Performed by Suzanna Sagastume        Exam:  Visit Vitals  BP (!) 128/39 (BP 1 Location: Left arm, BP Patient Position: At rest)   Pulse 60   Temp 98.2 °F (36.8 °C)   Resp 16   Ht 5' 10\" (1.778 m)   Wt 77.8 kg (171 lb 8.3 oz)   SpO2 98%   BMI 24.61 kg/m²     Gen: Well developed  CV: RRR  Neuro: Alert to self, place, unable to tell the year or why he is here. no dysarthria or aphasia  CN II-XII: PERRL, EOMI,mild facial asymmetry; tongue/palate midline  Motor: LUE drift and contracted. 4/5 strength in LUE. 5/5 Strength in RUE, RLE, and LLE. Sensory: intact to LT  Gait:deferred: FTN left side clumsy  due to weakness    Assessment: Patient Active Problem List   Diagnosis Code    Hearing loss H91.90    Ringing in ears H93.19    Joint pain M25.50    Falls W19. 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 A 3 mm aneurysm versus infundibulum projecting medially at the right A1-A2 junction. Incompletely evaluated mass in the right parotid tail measuring 1.8 x 1.3 cm.  - MRI of Brain Wwth Contras  showed a nonspecific mass in the right parotid gland. Differential includes benign and malignant parotid lesions including a Warthin's tumor or benign mixed Tumor. Otolaryngology on board   - Hgb A1C 6.5, management per primary team  - Lipid panel shows LDL 61.6, recommend high statin therapy when able to take PO given history of multiple strokes, however patient reported that he has itching with statins and myalgias, so will not order a statin secondary to this  - SBP goal < 140,  - Continue keppra 250 mg BID for seizure ppx, no reported seizures  - no antiplatelets at this time due to [id number]  in 2 weeks   - PT/OT/SLP evals  - ok with neuro checks every 4 hours     2. )Right A1-A2 junction unruptured cerebral aneurysm  -  MRA of Head and Neck showed no evidence of significant stenosis or vascular malformation. A 3 mm aneurysm versus infundibulum projecting medially at the right A1-A2 junction.  Incompletely evaluated mass in the right parotid tail measuring 1.8 x 1.3 cm.  -  NIS recommend outpatient follow up at  discharge for imaging surveillance  Thank you very much for this consultation. No further neurologic recommendations at this time. Will sign off but please call with questions.     Signed:  Abundio Chan NP  8/10/2020  2:41 PM

## 2020-08-10 NOTE — PROGRESS NOTES
Problem: Falls - Risk of  Goal: *Absence of Falls  Description: Document [facility] Fall Risk and appropriate interventions in the flowsheet.   Outcome: Progressing Towards Goal  Note: Fall Risk Interventions:  Mobility Interventions: PT Consult for mobility concerns, PT Consult for assist device competence    Mentation Interventions: Bed/chair exit alarm    Medication Interventions: Bed/chair exit alarm    Elimination Interventions: Call light in reach    History of Falls Interventions: Bed/chair exit alarm

## 2020-08-10 NOTE — PROGRESS NOTES
Problem: Self Care Deficits Care Plan (Adult)  Goal: *Acute Goals and Plan of Care (Insert Text)  Description:   FUNCTIONAL STATUS PRIOR TO ADMISSION:  Patient's wife present to confirm PLOF. Patient was performing ADL and functional mobility at supervision to modified independent level/ ambulatory using SPC, but with ~5 falls in within last 6 weeks. Patient was standing to to shower with supervision. Staying on 1st floor of house. Patient was receiving OP PT services for balance training. Patient's wife and son were unable to help him recover off the floor after a recent fall. HOME SUPPORT: Patient lived with his wife and adult son. Patient's wife and son performed household IADLs. Occupational Therapy Goals  Initiated 8/10/2020  1. Patient will perform grooming standing at sink with contact guard assistance within 7 day(s). 2.  Patient will perform bathing with supervision/set-up within 7 day(s). 3.  Patient will perform lower body dressing with contact guard assist within 7 day(s). 4.  Patient will perform toilet transfers with contact guard assist within 7 day(s). 5.  Patient will perform all aspects of toileting with contact guard assist within 7 day(s). 6.  Patient will participate in upper extremity therapeutic exercise/activities with supervision/set-up for 10 minutes within 7 day(s). 7.  Patient will utilize energy conservation techniques during functional activities with verbal cues within 7 day(s).     Outcome: Not Met    OCCUPATIONAL THERAPY EVALUATION  Patient: Matthew Yu (44 y.o. male)  Date: 8/10/2020  Primary Diagnosis: ICH [facility]intracerebral hemorrhage) ([facility] [I61.9]        Precautions: Fall    ASSESSMENT  Based on the objective data described below, the patient presents with moderate L personal neglect, impaired detection of L visual stimuli, significant LUE dysmetria, impaired L vs R discrimination, impaired standing balance, and impaired insight into deficits s/p admission for R parietal ICH. Patient also [address] and followed most simple commands with hearing aide in, but had difficulty with mult-step commands. Noted patient with history of right parietal occipital hemorrhagic stroke with residual L visual deficit per chart review. Patient also reports L-sided deficits at baseline d/t meningitis, but his wife confirmed current LUE impairment is acute. Patient's wife present to confirm PLOF. PTA, patient was performing ADL and functional mobility at supervision to modified independent level/ ambulatory using SPC, but with ~5 falls in within last 6 weeks. In OT evaluation today, patient required repeated multimodal cuing to perform requested movements with LUE, strong preference to use RUE even though he is LUE dominant at baseline. He demonstrated intact LUE AROM and strength once full motor performance was facilitated but significant dysmetria and poor fine motor coordination. He performed sit-stand with CGA and required minimum/ steadying assistance for ambulating ~6 feet within room. Patient with history of multiple recent falls and is now at increased risk for additional falls. Patient's wife expressed that she and her son are unable to provide adequate assistance and were unable to help patient recover from the floor after a recent fall at home. Patient is below baseline and would benefit from inpatient rehab to maximize functional/ neurological recovery. Current Level of Function Impacting Discharge (ADLs/self-care): minimum to moderate assistance UB ADLs, contact guard sit-stand, minimum assistance ambulating ~6 ft    Functional Outcome Measure: Unable to perform Robert Spray UE motor assessment d/t impaired multi-step command following. Scored 30/100 on the Barthel Index, indicating ~70% impairment in functional performance    Patient will benefit from skilled therapy intervention to address the above noted impairments.        PLAN :  Recommendations and Planned Interventions: self care training, functional mobility training, therapeutic exercise, balance training, visual/perceptual training, therapeutic activities, cognitive retraining, endurance activities, neuromuscular re-education, patient education, home safety training, and family training/education    Frequency/Duration: Patient will be followed by occupational therapy 5 times a week to address goals. Recommendation for discharge: (in order for the patient to meet his/her long term goals)  Therapy 3 hours per day 5-7 days per week    This discharge recommendation:  Has not yet been discussed the attending provider and/or case management       SUBJECTIVE:   Patient stated I feel fine.     OBJECTIVE DATA SUMMARY:   HISTORY:   Past Medical History:   Diagnosis Date    Depression     Diabetes ([facility]     Falls 4/26/2013    Hearing loss 4/26/2013    Hemorrhagic stroke ([facility] 02/11/2019    Memory loss 4/26/2013    Ringing in ears 4/26/2013    Stroke [facility])      Past Surgical History:   Procedure Laterality Date    HX CHOLECYSTECTOMY      NEUROLOGICAL PROCEDURE UNLISTED       Expanded or extensive additional review of patient history:     Home Situation  Home Environment: Private residence  # Steps to Enter: 4  Rails to Enter: Yes  Hand Rails : Bilateral  One/Two Story Residence: Two story, live on 1st floor  Living Alone: No(with wife and adult son)  Support Systems: Child(alessandra), Spouse/Significant Other/Partner  Current DME Used/Available at Home: Donny Beath, rolling, Cane, straight, Grab bars  Tub or Shower Type: Shower    Hand dominance: Left    EXAMINATION OF PERFORMANCE DEFICITS:  Cognitive/Behavioral Status:  Neurologic State: Alert  Orientation Level: Oriented to person;Oriented to place;Oriented to situation;Disoriented to time  Cognition: Follows commands  Perception: Cues to attend left visual field;Cues to attend to left side of body  Perseveration: No perseveration noted  Safety/Judgement: Decreased awareness of need for assistance;Decreased awareness of need for safety;Decreased insight into deficits    Skin: visible skin appears intact    Edema: none noted    Hearing:   [place]    Vision/Perceptual:    Tracking: Able to track stimulus in all quadrants w/o difficulty              Visual Fields: (detected in all fields, requiring eye shifts/ head turs)  Diplopia: No              Range of Motion:    AROM: Within functional limits                         Strength:    Strength: Within functional limits                Coordination:  Coordination: (RUE WFL, LUE significant dysmetria)  Fine Motor Skills-Upper: Left Impaired;Right Impaired    Gross Motor Skills-Upper: Left Impaired;Right Impaired    Tone & Sensation:    Tone: Normal  Sensation: Intact                      Balance:  Sitting: Intact  Standing: Impaired  Standing - Static: Good  Standing - Dynamic : Fair    Functional Mobility and Transfers for ADLs:  Bed Mobility:       Transfers:  Sit to Stand: Contact guard assistance  Stand to Sit: Minimum assistance(for positioning/ controlled descent)  Bed to Chair: Minimum assistance    ADL Assessment:  Feeding: Minimum assistance(inferred)    Oral Facial Hygiene/Grooming: Minimum assistance;Assist x2(inferred d/t impaired coordination)    Bathing: Moderate assistance(inferred d/t impaired coordination, inattention, balance)    Upper Body Dressing: Minimum assistance(inferred d/t L inattention, coordination)    Lower Body Dressing: Moderate assistance(inferred d/t L inattention, + impaired balance, coordina)    Toileting:  Moderate assistance(inferred)                ADL Intervention and task modifications:                                     Cognitive Retraining  Safety/Judgement: Decreased awareness of need for assistance;Decreased awareness of need for safety;Decreased insight into deficits      Functional Measure:  Barthel Index:    Bathin  Bladder: 0  Bowels: 5  Groomin  Dressin  Feedin  Mobility: 0  Stairs: 0  Toilet Use: 5  Transfer (Bed to Chair and Back): 10  Total: 30/100        The Barthel ADL Index: Guidelines  1. The index should be used as a record of what a patient does, not as a record of what a patient could do. 2. The main aim is to establish degree of independence from any help, physical or verbal, however minor and for whatever reason. 3. The need for supervision renders the patient not independent. 4. A patient's performance should be established using the best available evidence. Asking the patient, friends/relatives and nurses are the usual sources, but direct observation and common sense are also important. However direct testing is not needed. 5. Usually the patient's performance over the preceding 24-48 hours is important, but occasionally longer periods will be relevant. 6. Middle categories imply that the patient supplies over 50 per cent of the effort. 7. Use of aids to be independent is allowed. Eran Durham., Barthel, D.W. ([id number]). Functional evaluation: the Barthel Index. [address] [facility] (14)2. Juancho Bermudez tina AMY Michele, Roxy Gibbs., [place]., [place], [address] [facility] (1999). Measuring the change indisability after inpatient rehabilitation; comparison of the responsiveness of the Barthel Index and Functional [place] Measure. Journal of Neurology, Neurosurgery, and Psychiatry, 66(4), 793-491. INÉS Mukherjee, MITCHELL Castaneda, & Dong Fowler, MAdeolaA. (2004.) Assessment of post-stroke quality of life in cost-effectiveness studies: The usefulness of the Barthel Index and the EuroQoL-5D.  Quality of Life Research, 15, 224-18           Occupational Therapy Evaluation Charge Determination   History Examination Decision-Making   LOW Complexity : Brief history review  MEDIUM Complexity : 3-5 performance deficits relating to physical, cognitive , or psychosocial skils that result in activity limitations and / or participation restrictions MEDIUM Complexity : Patient may present with comorbidities that affect occupational performnce. Miniml to moderate modification of tasks or assistance (eg, physical or verbal ) with assesment(s) is necessary to enable patient to complete evaluation       Based on the above components, the patient evaluation is determined to be of the following complexity level: LOW   Pain Rating:  Patient did not report pain    Activity Tolerance:   Good, VSS    After treatment patient left in no apparent distress:    Supine in bed, being transported to new unit    COMMUNICATION/EDUCATION:   The patients plan of care was discussed with: Registered nurse. Patient was educated regarding his deficit(s) of L impaired coordination and attention as this relates to his diagnosis of CVA. He demonstrated Fair understanding as evidenced by verbalization. Patient and/or family was verbally educated on the BE FAST acronym for signs/symptoms of CVA and TIA. All questions answered with patient indicating fair understanding. Home safety education was provided and the patient/caregiver indicated understanding., Patient/family have participated as able in goal setting and plan of care. , and Patient/family agree to work toward stated goals and plan of care. This patients plan of care is appropriate for delegation to [facility] [place].     Thank you for this referral.  Jesús Pina OT  Time Calculation: 49 mins

## 2020-08-11 LAB
ANION GAP SERPL CALC-SCNC: 8 MMOL/L (ref 5–15)
BASOPHILS # BLD: 0 K/UL (ref 0–0.1)
BASOPHILS NFR BLD: 0 % (ref 0–1)
BUN SERPL-MCNC: 25 MG/DL (ref 6–20)
BUN/CREAT SERPL: 19 (ref 12–20)
CALCIUM SERPL-MCNC: 8.2 MG/DL (ref 8.5–10.1)
CHLORIDE SERPL-SCNC: 111 MMOL/L (ref 97–108)
CO2 SERPL-SCNC: 24 MMOL/L (ref 21–32)
CREAT SERPL-MCNC: 1.31 MG/DL (ref 0.7–1.3)
DIFFERENTIAL METHOD BLD: ABNORMAL
EOSINOPHIL # BLD: 0.4 K/UL (ref 0–0.4)
EOSINOPHIL NFR BLD: 6 % (ref 0–7)
ERYTHROCYTE [DISTWIDTH] IN BLOOD BY AUTOMATED COUNT: 12.1 % (ref 11.5–14.5)
GLUCOSE BLD STRIP.AUTO-MCNC: 109 MG/DL (ref 65–100)
GLUCOSE BLD STRIP.AUTO-MCNC: 120 MG/DL (ref 65–100)
GLUCOSE BLD STRIP.AUTO-MCNC: 125 MG/DL (ref 65–100)
GLUCOSE BLD STRIP.AUTO-MCNC: 156 MG/DL (ref 65–100)
GLUCOSE BLD STRIP.AUTO-MCNC: 171 MG/DL (ref 65–100)
GLUCOSE BLD STRIP.AUTO-MCNC: 178 MG/DL (ref 65–100)
GLUCOSE BLD STRIP.AUTO-MCNC: 91 MG/DL (ref 65–100)
GLUCOSE SERPL-MCNC: 105 MG/DL (ref 65–100)
HCT VFR BLD AUTO: 36.9 % (ref 36.6–50.3)
HGB BLD-MCNC: 12.1 G/DL (ref 12.1–17)
IMM GRANULOCYTES # BLD AUTO: 0 K/UL (ref 0–0.04)
IMM GRANULOCYTES NFR BLD AUTO: 0 % (ref 0–0.5)
INR PPP: 1.1 (ref 0.9–1.1)
LYMPHOCYTES # BLD: 1.6 K/UL (ref 0.8–3.5)
LYMPHOCYTES NFR BLD: 23 % (ref 12–49)
MAGNESIUM SERPL-MCNC: 2.1 MG/DL (ref 1.6–2.4)
MCH RBC QN AUTO: 31.8 PG (ref 26–34)
MCHC RBC AUTO-ENTMCNC: 32.8 G/DL (ref 30–36.5)
MCV RBC AUTO: 97.1 FL (ref 80–99)
MONOCYTES # BLD: 0.6 K/UL (ref 0–1)
MONOCYTES NFR BLD: 9 % (ref 5–13)
NEUTS SEG # BLD: 4.4 K/UL (ref 1.8–8)
NEUTS SEG NFR BLD: 62 % (ref 32–75)
NRBC # BLD: 0 K/UL (ref 0–0.01)
NRBC BLD-RTO: 0 PER 100 WBC
PHOSPHATE SERPL-MCNC: 3.6 MG/DL (ref 2.6–4.7)
PLATELET # BLD AUTO: 167 K/UL (ref 150–400)
PMV BLD AUTO: 9.4 FL (ref 8.9–12.9)
POTASSIUM SERPL-SCNC: 4.2 MMOL/L (ref 3.5–5.1)
PROTHROMBIN TIME: 10.9 SEC (ref 9–11.1)
RBC # BLD AUTO: 3.8 M/UL (ref 4.1–5.7)
SERVICE CMNT-IMP: ABNORMAL
SERVICE CMNT-IMP: NORMAL
SODIUM SERPL-SCNC: 143 MMOL/L (ref 136–145)
WBC # BLD AUTO: 7.1 K/UL (ref 4.1–11.1)

## 2020-08-11 PROCEDURE — 97535 SELF CARE MNGMENT TRAINING: CPT

## 2020-08-11 PROCEDURE — 97530 THERAPEUTIC ACTIVITIES: CPT

## 2020-08-11 PROCEDURE — 74011250636 HC RX REV CODE- 250/636: Performed by: NURSE PRACTITIONER

## 2020-08-11 PROCEDURE — 74011000258 HC RX REV CODE- 258: Performed by: NURSE PRACTITIONER

## 2020-08-11 PROCEDURE — 74011636637 HC RX REV CODE- 636/637: Performed by: NURSE PRACTITIONER

## 2020-08-11 PROCEDURE — 74011000250 HC RX REV CODE- 250: Performed by: NURSE PRACTITIONER

## 2020-08-11 PROCEDURE — 83735 ASSAY OF MAGNESIUM: CPT

## 2020-08-11 PROCEDURE — 97116 GAIT TRAINING THERAPY: CPT

## 2020-08-11 PROCEDURE — 65660000000 HC RM CCU STEPDOWN

## 2020-08-11 PROCEDURE — 80048 BASIC METABOLIC PNL TOTAL CA: CPT

## 2020-08-11 PROCEDURE — 84100 ASSAY OF PHOSPHORUS: CPT

## 2020-08-11 PROCEDURE — 74011250637 HC RX REV CODE- 250/637: Performed by: NURSE PRACTITIONER

## 2020-08-11 PROCEDURE — 85610 PROTHROMBIN TIME: CPT

## 2020-08-11 PROCEDURE — 82962 GLUCOSE BLOOD TEST: CPT

## 2020-08-11 PROCEDURE — 93005 ELECTROCARDIOGRAM TRACING: CPT

## 2020-08-11 PROCEDURE — 85025 COMPLETE CBC W/AUTO DIFF WBC: CPT

## 2020-08-11 PROCEDURE — 36415 COLL VENOUS BLD VENIPUNCTURE: CPT

## 2020-08-11 RX ADMIN — MEMANTINE 5 MG: 5 TABLET ORAL at 18:48

## 2020-08-11 RX ADMIN — LEVETIRACETAM 250 MG: 100 INJECTION, SOLUTION INTRAVENOUS at 00:27

## 2020-08-11 RX ADMIN — INSULIN LISPRO 2 UNITS: 100 INJECTION, SOLUTION INTRAVENOUS; SUBCUTANEOUS at 18:48

## 2020-08-11 RX ADMIN — INSULIN LISPRO 2 UNITS: 100 INJECTION, SOLUTION INTRAVENOUS; SUBCUTANEOUS at 11:57

## 2020-08-11 RX ADMIN — HYDROCORTISONE: 1 OINTMENT TOPICAL at 18:48

## 2020-08-11 RX ADMIN — Medication 10 ML: at 14:35

## 2020-08-11 RX ADMIN — SODIUM CHLORIDE, SODIUM LACTATE, POTASSIUM CHLORIDE, AND CALCIUM CHLORIDE 75 ML/HR: 600; 310; 30; 20 INJECTION, SOLUTION INTRAVENOUS at 05:58

## 2020-08-11 RX ADMIN — AMLODIPINE BESYLATE 5 MG: 5 TABLET ORAL at 09:55

## 2020-08-11 RX ADMIN — HYDROCORTISONE: 1 OINTMENT TOPICAL at 09:55

## 2020-08-11 RX ADMIN — MEMANTINE 5 MG: 5 TABLET ORAL at 09:55

## 2020-08-11 RX ADMIN — FAMOTIDINE 20 MG: 10 INJECTION, SOLUTION INTRAVENOUS at 00:27

## 2020-08-11 RX ADMIN — LEVETIRACETAM 250 MG: 100 INJECTION, SOLUTION INTRAVENOUS at 11:57

## 2020-08-11 RX ADMIN — DONEPEZIL HYDROCHLORIDE 10 MG: 10 TABLET, FILM COATED ORAL at 22:59

## 2020-08-11 NOTE — PROGRESS NOTES
Problem: Self Care Deficits Care Plan (Adult)  Goal: *Acute Goals and Plan of Care (Insert Text)  Description:   FUNCTIONAL STATUS PRIOR TO ADMISSION:  Patient's wife present to confirm PLOF. Patient was performing ADL and functional mobility at supervision to modified independent level/ ambulatory using SPC, but with ~5 falls in within last 6 weeks. Patient was standing to to shower with supervision. Staying on 1st floor of house. Patient was receiving OP PT services for balance training. Patient's wife and son were unable to help him recover off the floor after a recent fall. HOME SUPPORT: Patient lived with his wife and adult son. Patient's wife and son performed household IADLs. Occupational Therapy Goals  Initiated 8/10/2020  1. Patient will perform grooming standing at sink with contact guard assistance within 7 day(s). 2.  Patient will perform bathing with supervision/set-up within 7 day(s). 3.  Patient will perform lower body dressing with contact guard assist within 7 day(s). 4.  Patient will perform toilet transfers with contact guard assist within 7 day(s). 5.  Patient will perform all aspects of toileting with contact guard assist within 7 day(s). 6.  Patient will participate in upper extremity therapeutic exercise/activities with supervision/set-up for 10 minutes within 7 day(s). 7.  Patient will utilize energy conservation techniques during functional activities with verbal cues within 7 day(s). Outcome: Progressing Towards Goal    OCCUPATIONAL THERAPY TREATMENT  Patient: Matthew Yu (74 y.o. male)  Date: 8/11/2020  Diagnosis: [facility] [I61.9]   <principal problem not specified>       Precautions: Fall  Chart, occupational therapy assessment, plan of care, and goals were reviewed.     ASSESSMENT  Patient is progressing in OT goals but remains limited by moderate L personal neglect, impaired detection of L visual stimuli/ environment, acute LUE ataxia, impaired standing balance, impaired insight into deficits, and impaired safety awareness s/p admission for R parietal ICH. Patient with improving initiation of LUE for functional tasks but remains limited by coordination and still required cues to integrate LUE into tasks, to locate items in L enviroment, and to attend to L side of body. Patient is LUE dominant at baseline. Patient donned R sock, then attempted to 2nd sock on R foot twice, requiring cuing to attend to LLE for L sock. Required up to moderate assistance ambulating to/ within bathroom using [facility] with multiple LOB. Patient with history of multiple recent falls at home and is now at increased risk for additional falls. Patient's wife expressed that she and her son are unable to provide adequate assistance and were unable to help patient recover from the floor after a recent fall at home. Patient is below baseline and would benefit from inpatient rehab to maximize functional/ neurological recovery. Current Level of Function Impacting Discharge (ADLs/self-care): minimum to moderate assistance UB ADLs, contact guard sit-stand, constant steadying + up to moderate assistance ambulating to/ from bathroom using [facility]         PLAN :  Patient continues to benefit from skilled intervention to address the above impairments. Continue treatment per established plan of care. to address goals. Recommendation for discharge: (in order for the patient to meet his/her long term goals)  Therapy 3 hours per day 5-7 days per week    This discharge recommendation:  Has been made in collaboration with the attending provider and/or case management       SUBJECTIVE:   Patient stated I want to be able to walk better.     OBJECTIVE DATA SUMMARY:   Cognitive/Behavioral Status:  Neurologic State: Alert  Orientation Level: Oriented to person;Oriented to place;Oriented to situation;Disoriented to time  Cognition: Follows commands             Functional Mobility and Transfers for ADLs:  Bed Mobility:  Supine to Sit: Supervision;Bed Modified(HOB elevated)  Sit to Supine: (remained OOB in chair)    Transfers:  Sit to Stand: Contact guard assistance(Simultaneous filing. User may not have seen previous data.)  Functional Transfers  Toilet Transfer : Minimum assistance  Bed to Chair: Minimum assistance    Balance:  Sitting: Intact(Simultaneous filing. User may not have seen previous data.)  Sitting - Static: Good (unsupported)  Sitting - Dynamic: Fair (occasional)  Standing: Impaired(Simultaneous filing. User may not have seen previous data.)  Standing - Static: Fair(Simultaneous filing. User may not have seen previous data.)  Standing - Dynamic : Fair(Simultaneous filing. User may not have seen previous data.)    ADL Intervention:       Grooming  Washing Hands: Minimum assistance(constant steadying, standing at sink)       Lower Body Dressing Assistance  Socks: Moderate assistance(doffed in tailor sit, assistance to don d/t LUE ataxia)  Shoes with Cloth Laces: Moderate assistance(donned in tailor sit + assistance to tie)    Toileting  Bowel Hygiene: Minimum assistance(used R hand seated/ standing)       Pain:  Patient did not repot pain    Activity Tolerance:   VSS, good    After treatment patient left in no apparent distress:   Sitting in chair, Call bell within reach and Caregiver / family present, RN notified    COMMUNICATION/COLLABORATION:   The patients plan of care was discussed with: Physical therapist and Registered nurse. Patient was educated regarding His deficit(s) of LUE ataxia and neglect as this relates to His diagnosis of CVA. He demonstrated Good understanding as evidenced by verbalization    Patient and/or family was verbally educated on the BE FAST acronym for signs/symptoms of CVA and TIA. Provided with BEFAST handout. All questions answered with patient indicating good understanding.      Stevo Arguelles OT  Time Calculation: 42 mins

## 2020-08-11 NOTE — PROGRESS NOTES
Problem: Falls - Risk of  Goal: *Absence of Falls  Description: Document Yessica Ruts Fall Risk and appropriate interventions in the flowsheet. Outcome: Progressing Towards Goal  Note: Fall Risk Interventions:  Mobility Interventions: Bed/chair exit alarm, Patient to call before getting OOB    Mentation Interventions: Bed/chair exit alarm, Door open when patient unattended, Evaluate medications/consider consulting pharmacy    Medication Interventions: Bed/chair exit alarm, Evaluate medications/consider consulting pharmacy, Patient to call before getting OOB    Elimination Interventions: Call light in reach, Bed/chair exit alarm    History of Falls Interventions: Bed/chair exit alarm, Consult care management for discharge planning         Problem: Pressure Injury - Risk of  Goal: *Prevention of pressure injury  Description: Document Raji Scale and appropriate interventions in the flowsheet.   Outcome: Progressing Towards Goal  Note: Pressure Injury Interventions:  Sensory Interventions: Discuss PT/OT consult with provider, Check visual cues for pain, Avoid rigorous massage over bony prominences, Assess need for specialty bed    Moisture Interventions: Limit adult briefs, Assess need for specialty bed, Apply protective barrier, creams and emollients    Activity Interventions: PT/OT evaluation, Pressure redistribution bed/mattress(bed type), Increase time out of bed    Mobility Interventions: Assess need for specialty bed, Chair cushion    Nutrition Interventions: Document food/fluid/supplement intake, Discuss nutritional consult with provider    Friction and Shear Interventions: Lift sheet, Lift team/patient mobility team                Problem: TIA/CVA Stroke: Day 2 Until Discharge  Goal: Activity/Safety  Outcome: Progressing Towards Goal  Goal: Diagnostic Test/Procedures  Outcome: Progressing Towards Goal  Goal: Nutrition/Diet  Outcome: Progressing Towards Goal  Goal: Discharge Planning  Outcome: Progressing Towards Goal  Goal: Medications  Outcome: Progressing Towards Goal  Goal: Treatments/Interventions/Procedures  Outcome: Progressing Towards Goal

## 2020-08-11 NOTE — PROGRESS NOTES
Bedside and Verbal shift change report given to Nia RN(oncoming nurse) by Zaid Duran RN (offgoing nurse). Report included the following information SBAR, Kardex, Recent Results and Dual Neuro Assessment.

## 2020-08-11 NOTE — PROGRESS NOTES
Problem: Falls - Risk of  Goal: *Absence of Falls  Description: Document [facility] Fall Risk and appropriate interventions in the flowsheet. Outcome: Progressing Towards Goal  Note: Fall Risk Interventions:  Mobility Interventions: Patient to call before getting OOB, Bed/chair exit alarm    Mentation Interventions: Bed/chair exit alarm    Medication Interventions: Bed/chair exit alarm    Elimination Interventions: Call light in reach, Bed/chair exit alarm    History of Falls Interventions: Bed/chair exit alarm         Problem: Patient Education: Go to Patient Education Activity  Goal: Patient/Family Education  Outcome: Progressing Towards Goal     Problem: Pressure Injury - Risk of  Goal: *Prevention of pressure injury  Description: Document Raji Scale and appropriate interventions in the flowsheet.   Outcome: Progressing Towards Goal  Note: Pressure Injury Interventions:  Sensory Interventions: Assess changes in LOC, Assess need for specialty bed, Avoid rigorous massage over bony prominences    Moisture Interventions: Absorbent underpads, Apply protective barrier, creams and emollients, Assess need for specialty bed    Activity Interventions: PT/OT evaluation    Mobility Interventions: Assess need for specialty bed, Chair cushion    Nutrition Interventions: Document food/fluid/supplement intake    Friction and Shear Interventions: Lift sheet                Problem: Patient Education: Go to Patient Education Activity  Goal: Patient/Family Education  Outcome: Progressing Towards Goal     Problem: Patient Education: Go to Patient Education Activity  Goal: Patient/Family Education  Outcome: Progressing Towards Goal     Problem: TIA/CVA Stroke: 0-24 hours  Goal: Off Pathway (Use only if patient is Off Pathway)  Outcome: Progressing Towards Goal  Goal: Activity/Safety  Outcome: Progressing Towards Goal  Goal: Consults, if ordered  Outcome: Progressing Towards Goal  Goal: Diagnostic Test/Procedures  Outcome: Progressing Towards Goal  Goal: Nutrition/Diet  Outcome: Progressing Towards Goal  Goal: Discharge Planning  Outcome: Progressing Towards Goal  Goal: Medications  Outcome: Progressing Towards Goal  Goal: Respiratory  Outcome: Progressing Towards Goal  Goal: Treatments/Interventions/Procedures  Outcome: Progressing Towards Goal  Goal: Minimize risk of bleeding post-thrombolytic infusion  Outcome: Progressing Towards Goal  Goal: Monitor for complications post-thrombolytic infusion  Outcome: Progressing Towards Goal  Goal: Psychosocial  Outcome: Progressing Towards Goal  Goal: *Hemodynamically stable  Outcome: Progressing Towards Goal  Goal: *Neurologically stable  Description: Absence of additional neurological deficits    Outcome: Progressing Towards Goal  Goal: *Verbalizes anxiety and depression are reduced or absent  Outcome: Progressing Towards Goal  Goal: *Absence of Signs of Aspiration on Current Diet  Outcome: Progressing Towards Goal  Goal: *Absence of deep venous thrombosis signs and symptoms(Stroke Metric)  Outcome: Progressing Towards Goal  Goal: *Ability to perform ADLs and demonstrates progressive mobility and function  Outcome: Progressing Towards Goal  Goal: *Stroke education started(Stroke Metric)  Outcome: Progressing Towards Goal  Goal: *Dysphagia screen performed(Stroke Metric)  Outcome: Progressing Towards Goal  Goal: *Rehab consulted(Stroke Metric)  Outcome: Progressing Towards Goal     Problem: TIA/CVA Stroke: Day 2 Until Discharge  Goal: Off Pathway (Use only if patient is Off Pathway)  Outcome: Progressing Towards Goal  Goal: Activity/Safety  Outcome: Progressing Towards Goal  Goal: Diagnostic Test/Procedures  Outcome: Progressing Towards Goal  Goal: Nutrition/Diet  Outcome: Progressing Towards Goal  Goal: Discharge Planning  Outcome: Progressing Towards Goal  Goal: Medications  Outcome: Progressing Towards Goal  Goal: Treatments/Interventions/Procedures  Outcome: Progressing Towards Goal  Goal: *Absence of deep venous thrombosis signs and symptoms(Stroke Metric)  Outcome: Progressing Towards Goal  Goal: *Ability to perform ADLs and demonstrates progressive mobility and function  Outcome: Progressing Towards Goal  Goal: *Stroke education continued(Stroke Metric)  Outcome: Progressing Towards Goal     Problem: Ischemic Stroke: Discharge Outcomes  Goal: *Verbalizes anxiety and depression are reduced or absent  Outcome: Progressing Towards Goal  Goal: *Verbalize understanding of risk factor modification(Stroke Metric)  Outcome: Progressing Towards Goal  Goal: *Hemodynamically stable  Outcome: Progressing Towards Goal  Goal: *Absence of aspiration pneumonia  Outcome: Progressing Towards Goal  Goal: *Aware of needed dietary changes  Outcome: Progressing Towards Goal  Goal: *Verbalize understanding of prescribed medications including anti-coagulants, anti-lipid, and/or anti-platelets(Stroke Metric)  Outcome: Progressing Towards Goal  Goal: *Tolerating diet  Outcome: Progressing Towards Goal  Goal: *Aware of follow-up diagnostics related to anticoagulants  Outcome: Progressing Towards Goal  Goal: *Ability to perform ADLs and demonstrates progressive mobility and function  Outcome: Progressing Towards Goal  Goal: *Absence of DVT(Stroke Metric)  Outcome: Progressing Towards Goal  Goal: *Absence of aspiration  Outcome: Progressing Towards Goal  Goal: *Optimal pain control at patient's stated goal  Outcome: Progressing Towards Goal  Goal: *Home safety concerns addressed  Outcome: Progressing Towards Goal  Goal: *Describes available resources and support systems  Outcome: Progressing Towards Goal  Goal: *Verbalizes understanding of activation of EMS(911) for stroke symptoms(Stroke Metric)  Outcome: Progressing Towards Goal  Goal: *Understands and describes signs and symptoms to report to providers(Stroke Metric)  Outcome: Progressing Towards Goal  Goal: *Neurolgocially stable (absence of additional neurological deficits)  Outcome: Progressing Towards Goal  Goal: *Verbalizes importance of follow-up with primary care physician(Stroke Metric)  Outcome: Progressing Towards Goal  Goal: *Smoking cessation discussed,if applicable(Stroke Metric)  Outcome: Progressing Towards Goal  Goal: *Depression screening completed(Stroke Metric)  Outcome: Progressing Towards Goal     Problem: Diabetes Self-Management  Goal: *Disease process and treatment process  Description: Define diabetes and identify own type of diabetes; list 3 options for treating diabetes. Outcome: Progressing Towards Goal  Goal: *Incorporating nutritional management into lifestyle  Description: Describe effect of type, amount and timing of food on blood glucose; list 3 methods for planning meals. Outcome: Progressing Towards Goal  Goal: *Incorporating physical activity into lifestyle  Description: State effect of exercise on blood glucose levels. Outcome: Progressing Towards Goal  Goal: *Developing strategies to promote health/change behavior  Description: Define the ABC's of diabetes; identify appropriate screenings, schedule and personal plan for screenings. Outcome: Progressing Towards Goal  Goal: *Using medications safely  Description: State effect of diabetes medications on diabetes; name diabetes medication taking, action and side effects. Outcome: Progressing Towards Goal  Goal: *Monitoring blood glucose, interpreting and using results  Description: Identify recommended blood glucose targets  and personal targets. Outcome: Progressing Towards Goal  Goal: *Prevention, detection, treatment of acute complications  Description: List symptoms of hyper- and hypoglycemia; describe how to treat low blood sugar and actions for lowering  high blood glucose level.   Outcome: Progressing Towards Goal  Goal: *Prevention, detection and treatment of chronic complications  Description: Define the natural course of diabetes and describe the relationship of blood glucose levels to long term complications of diabetes.   Outcome: Progressing Towards Goal  Goal: *Developing strategies to address psychosocial issues  Description: Describe feelings about living with diabetes; identify support needed and support network  Outcome: Progressing Towards Goal  Goal: *Insulin pump training  Outcome: Progressing Towards Goal  Goal: *Sick day guidelines  Outcome: Progressing Towards Goal  Goal: *Patient Specific Goal (EDIT GOAL, INSERT TEXT)  Outcome: Progressing Towards Goal

## 2020-08-11 NOTE — CONSULTS
Jakob Marie    Name:  Rush Jaramillo  MR#:  [id number]  :  1940  ACCOUNT #:  [de-identified]  DATE OF SERVICE:  2020      REQUESTING PHYSICIAN:  Mercy Negro MD    REASON FOR CONSULTATION:  Right parotid mass. HISTORY OF PRESENT ILLNESS:  The patient is a 69-year-old male who was admitted to the hospital for acute right parietal hemorrhagic stroke with symptoms of numbness of the left hand. Upon the admission and workup including MRI and CT scan, the patient demonstrated a mass in his right parotid gland, and ENT is consulted for further evaluation. Currently, the patient is unable to give a good history and information mostly obtained from the patient's chart. MEDICATIONS:  Reviewed as noted in his chart. Images of his MRI as well as CT scan were reviewed in which the patient demonstrated a right-sided mass measuring approximately 2 x 1.2 cm on the right side. The patient also demonstrates findings consistent with prior neck surgery as well as parotidectomy on the patient's left side as the CT scan does not demonstrate any evidence of parotid gland and findings suggestive of a prior neck dissection. PHYSICAL EXAMINATION:  GENERAL:  The patient is a well-nourished adult male who is little bit hard of hearing, otherwise alert and oriented. HEENT:  When asked regarding his past head and neck history, the patient reports he had some kind of surgery but does not remember what it was exactly for. Oral cavity and oropharyngeal exam demonstrates normal mucosal lining without any oral lesions or palpable masses. Intranasal examination is also normal without any drainage or lesions. External ears as well as canal is clear of any disease. NECK:  On the right side does demonstrate a soft parotid gland and the mass noted on the MRI and CT scan. It is not really palpable.   Left side demonstrates findings consistent with prior surgery as well as what appears to be a neck dissection. Otherwise, trachea is in the midline and normal laryngeal crepitus is appreciated. No other significant palpable masses or adenopathy. 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 RECOMMENDATIONS:  I would recommend the patient undergo a fine-needle aspiration under ultrasound-guided imaging. Otherwise, the patient should follow up as an outpatient to further manage the right parotid mass.       Josie Delvalle MD      [id number]  D:  08/11/2020 14:38  T:  08/11/2020 18:15  JOB #:  [id number]  CC:  Cynthia Yanes MD

## 2020-08-11 NOTE — PROGRESS NOTES
Problem: Mobility Impaired (Adult and Pediatric)  Goal: *Acute Goals and Plan of Care (Insert Text)  Description:   FUNCTIONAL STATUS PRIOR TO ADMISSION: Patient was modified independent using a single point cane for functional mobility. Very difficult to obtain detailed PLOF info. HOME SUPPORT PRIOR TO ADMISSION: The patient lived with his spouse (and son?) in a 2 story house - but stays all on 1st floor. Very difficult to obtain detailed info. Physical Therapy Goals  Initiated 8/7/2020  1. Patient will move from supine to sit and sit to supine , scoot up and down, and roll side to side in bed with modified independence within 7 day(s). 2.  Patient will transfer from bed to chair and chair to bed with modified independence using the least restrictive device within 7 day(s). 3.  Patient will perform sit to stand with modified independence within 7 day(s). 4.  Patient will ambulate with modified independence for 150 feet with the least restrictive device within 7 day(s). 5.  Patient will improve Ochoa Balance score by 7 points within 7 days. Outcome: Progressing Towards Goal  PHYSICAL THERAPY TREATMENT  Patient: Anita Tavares (65 y.o. male)  Date: 8/11/2020  Diagnosis: ICH (intracerebral hemorrhage) ([facility] [I61.9]   <principal problem not specified>       Precautions: Fall  Chart, physical therapy assessment, plan of care and goals were reviewed. ASSESSMENT  Patient continues with skilled PT services and is progressing towards goals. He was seen w/OT to maximize functional outcomes. Continues to present w/ LUE impairment which is beginning to improve per OT as pt utilizing LUE more than this afternoon than in previous times w/ therapy. Min-Mod Ax1 -2 (for line management)during gait for unsteadiness. Gait from bed to bathroom very unsteady and somewhat impulsive w/ use of SPC (Mod Ax2). Gait steadiness began to improve w/ further amb in [place]jo and as pt demonstrating slower gait speed/merlyn. Mod A provided during gait as pt R foot did not clear floor (x2) causing pt to trip and have mild LOB ant. Pt ended session in chair w/ chair alarm in place and activated. Wife present. Instructed pt and pt wife to call for NSG assist when ready for pt to return to bed. Gait belt provided. Current Level of Function Impacting Discharge (mobility/balance): Min- Mod x1 for gait w/ SPC    Other factors to consider for discharge: lives w/ wife who is pt's caregiver as well         PLAN :  Patient continues to benefit from skilled intervention to address the above impairments. Continue treatment per established plan of care. to address goals. Recommendation for discharge: (in order for the patient to meet his/her long term goals)  Therapy 3 hours per day 5-7 days per week    This discharge recommendation:  Has been made in collaboration with the attending provider and/or case management    IF patient discharges home will need the following DME: patient owns DME required for discharge and to be determined (TBD)       SUBJECTIVE:   Patient stated Nice to meet you.     OBJECTIVE DATA SUMMARY:   Critical Behavior:  Neurologic State: Alert  Orientation Level: Oriented to person, Oriented to place, Oriented to situation, Disoriented to time  Cognition: Follows commands  Safety/Judgement: Decreased awareness of need for assistance, Decreased awareness of need for safety, Decreased insight into deficits  Functional Mobility Training:  Bed Mobility:     Supine to Sit: Supervision;Bed Modified(HOB elevated)  Sit to Supine: (remained OOB in chair)           Transfers:  Sit to Stand: Contact guard assistance(Simultaneous filing. User may not have seen previous data.)  Stand to Sit: Contact guard assistance;Minimum assistance(for eccentric control Simultaneous filing. User may not have seen previous data.)        Bed to Chair: Minimum assistance                    Balance:  Sitting: Intact(Simultaneous filing.  User may not have seen previous data.)  Sitting - Static: Good (unsupported)  Sitting - Dynamic: Fair (occasional)  Standing: Impaired(Simultaneous filing. User may not have seen previous data.)  Standing - Static: Fair(Simultaneous filing. User may not have seen previous data.)  Standing - Dynamic : Fair(Simultaneous filing. User may not have seen previous data.)  Ambulation/Gait Training:  Distance (ft): 100 Feet (ft)  Assistive Device: Cane, straight;Gait belt  Ambulation - Level of Assistance: Minimal assistance; Moderate assistance;Assist x1(occassional Mod A for LOB ant. as pt R foot/toe didnt clear )        Gait Abnormalities: Altered arm swing;Decreased step clearance; Hemiplegic; Shuffling gait; Step to gait;Trunk sway increased        Base of Support: Narrowed  Stance: Left decreased  Speed/Sofi: Shuffled; Slow  Step Length: Left shortened;Right shortened  Swing Pattern: Left asymmetrical      Pain Rating:  No c/o pain     Activity Tolerance:   Good, tolerates ADLs without rest breaks, and SpO2 stable on RA  Please refer to the flowsheet for vital signs taken during this treatment. After treatment patient left in no apparent distress:   Sitting in chair, Call bell within reach, Bed / chair alarm activated, and Caregiver / family present    COMMUNICATION/COLLABORATION:   The patients plan of care was discussed with: Registered nurse.      Libia Moise PTA   Time Calculation: 48 mins

## 2020-08-11 NOTE — PROGRESS NOTES
Bedside shift change report given to Perri RN (oncoming nurse) by Shahzad Lance RN (offgoing nurse). Report included the following information SBAR, Kardex, Intake/Output, MAR, Med Rec Status, Cardiac Rhythm SB, Quality Measures and Dual Neuro Assessment.

## 2020-08-11 NOTE — PROGRESS NOTES
Hospitalist Progress Note  Catrina Lucero MD  Answering service: [phone] OR 36 from in house phone        Date of Service:  2020  NAME:  Maria Del Rosario Manriquez  :  1940  MRN:  [id number]      Admission Summary:   As per initial admission summary  Kandyce Goltz a 67 y. o. with a past medical history of dementia, diabetes, previous R parietal hemorrhagic stroke who [place] [facility] [place] hand numbness that started on Tuesday. CT head revealed acute R parietal hemorrhage. Patient transferred to [facility] for neurosurgical evaluation.      On arrival, patient states that he has been having numbness in his left hand. He states that he did fall today and hit his head on the door. He denies losing consciousness and said \"it wasn't a bad fall. \" Per patient, he falls frequently and uses a cane. On exam, patient hard of hearing, alert and oriented x4. Patient with ataxia of left upper extremity and endorses numbness of left hand. Left upper extremity mildly weaker.  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 Plan for rehab. No acute events overnight. Plan to do repeat imaging at per neurology  Neurology and NIV following  ENT consult pending      Assessment & Plan:     · Acute R parietal hemorrhage, MRI with stable right parietal intraparenchymal hemorrhage with surrounding edema but no mass-effect or midline shift.   Also noted acute infarcts in left inferior parietal lobe as well as chronic hemorrhages with encephalomalacia of the bilateral parieto-occipital lobes right greater than left and superficial siderosis from remote hemorrhage within the right parieto-occipital and temporal lobes and small chronic infarctions bilateral cerebellum. · Neurology and NIS following  · MRI brain with contrast noted. Defer further repeat imaging to neurology unless mental status changes  · SBP < 140, may use Cardene as needed. Currently off. Norvasc added  with PRN hydralazine  · Neurochecks   · Keppra 250 mg BID for seizure ppx  · PT/OT/SLP to eval and treat  · Likely rehab     · Left MCI infarct, small  ? No Antiplatelets with bleeding. Statin Listed on allergy list.   ? Neurology/NIV following As above  ? · Cerebral aneurysm, right A1A2 junction, unruptured  ? NIS following as above  ? Will need outpatient follow-up    · Right parotid mass  ? ENT consulted for further eval  ? Consider ultrasound, defer to ENT, consult pending   ? · TAMEKA, mild, likely prerenal, improving   ? Gentle IVF added with LR 75 ml/hr  ? Follow Cr. Minimize nephrotoxins  ? · Hypertension  · Norvasc added as above. PRN hydralazine  · Dementia  · Continue memantine and donepezil  · Diabetes  · Accu-Cheks and sliding scale insulin  Code status: full   DVT prophylaxis: 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 Vital Signs:    Last 24hrs VS reviewed since prior progress note.  Most recent are:  Visit Vitals  /42 (BP 1 Location: Left arm, BP Patient Position: At rest)   Pulse (!) 58   Temp 98.2 °F (36.8 °C)   Resp 21   Ht 5' 10\" (1.778 m)   Wt 87.9 kg (193 lb 12.6 oz)   SpO2 92%   BMI 27.81 kg/m²         Intake/Output Summary (Last 24 hours) at 8/11/2020 1038  Last data filed at 8/11/2020 0400  Gross per 24 hour Intake 1700 ml   Output    Net 1700 ml        Physical Examination:             Constitutional:  No acute distress,    ENT:  Oral mucous moist, oropharynx benign. Neck supple,    Resp:  CTA bilaterally. No wheezing/rhonchi/rales. No accessory muscle use   CV:  Regular rhythm, normal rate, no murmurs, gallops, rubs    GI:  Soft, non distended, non tender. normoactive bowel sounds, no hepatosplenomegaly     Musculoskeletal:  No edema, warm, 2+ pulses throughout                Data Review:    Review and/or order of clinical lab test  Review and/or order of tests in the radiology section of CPT  Review and/or order of tests in the medicine section of CPT      Labs:     Recent Labs     08/11/20  0600 08/09/20  0601   WBC 7.1 8.2   HGB 12.1 13.1   HCT 36.9 40.5    178     Recent Labs     08/11/20  0600 08/10/20  0525 08/09/20  0601     --  142   K 4.2  --  4.0   *  --  110*   CO2 24  --  25   BUN 25*  --  27*   CREA 1.31*  --  1.36*   *  --  105*   CA 8.2*  --  8.5   MG 2.1 2.2 2.3   PHOS 3.6 3.3 3.7     No results for input(s): ALT, AP, TBIL, TBILI, TP, ALB, GLOB, GGT, AML, LPSE in the last 72 hours. No lab exists for component: SGOT, GPT, AMYP, HLPSE  Recent Labs     08/11/20  0600 08/09/20  0601   INR 1.1 1.1   PTP 10.9 10.9      No results for input(s): FE, TIBC, PSAT, FERR in the last 72 hours. No results found for: FOL, RBCF   No results for input(s): PH, PCO2, PO2 in the last 72 hours. No results for input(s): CPK, CKNDX, TROIQ in the last 72 hours.     No lab exists for component: CPKMB  Lab Results   Component Value Date/Time    Cholesterol, total 106 08/07/2020 03:15 AM    HDL Cholesterol 27 08/07/2020 03:15 AM    LDL, calculated 61.6 08/07/2020 03:15 AM    Triglyceride 87 08/07/2020 03:15 AM    CHOL/HDL Ratio 3.9 08/07/2020 03:15 AM     Lab Results   Component Value Date/Time    Glucose (POC) 109 (H) 08/11/2020 08:27 AM    Glucose (POC) 91 08/11/2020 05:54 AM    Glucose (POC) 125 (H) 08/11/2020 12:17 AM    Glucose (POC) 109 (H) 08/10/2020 06:26 PM    Glucose (POC) 161 (H) 08/10/2020 12:49 PM     No results found for: COLOR, APPRN, SPGRU, REFSG, TAMMY, PROTU, GLUCU, KETU, BILU, UROU, NGHIA, LEUKU, GLUKE, EPSU, BACTU, WBCU, RBCU, CASTS, UCRY      Medications Reviewed:     Current Facility-Administered Medications   Medication Dose Route Frequency    lactated Ringers infusion  75 mL/hr IntraVENous CONTINUOUS    amLODIPine (NORVASC) tablet 5 mg  5 mg Oral DAILY    hydrALAZINE (APRESOLINE) 20 mg/mL injection 10 mg  10 mg IntraVENous Q6H PRN    hydrocortisone (HYCORT) 1 % ointment   Topical BID    sodium chloride (NS) flush 5-40 mL  5-40 mL IntraVENous Q8H    sodium chloride (NS) flush 5-40 mL  5-40 mL IntraVENous PRN    acetaminophen (TYLENOL) tablet 650 mg  650 mg Oral Q6H PRN    Or    acetaminophen (TYLENOL) suppository 650 mg  650 mg Rectal Q6H PRN    polyethylene glycol (MIRALAX) packet 17 g  17 g Oral DAILY PRN    promethazine (PHENERGAN) tablet 12.5 mg  12.5 mg Oral Q6H PRN    Or    ondansetron (ZOFRAN) injection 4 mg  4 mg IntraVENous Q6H PRN    famotidine (PF) (PEPCID) 20 mg in 0.9% sodium chloride 10 mL injection  20 mg IntraVENous Q24H    naloxone (NARCAN) injection 0.4 mg  0.4 mg IntraVENous PRN    insulin lispro (HUMALOG) injection   SubCUTAneous Q6H    glucose chewable tablet 16 g  4 Tab Oral PRN    glucagon (GLUCAGEN) injection 1 mg  1 mg IntraMUSCular PRN    dextrose 10% infusion 0-250 mL  0-250 mL IntraVENous PRN    memantine (NAMENDA) tablet 5 mg  5 mg Oral BID    donepeziL (ARICEPT) tablet 10 mg  10 mg Oral QHS    levETIRAcetam (KEPPRA) 250 mg in 0.9% sodium chloride 100 mL IVPB  250 mg IntraVENous Q12H     ______________________________________________________________________  EXPECTED LENGTH OF STAY: 2d 0h  ACTUAL LENGTH OF STAY:          Ryan Cochran MD

## 2020-08-11 NOTE — PROGRESS NOTES
Bedside and Verbal shift change report given to ÓSCAR Kramer (oncoming nurse) by TRAE BALLARD RN (offgoing nurse). Report included the following information SBAR, Kardex, MAR, Cardiac Rhythm SB and Dual Neuro Assessment.

## 2020-08-12 LAB
ANION GAP SERPL CALC-SCNC: 8 MMOL/L (ref 5–15)
ATRIAL RATE: 50 BPM
BASOPHILS # BLD: 0 K/UL (ref 0–0.1)
BASOPHILS NFR BLD: 0 % (ref 0–1)
BUN SERPL-MCNC: 23 MG/DL (ref 6–20)
BUN/CREAT SERPL: 19 (ref 12–20)
CALCIUM SERPL-MCNC: 8.5 MG/DL (ref 8.5–10.1)
CALCULATED P AXIS, ECG09: 21 DEGREES
CALCULATED R AXIS, ECG10: -8 DEGREES
CALCULATED T AXIS, ECG11: 18 DEGREES
CHLORIDE SERPL-SCNC: 110 MMOL/L (ref 97–108)
CO2 SERPL-SCNC: 23 MMOL/L (ref 21–32)
CREAT SERPL-MCNC: 1.23 MG/DL (ref 0.7–1.3)
DIAGNOSIS, 93000: NORMAL
DIFFERENTIAL METHOD BLD: ABNORMAL
EOSINOPHIL # BLD: 0.5 K/UL (ref 0–0.4)
EOSINOPHIL NFR BLD: 7 % (ref 0–7)
ERYTHROCYTE [DISTWIDTH] IN BLOOD BY AUTOMATED COUNT: 12.1 % (ref 11.5–14.5)
GLUCOSE BLD STRIP.AUTO-MCNC: 112 MG/DL (ref 65–100)
GLUCOSE BLD STRIP.AUTO-MCNC: 179 MG/DL (ref 65–100)
GLUCOSE BLD STRIP.AUTO-MCNC: 210 MG/DL (ref 65–100)
GLUCOSE SERPL-MCNC: 108 MG/DL (ref 65–100)
HCT VFR BLD AUTO: 36.9 % (ref 36.6–50.3)
HGB BLD-MCNC: 12 G/DL (ref 12.1–17)
IMM GRANULOCYTES # BLD AUTO: 0 K/UL (ref 0–0.04)
IMM GRANULOCYTES NFR BLD AUTO: 0 % (ref 0–0.5)
INR PPP: 1.1 (ref 0.9–1.1)
LYMPHOCYTES # BLD: 1.6 K/UL (ref 0.8–3.5)
LYMPHOCYTES NFR BLD: 24 % (ref 12–49)
MAGNESIUM SERPL-MCNC: 2.1 MG/DL (ref 1.6–2.4)
MCH RBC QN AUTO: 32 PG (ref 26–34)
MCHC RBC AUTO-ENTMCNC: 32.5 G/DL (ref 30–36.5)
MCV RBC AUTO: 98.4 FL (ref 80–99)
MONOCYTES # BLD: 0.6 K/UL (ref 0–1)
MONOCYTES NFR BLD: 8 % (ref 5–13)
NEUTS SEG # BLD: 4.1 K/UL (ref 1.8–8)
NEUTS SEG NFR BLD: 60 % (ref 32–75)
NRBC # BLD: 0 K/UL (ref 0–0.01)
NRBC BLD-RTO: 0 PER 100 WBC
P-R INTERVAL, ECG05: 224 MS
PHOSPHATE SERPL-MCNC: 3.4 MG/DL (ref 2.6–4.7)
PLATELET # BLD AUTO: 151 K/UL (ref 150–400)
POTASSIUM SERPL-SCNC: 4.3 MMOL/L (ref 3.5–5.1)
PROTHROMBIN TIME: 10.9 SEC (ref 9–11.1)
Q-T INTERVAL, ECG07: 444 MS
QRS DURATION, ECG06: 100 MS
QTC CALCULATION (BEZET), ECG08: 404 MS
RBC # BLD AUTO: 3.75 M/UL (ref 4.1–5.7)
SERVICE CMNT-IMP: ABNORMAL
SODIUM SERPL-SCNC: 141 MMOL/L (ref 136–145)
VENTRICULAR RATE, ECG03: 50 BPM
WBC # BLD AUTO: 6.9 K/UL (ref 4.1–11.1)

## 2020-08-12 PROCEDURE — 74011250637 HC RX REV CODE- 250/637: Performed by: INTERNAL MEDICINE

## 2020-08-12 PROCEDURE — 97530 THERAPEUTIC ACTIVITIES: CPT

## 2020-08-12 PROCEDURE — 74011250636 HC RX REV CODE- 250/636: Performed by: NURSE PRACTITIONER

## 2020-08-12 PROCEDURE — 97116 GAIT TRAINING THERAPY: CPT

## 2020-08-12 PROCEDURE — 65660000000 HC RM CCU STEPDOWN

## 2020-08-12 PROCEDURE — 83735 ASSAY OF MAGNESIUM: CPT

## 2020-08-12 PROCEDURE — 36415 COLL VENOUS BLD VENIPUNCTURE: CPT

## 2020-08-12 PROCEDURE — 85025 COMPLETE CBC W/AUTO DIFF WBC: CPT

## 2020-08-12 PROCEDURE — 74011000250 HC RX REV CODE- 250: Performed by: NURSE PRACTITIONER

## 2020-08-12 PROCEDURE — 80048 BASIC METABOLIC PNL TOTAL CA: CPT

## 2020-08-12 PROCEDURE — 74011636637 HC RX REV CODE- 636/637: Performed by: NURSE PRACTITIONER

## 2020-08-12 PROCEDURE — 74011250637 HC RX REV CODE- 250/637: Performed by: NURSE PRACTITIONER

## 2020-08-12 PROCEDURE — 84100 ASSAY OF PHOSPHORUS: CPT

## 2020-08-12 PROCEDURE — 85610 PROTHROMBIN TIME: CPT

## 2020-08-12 PROCEDURE — 97535 SELF CARE MNGMENT TRAINING: CPT

## 2020-08-12 PROCEDURE — 74011000258 HC RX REV CODE- 258: Performed by: NURSE PRACTITIONER

## 2020-08-12 PROCEDURE — 82962 GLUCOSE BLOOD TEST: CPT

## 2020-08-12 RX ORDER — BALSAM PERU/CASTOR OIL
OINTMENT (GRAM) TOPICAL 3 TIMES DAILY
Status: DISCONTINUED | OUTPATIENT
Start: 2020-08-12 | End: 2020-08-14 | Stop reason: HOSPADM

## 2020-08-12 RX ADMIN — CASTOR OIL AND BALSAM, PERU: 788; 87 OINTMENT TOPICAL at 17:00

## 2020-08-12 RX ADMIN — FAMOTIDINE 20 MG: 10 INJECTION, SOLUTION INTRAVENOUS at 00:43

## 2020-08-12 RX ADMIN — LEVETIRACETAM 250 MG: 100 INJECTION, SOLUTION INTRAVENOUS at 12:30

## 2020-08-12 RX ADMIN — MEMANTINE 5 MG: 5 TABLET ORAL at 09:57

## 2020-08-12 RX ADMIN — AMLODIPINE BESYLATE 5 MG: 5 TABLET ORAL at 09:57

## 2020-08-12 RX ADMIN — MEMANTINE 5 MG: 5 TABLET ORAL at 17:52

## 2020-08-12 RX ADMIN — HYDROCORTISONE: 1 OINTMENT TOPICAL at 09:58

## 2020-08-12 RX ADMIN — Medication 10 ML: at 22:51

## 2020-08-12 RX ADMIN — INSULIN LISPRO 2 UNITS: 100 INJECTION, SOLUTION INTRAVENOUS; SUBCUTANEOUS at 18:52

## 2020-08-12 RX ADMIN — INSULIN LISPRO 3 UNITS: 100 INJECTION, SOLUTION INTRAVENOUS; SUBCUTANEOUS at 12:26

## 2020-08-12 RX ADMIN — Medication 10 ML: at 14:00

## 2020-08-12 RX ADMIN — LEVETIRACETAM 250 MG: 100 INJECTION, SOLUTION INTRAVENOUS at 00:42

## 2020-08-12 RX ADMIN — Medication 10 ML: at 06:15

## 2020-08-12 RX ADMIN — DONEPEZIL HYDROCHLORIDE 10 MG: 10 TABLET, FILM COATED ORAL at 22:51

## 2020-08-12 RX ADMIN — CASTOR OIL AND BALSAM, PERU: 788; 87 OINTMENT TOPICAL at 22:55

## 2020-08-12 RX ADMIN — HYDROCORTISONE: 1 OINTMENT TOPICAL at 17:00

## 2020-08-12 NOTE — PROGRESS NOTES
Patient observed to be in an altered rhythm as compared to previously documented rhythm. Concern for second degree type one block. NP paged. EKG ordered. EKG completed. NP notified. Orders to monitor BP and mentation. No acute interventions at this time. [id number] Notified that the PT sample did not contain enough blood. Will redraw. [id number] PT redrawn and sent down.

## 2020-08-12 NOTE — PROGRESS NOTES
Problem: Falls - Risk of  Goal: *Absence of Falls  Description: Document Jorge Grayncer Fall Risk and appropriate interventions in the flowsheet. Outcome: Progressing Towards Goal  Note: Fall Risk Interventions:  Mobility Interventions: Bed/chair exit alarm    Mentation Interventions: Bed/chair exit alarm    Medication Interventions: Bed/chair exit alarm    Elimination Interventions: Bed/chair exit alarm    History of Falls Interventions: Bed/chair exit alarm         Problem: Patient Education: Go to Patient Education Activity  Goal: Patient/Family Education  Outcome: Progressing Towards Goal     Problem: Pressure Injury - Risk of  Goal: *Prevention of pressure injury  Description: Document Raji Scale and appropriate interventions in the flowsheet.   Outcome: Progressing Towards Goal  Note: Pressure Injury Interventions:  Sensory Interventions: Keep linens dry and wrinkle-free, Float heels    Moisture Interventions: Minimize layers, Check for incontinence Q2 hours and as needed    Activity Interventions: PT/OT evaluation    Mobility Interventions: Float heels, PT/OT evaluation    Nutrition Interventions: Document food/fluid/supplement intake    Friction and Shear Interventions: Minimize layers                Problem: Patient Education: Go to Patient Education Activity  Goal: Patient/Family Education  Outcome: Progressing Towards Goal     Problem: Patient Education: Go to Patient Education Activity  Goal: Patient/Family Education  Outcome: Progressing Towards Goal     Problem: TIA/CVA Stroke: Day 2 Until Discharge  Goal: Activity/Safety  Outcome: Progressing Towards Goal  Goal: Diagnostic Test/Procedures  Outcome: Progressing Towards Goal  Goal: Nutrition/Diet  Outcome: Progressing Towards Goal  Goal: Discharge Planning  Outcome: Progressing Towards Goal  Goal: Medications  Outcome: Progressing Towards Goal  Goal: Treatments/Interventions/Procedures  Outcome: Progressing Towards Goal  Goal: *Absence of deep venous thrombosis signs and symptoms(Stroke Metric)  Outcome: Progressing Towards Goal  Goal: *Ability to perform ADLs and demonstrates progressive mobility and function  Outcome: Progressing Towards Goal  Goal: *Stroke education continued(Stroke Metric)  Outcome: Progressing Towards Goal

## 2020-08-12 NOTE — WOUND CARE
WOCN Note:  
 
New consult placed for assessment of sacrum and buttocks. Assessed in room 671. PPE: face shield, mask and gloves Chart reviewed. Admitted DX:  ICH (intracerebral hemorrhage) Assessment:  
Patient is alert, communicative and requires assist of 1 with repositioning. IAD:  no 
Bed: total care with foam mattress 049 Layers: 3 Patient wearing briefs for incontinence. Restraints:  none Patient reports no pain. Patient repositioned on left side with pillow. Heels offloaded with pillows. Heels intact without erythema. 1.  Sacrum and buttocks intact with blanching red erythema. Wound, Pressure Prevention & Skin Care Recommendations: 1. Minimize layers of linen/pads under patient to optimize support surface. 2.  Turn/reposition approximately every 2 hours and offload heels. 3.  Manage moisture/ Keep skin folds clean and dry. Use Hydraguard (blue tube). 4.  Sacrum and buttocks:  Venelex TID. Discussed above plan with patient and RN. Transition of Care: Plan to follow as needed while admitted to hospital. 
 
SELENE Arroyo RN [facility] [place] [facility] Available on Perfect [employer] Pager [phone] Office [phone]

## 2020-08-12 NOTE — ROUTINE PROCESS
Bedside and Verbal shift change report given to Nia RN (oncoming nurse) by Js Hightower RN (offgoing nurse). Report included the following information SBAR, Kardex, ED Summary, Procedure Summary, Intake/Output, MAR, Recent Results, Med Rec Status, Cardiac Rhythm SB,BBB,SA. , Alarm Parameters , Quality Measures and Dual Neuro Assessment.

## 2020-08-12 NOTE — PROGRESS NOTES
Problem: Mobility Impaired (Adult and Pediatric)  Goal: *Acute Goals and Plan of Care (Insert Text)  Description:   FUNCTIONAL STATUS PRIOR TO ADMISSION: Patient was modified independent using a single point cane for functional mobility. Very difficult to obtain detailed PLOF info. HOME SUPPORT PRIOR TO ADMISSION: The patient lived with his spouse (and son?) in a 2 story house - but stays all on 1st floor. Very difficult to obtain detailed info. Physical Therapy Goals  Initiated 8/7/2020  1. Patient will move from supine to sit and sit to supine , scoot up and down, and roll side to side in bed with modified independence within 7 day(s). 2.  Patient will transfer from bed to chair and chair to bed with modified independence using the least restrictive device within 7 day(s). 3.  Patient will perform sit to stand with modified independence within 7 day(s). 4.  Patient will ambulate with modified independence for 150 feet with the least restrictive device within 7 day(s). 5.  Patient will improve Ochoa Balance score by 7 points within 7 days. Outcome: Progressing Towards Goal   PHYSICAL THERAPY TREATMENT  Patient: Matthew Yu (64 y.o. male)  Date: 8/12/2020  Diagnosis: [facility]int[facility] [I61.9]   <principal problem not specified>       Precautions: Fall  Chart, physical therapy assessment, plan of care and goals were reviewed. ASSESSMENT  Patient continues with skilled PT services and is progressing towards goals. Patient is received supine in bed. He transitions supine to sit and dons shoes with VC and tactile cues for safety. Patient ambulates around the unit with SPC. He demonstrates fluctuating gait speed with improved fluidity of gait with increased distance. Patient is provided VC to \"stop\" and \"go\" during gait to work on balance and reaction time. He demonstrates no overt LOB balance with gait but requires Min A during gait for safety and balance.  Attempted performing target practice stepping forward with R and L LE to place foot on target. Patient demonstrates good accuracy with R LE but is unable to complete accurately with the L undershooting the target. He transition sit to supine with SBA. Current Level of Function Impacting Discharge (mobility/balance): Min A gait with SPC, SBA bed mobility    Other factors to consider for discharge: medical stability, increased risk for falls, decreased balance, decreased insight in to deficits         PLAN :  Patient continues to benefit from skilled intervention to address the above impairments. Continue treatment per established plan of care. to address goals. Recommendation for discharge: (in order for the patient to meet his/her long term goals)  Therapy 3 hours per day 5-7 days per week    This discharge recommendation:  Has been made in collaboration with the attending provider and/or case management    IF patient discharges home will need the following DME: to be determined (TBD)       SUBJECTIVE:   Patient stated i'm right handed.  - per documentation patient is L handed    OBJECTIVE DATA SUMMARY:   Critical Behavior:  Neurologic State: Alert  Orientation Level: Oriented to person, Oriented to place, Oriented to situation, Disoriented to time  Cognition: Follows commands  Safety/Judgement: Decreased awareness of need for assistance, Decreased awareness of need for safety, Decreased insight into deficits  Functional Mobility Training:  Bed Mobility:     Supine to Sit: Supervision;Bed Modified  Sit to Supine: Stand-by assistance           Transfers:  Sit to Stand: Minimum assistance  Stand to Sit: Contact guard assistance        Bed to Chair: Minimum assistance                    Balance:  Sitting: Intact  Sitting - Static: Good (unsupported)  Sitting - Dynamic: Fair (occasional)  Standing: Impaired  Standing - Static: Fair  Standing - Dynamic : Fair  Ambulation/Gait Training:  Distance (ft): 100 Feet (ft)  Assistive Device: Cane, straight;Gait belt  Ambulation - Level of Assistance: Minimal assistance;Assist x1        Gait Abnormalities: Altered arm swing;Decreased step clearance; Hemiplegic; Shuffling gait; Step to gait;Trunk sway increased        Base of Support: Narrowed     Speed/Sofi: Shuffled;Fluctuations  Step Length: Left shortened;Right shortened  Swing Pattern: Left asymmetrical                 Stairs: Therapeutic Exercises:     Pain Rating:      Activity Tolerance:   Good  Please refer to the flowsheet for vital signs taken during this treatment. After treatment patient left in no apparent distress:   Supine in bed, Call bell within reach, Bed / chair alarm activated, and Side rails x 3    COMMUNICATION/COLLABORATION:   The patients plan of care was discussed with: Occupational therapist and Registered nurse.      Michael Zuniga PT   Time Calculation: 34 mins

## 2020-08-12 NOTE — PROGRESS NOTES
Problem: Falls - Risk of  Goal: *Absence of Falls  Description: Document Lydia Velasquez Fall Risk and appropriate interventions in the flowsheet. Outcome: Progressing Towards Goal  Note: Fall Risk Interventions:  Mobility Interventions: Bed/chair exit alarm    Mentation Interventions: Bed/chair exit alarm    Medication Interventions: Bed/chair exit alarm    Elimination Interventions: Bed/chair exit alarm, Call light in reach    History of Falls Interventions: Bed/chair exit alarm         Problem: Patient Education: Go to Patient Education Activity  Goal: Patient/Family Education  Outcome: Progressing Towards Goal     Problem: Pressure Injury - Risk of  Goal: *Prevention of pressure injury  Description: Document Raji Scale and appropriate interventions in the flowsheet.   Outcome: Progressing Towards Goal  Note: Pressure Injury Interventions:  Sensory Interventions: Assess changes in LOC, Assess need for specialty bed, Avoid rigorous massage over bony prominences    Moisture Interventions: Absorbent underpads, Apply protective barrier, creams and emollients, Assess need for specialty bed    Activity Interventions: PT/OT evaluation    Mobility Interventions: PT/OT evaluation    Nutrition Interventions: Document food/fluid/supplement intake    Friction and Shear Interventions: Apply protective barrier, creams and emollients, Feet elevated on foot rest, Foam dressings/transparent film/skin sealants                Problem: Patient Education: Go to Patient Education Activity  Goal: Patient/Family Education  Outcome: Progressing Towards Goal     Problem: Patient Education: Go to Patient Education Activity  Goal: Patient/Family Education  Outcome: Progressing Towards Goal     Problem: TIA/CVA Stroke: Day 2 Until Discharge  Goal: Off Pathway (Use only if patient is Off Pathway)  Outcome: Progressing Towards Goal  Goal: Activity/Safety  Outcome: Progressing Towards Goal  Goal: Diagnostic Test/Procedures  Outcome: Progressing Towards Goal  Goal: Nutrition/Diet  Outcome: Progressing Towards Goal  Goal: Discharge Planning  Outcome: Progressing Towards Goal  Goal: Medications  Outcome: Progressing Towards Goal  Goal: Treatments/Interventions/Procedures  Outcome: Progressing Towards Goal  Goal: *Absence of deep venous thrombosis signs and symptoms(Stroke Metric)  Outcome: Progressing Towards Goal  Goal: *Ability to perform ADLs and demonstrates progressive mobility and function  Outcome: Progressing Towards Goal  Goal: *Stroke education continued(Stroke Metric)  Outcome: Progressing Towards Goal     Problem: Ischemic Stroke: Discharge Outcomes  Goal: *Verbalizes anxiety and depression are reduced or absent  Outcome: Progressing Towards Goal  Goal: *Verbalize understanding of risk factor modification(Stroke Metric)  Outcome: Progressing Towards Goal  Goal: *Hemodynamically stable  Outcome: Progressing Towards Goal  Goal: *Absence of aspiration pneumonia  Outcome: Progressing Towards Goal  Goal: *Aware of needed dietary changes  Outcome: Progressing Towards Goal  Goal: *Verbalize understanding of prescribed medications including anti-coagulants, anti-lipid, and/or anti-platelets(Stroke Metric)  Outcome: Progressing Towards Goal  Goal: *Tolerating diet  Outcome: Progressing Towards Goal  Goal: *Aware of follow-up diagnostics related to anticoagulants  Outcome: Progressing Towards Goal  Goal: *Ability to perform ADLs and demonstrates progressive mobility and function  Outcome: Progressing Towards Goal  Goal: *Absence of DVT(Stroke Metric)  Outcome: Progressing Towards Goal  Goal: *Absence of aspiration  Outcome: Progressing Towards Goal  Goal: *Optimal pain control at patient's stated goal  Outcome: Progressing Towards Goal  Goal: *Home safety concerns addressed  Outcome: Progressing Towards Goal  Goal: *Describes available resources and support systems  Outcome: Progressing Towards Goal  Goal: *Verbalizes understanding of activation of EMS(911) for stroke symptoms(Stroke Metric)  Outcome: Progressing Towards Goal  Goal: *Understands and describes signs and symptoms to report to providers(Stroke Metric)  Outcome: Progressing Towards Goal  Goal: *Neurolgocially stable (absence of additional neurological deficits)  Outcome: Progressing Towards Goal  Goal: *Verbalizes importance of follow-up with primary care physician(Stroke Metric)  Outcome: Progressing Towards Goal  Goal: *Smoking cessation discussed,if applicable(Stroke Metric)  Outcome: Progressing Towards Goal  Goal: *Depression screening completed(Stroke Metric)  Outcome: Progressing Towards Goal     Problem: Diabetes Self-Management  Goal: *Disease process and treatment process  Description: Define diabetes and identify own type of diabetes; list 3 options for treating diabetes. Outcome: Progressing Towards Goal  Goal: *Incorporating nutritional management into lifestyle  Description: Describe effect of type, amount and timing of food on blood glucose; list 3 methods for planning meals. Outcome: Progressing Towards Goal  Goal: *Incorporating physical activity into lifestyle  Description: State effect of exercise on blood glucose levels. Outcome: Progressing Towards Goal  Goal: *Developing strategies to promote health/change behavior  Description: Define the ABC's of diabetes; identify appropriate screenings, schedule and personal plan for screenings. Outcome: Progressing Towards Goal  Goal: *Using medications safely  Description: State effect of diabetes medications on diabetes; name diabetes medication taking, action and side effects. Outcome: Progressing Towards Goal  Goal: *Monitoring blood glucose, interpreting and using results  Description: Identify recommended blood glucose targets  and personal targets.   Outcome: Progressing Towards Goal  Goal: *Prevention, detection, treatment of acute complications  Description: List symptoms of hyper- and hypoglycemia; describe how to treat low blood sugar and actions for lowering  high blood glucose level. Outcome: Progressing Towards Goal  Goal: *Prevention, detection and treatment of chronic complications  Description: Define the natural course of diabetes and describe the relationship of blood glucose levels to long term complications of diabetes.   Outcome: Progressing Towards Goal  Goal: *Developing strategies to address psychosocial issues  Description: Describe feelings about living with diabetes; identify support needed and support network  Outcome: Progressing Towards Goal  Goal: *Insulin pump training  Outcome: Progressing Towards Goal  Goal: *Sick day guidelines  Outcome: Progressing Towards Goal  Goal: *Patient Specific Goal (EDIT GOAL, INSERT TEXT)  Outcome: Progressing Towards Goal     Problem: Patient Education: Go to Patient Education Activity  Goal: Patient/Family Education  Outcome: Progressing Towards Goal

## 2020-08-12 NOTE — PROGRESS NOTES
Bedside shift change report given to Lenard Castro RN (oncoming nurse) by Edward Ahmadi RN (offgoing nurse). Report included the following information SBAR, Kardex, Intake/Output, MAR, Cardiac Rhythm SB, Alarm Parameters , Quality Measures and Dual Neuro Assessment.

## 2020-08-12 NOTE — PROGRESS NOTES
Hospitalist Progress Note  Aisha Wilhelm MD  Answering service: [phone] from in house phone        Date of Service:  2020  NAME:  Que Guzman  :  1940  MRN:  [id number]      Admission Summary:   As per initial admission summary  Yanna Bowen a 67 y. o. with a past medical history of dementia, diabetes, previous R parietal hemorrhagic stroke who presented to [facility] ED for L hand numbness that started on Tuesday. CT head revealed acute R parietal hemorrhage. Patient transferred to [facility] for neurosurgical evaluation.      On arrival, patient states that he has been having numbness in his left hand. He states that he did fall today and hit his head on the door. He denies losing consciousness and said \"it wasn't a bad fall. \" Per patient, he falls frequently and uses a cane. On exam, patient hard of hearing, alert and oriented x4. Patient with ataxia of left upper extremity and endorses numbness of left hand. Left upper extremity mildly weaker.  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 Plan for rehab. No acute events overnight. Plan to do repeat imaging at per neurology  Neurology and NIV following  ENT consult recommended FNA. Ordered today   Waiting for placement and FNA     Assessment & Plan:     · Acute R parietal hemorrhage, MRI with stable right parietal intraparenchymal hemorrhage with surrounding edema but no mass-effect or midline shift.   Also noted acute infarcts in left inferior parietal lobe as well as chronic hemorrhages with encephalomalacia of the bilateral parieto-occipital lobes right greater than left and superficial siderosis from remote hemorrhage within the right parieto-occipital and temporal lobes and small chronic infarctions bilateral cerebellum. · Neurology and NIS following  · MRI brain with contrast noted. Defer further repeat imaging to neurology unless mental status changes  · SBP < 140, may use Cardene as needed. Currently off. Norvasc added  with PRN hydralazine  · Neurochecks   · Keppra 250 mg BID for seizure ppx  · PT/OT/SLP to eval and treat  · Likely rehab     · Left MCI infarct, small  ? No Antiplatelets with bleeding. Statin Listed on allergy list.   ? Neurology/NIV following As above  ? · Cerebral aneurysm, right A1A2 junction, unruptured  ? NIS following as above  ? Will need outpatient follow-up    · Right parotid mass  ? ENT consulted for further eval  ? Consider ultrasound, defer to ENT, need FNA. Ordered today  ? · TAMEKA, mild, likely prerenal, improving   ? Gentle IVF added with LR 75 ml/hr  ? Follow Cr. Minimize nephrotoxins  ? · Hypertension  · Norvasc added as above. PRN hydralazine  · Dementia  · Continue memantine and donepezil  · Diabetes  · Accu-Cheks and sliding scale insulin  Code status: full   DVT prophylaxis: 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 Vital Signs:    Last 24hrs VS reviewed since prior progress note.  Most recent are:  Visit Vitals  /45 (BP 1 Location: Left arm, BP Patient Position: At rest)   Pulse (!) 42   Temp 97.8 °F (36.6 °C)   Resp 15   Ht 5' 10\" (1.778 m)   Wt 86.9 kg (191 lb 9.3 oz)   SpO2 96%   BMI 27.49 kg/m²         Intake/Output Summary (Last 24 hours) at 8/12/2020 1824  Last data filed at 8/12/2020 1600  Gross per 24 hour   Intake 758.75 ml   Output    Net 758.75 ml        Physical Examination:             Constitutional:  No acute distress,    ENT:  Oral mucous moist, oropharynx benign. Neck supple,    Resp:  CTA bilaterally. No wheezing/rhonchi/rales. No accessory muscle use   CV:  Regular rhythm, normal rate, no murmurs, gallops, rubs    GI:  Soft, non distended, non tender. normoactive bowel sounds, no hepatosplenomegaly     Musculoskeletal:  No edema, warm, 2+ pulses throughout                Data Review:    Review and/or order of clinical lab test  Review and/or order of tests in the radiology section of CPT  Review and/or order of tests in the medicine section of [facility]      Labs:     Recent Labs     08/12/20  0616 08/11/20  0600   WBC 6.9 7.1   HGB 12.0* 12.1   HCT 36.9 36.9    167     Recent Labs     08/12/20  0616 08/11/20  0600 08/10/20  0525    143  --    K 4.3 4.2  --    * 111*  --    CO2 23 24  --    BUN 23* 25*  --    CREA 1.23 1.31*  --    * 105*  --    CA 8.5 8.2*  --    MG 2.1 2.1 2.2   PHOS 3.4 3.6 3.3     No results for input(s): ALT, AP, TBIL, TBILI, TP, ALB, GLOB, GGT, AML, LPSE in the last 72 hours. No lab exists for component: SGOT, GPT, AMYP, HLPSE  Recent Labs     08/12/20  0715 08/11/20  0600   INR 1.1 1.1   PTP 10.9 10.9      No results for input(s): FE, TIBC, PSAT, FERR in the last 72 hours. No results found for: FOL, RBCF   No results for input(s): PH, PCO2, PO2 in the last 72 hours. No results for input(s): CPK, CKNDX, TROIQ in the last 72 hours.     No lab exists for component: CPKMB  Lab Results   Component Value Date/Time    Cholesterol, total 106 08/07/2020 03:15 AM    HDL Cholesterol 27 08/07/2020 03:15 AM    LDL, calculated 61.6 08/07/2020 03:15 AM    Triglyceride 87 08/07/2020 03:15 AM    CHOL/HDL Ratio 3.9 08/07/2020 03:15 AM     Lab Results   Component Value Date/Time    Glucose (POC) 179 (H) 08/12/2020 06:13 PM    Glucose (POC) 210 (H) 08/12/2020 11:43 AM    Glucose (POC) 112 (H) 08/12/2020 06:08 AM    Glucose (POC) 120 (H) 08/11/2020 11:39 PM    Glucose (POC) 171 (H) 08/11/2020 06:35 PM     No results found for: COLOR, APPRN, SPGRU, REFSG, TAMMY, PROTU, GLUCU, KETU, BILU, UROU, NGHIA, LEUKU, GLUKE, EPSU, BACTU, WBCU, RBCU, CASTS, UCRY      Medications Reviewed:     Current Facility-Administered Medications   Medication Dose Route Frequency    balsam peru-castor oiL (VENELEX) ointment   Topical TID    lactated Ringers infusion  75 mL/hr IntraVENous CONTINUOUS    amLODIPine (NORVASC) tablet 5 mg  5 mg Oral DAILY    hydrALAZINE (APRESOLINE) 20 mg/mL injection 10 mg  10 mg IntraVENous Q6H PRN    hydrocortisone (HYCORT) 1 % ointment   Topical BID    sodium chloride (NS) flush 5-40 mL  5-40 mL IntraVENous Q8H    sodium chloride (NS) flush 5-40 mL  5-40 mL IntraVENous PRN    acetaminophen (TYLENOL) tablet 650 mg  650 mg Oral Q6H PRN    Or    acetaminophen (TYLENOL) suppository 650 mg  650 mg Rectal Q6H PRN    polyethylene glycol (MIRALAX) packet 17 g  17 g Oral DAILY PRN    promethazine (PHENERGAN) tablet 12.5 mg  12.5 mg Oral Q6H PRN    Or    ondansetron (ZOFRAN) injection 4 mg  4 mg IntraVENous Q6H PRN    famotidine (PF) (PEPCID) 20 mg in 0.9% sodium chloride 10 mL injection  20 mg IntraVENous Q24H    naloxone (NARCAN) injection 0.4 mg  0.4 mg IntraVENous PRN    insulin lispro (HUMALOG) injection   SubCUTAneous Q6H    glucose chewable tablet 16 g  4 Tab Oral PRN    glucagon (GLUCAGEN) injection 1 mg  1 mg IntraMUSCular PRN    dextrose 10% infusion 0-250 mL  0-250 mL IntraVENous PRN    memantine (NAMENDA) tablet 5 mg  5 mg Oral BID    donepeziL (ARICEPT) tablet 10 mg  10 mg Oral QHS    levETIRAcetam (KEPPRA) 250 mg in 0.9% sodium chloride 100 mL IVPB  250 mg IntraVENous Q12H     ______________________________________________________________________  EXPECTED LENGTH OF STAY: 2d 0h  ACTUAL LENGTH OF STAY:          Anayeli Carrera MD

## 2020-08-12 NOTE — CDMP QUERY
Q2 Patient admitted with Acute R parietal hemorrhage . Noted documentation of Frequent falls in H&P note on 8/7/20. If possible, please document in progress notes and discharge summary if you are treating/evaluating any of the following: ? Traumatic Acute R parietal hemorrhage 
? NontraumaticAcute R parietal hemorrhage  
? Other, please specify ? Unable to clinically determine If Acute R parietal hemorrhage is traumatic, please document if +LOC and approximate length of LOC, if able to be determined. The medical record reflects the following: 
  Risk Factors: 68 y/o male with Hx of dementia, diabetes, previous R parietal hemorrhagic stroke who presented to [facility] ED for L hand numbness. Pt has hx of falls. Found to have Acute R parietal hemorrhage. Clinical Indicators:  
> 8/7/20 H&P; \"He states that he did fall today and hit his head on the door. He denies losing consciousness and said \"it wasn't a bad fall. \" Per patient, he falls frequently and uses a cane. 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 > 8/7/20 H&P: Acute R parietal hemorrhage 1. Possibly due to trauma vs ischemic stroke with hemorrhagic conversion Treatment: admit to hospital, Intensivist consult, Neurosurgery consult, Neurology consult, CT head, SBP < 140, Q1hr neuro checks, Keppra, lisinopril. Cheryle J Rilee RN,BSN Clinical Documentation Integrity [phone]

## 2020-08-12 NOTE — PROGRESS NOTES
Problem: Self Care Deficits Care Plan (Adult)  Goal: *Acute Goals and Plan of Care (Insert Text)  Description:   FUNCTIONAL STATUS PRIOR TO ADMISSION:  Patient's wife present to confirm PLOF. Patient was performing ADL and functional mobility at supervision to modified independent level/ ambulatory using SPC, but with ~5 falls in within last 6 weeks. Patient was standing to to shower with supervision. Staying on 1st floor of house. Patient was receiving OP PT services for balance training. Patient's wife and son were unable to help him recover off the floor after a recent fall. HOME SUPPORT: Patient lived with his wife and adult son. Patient's wife and son performed household IADLs. Occupational Therapy Goals  Initiated 8/10/2020  1. Patient will perform grooming standing at sink with contact guard assistance within 7 day(s). 2.  Patient will perform bathing with supervision/set-up within 7 day(s). 3.  Patient will perform lower body dressing with contact guard assist within 7 day(s). 4.  Patient will perform toilet transfers with contact guard assist within 7 day(s). 5.  Patient will perform all aspects of toileting with contact guard assist within 7 day(s). 6.  Patient will participate in upper extremity therapeutic exercise/activities with supervision/set-up for 10 minutes within 7 day(s). 7.  Patient will utilize energy conservation techniques during functional activities with verbal cues within 7 day(s). Outcome: Progressing Towards Goal    OCCUPATIONAL THERAPY TREATMENT  Patient: Lita Marquez (23 y.o. male)  Date: 8/12/2020  Diagnosis: [facility] [I61.9]   <principal problem not specified>       Precautions: Fall  Chart, occupational therapy assessment, plan of care, and goals were reviewed.     ASSESSMENT  Patient is progressing in OT goals but remains limited by L personal neglect (improving), impaired detection of L visual stimuli/ environment, acute LUE ataxia (improving), impaired standing balance, impaired insight into deficits, and impaired safety awareness s/p admission for R parietal ICH. Patient continues to require cues to integrate LUE into tasks, to locate items in L enviroment, and to attend to L side of body. Patient is LUE dominant at baseline. Patient required constant steadying for ambulating to/ from bathroom and for standing grooming but successfully integrated BUE for washing hands. Patient with history of multiple recent falls at home and is now at increased risk for additional falls. Patient's wife expressed that she and her son are unable to provide adequate assistance and were unable to help patient recover from the floor after a recent fall at home. Patient is below baseline and would benefit from inpatient rehab to maximize functional/ neurological recovery. Current Level of Function Impacting Discharge (ADLs/self-care): minimum to moderate assistance ADLs, minimum assistance sit-stand and steadying assistance ambulating to/from bathroom using SPC       PLAN :  Patient continues to benefit from skilled intervention to address the above impairments. Continue treatment per established plan of care. to address goals. Recommendation for discharge: (in order for the patient to meet his/her long term goals)  Therapy 3 hours per day 5-7 days per week    This discharge recommendation:  Has been made in collaboration with the attending provider and/or case management       SUBJECTIVE:   Patient stated I'd like to walk.     OBJECTIVE DATA SUMMARY:   Cognitive/Behavioral Status:  Neurologic State: Alert  Orientation Level: Oriented to person;Oriented to place;Oriented to situation;Disoriented to time  Cognition: Follows commands             Functional Mobility and Transfers for ADLs:  Bed Mobility:  Supine to Sit: Supervision;Bed Modified  Sit to Supine: Stand-by assistance    Transfers:  Sit to Stand: Minimum assistance     Bed to Chair: Minimum assistance    Balance:  Sitting: Intact  Sitting - Static: Good (unsupported)  Sitting - Dynamic: Fair (occasional)  Standing: Impaired  Standing - Static: Fair  Standing - Dynamic : Fair    ADL Intervention:       Grooming  Washing Hands: Minimum assistance(standing at sink)       Lower Body Dressing Assistance  Shoes with Cloth Laces: Moderate assistance(donned in tailor sit + assisatnce to tie)          Pain:  Patient did not report pain    Activity Tolerance:   Good  VSS    After treatment patient left in no apparent distress:   Supine in bed, Call bell within reach, and Bed / chair alarm activated    COMMUNICATION/COLLABORATION:   The patients plan of care was discussed with: Physical therapist and Registered nurse.      Ludivina Albrecht OT  Time Calculation: 34 mins

## 2020-08-12 NOTE — PROGRESS NOTES
[address] [facility] visit. Mr. Davonte Rosario wanted his wife to leave before the rains come. Davonte Nielson reported that the plans are for Mr. Messer to go to inpatient rehab at [facility]. Prayer and communion offered.     KYLIE Weiss, RN, ACSW, LCSW   Page:  [phone]

## 2020-08-12 NOTE — PROGRESS NOTES
TRANSITION OF CARE PLAN   RUR 15% MODERATE RISK    DISPOSITION--Pending referral to [facility]    COVID-19 test ordered on 8/12 for discharge   TRANSPORT--[facility]    Met with patient and his spouse, Brigette Snyder at bedside to discuss discharge plan. Offered freedom of choice. Family selected [facility]. Sent referral via [employer]; Awaiting response. Further medical work-up; Otolaryngoly/ENT consulted for right parotid mass. Awaiting recommendations. CM to follow.      Verner Seek, MSW,CRM

## 2020-08-13 ENCOUNTER — APPOINTMENT (OUTPATIENT)
Dept: ULTRASOUND IMAGING | Age: 80
DRG: 064 | End: 2020-08-13
Attending: INTERNAL MEDICINE
Payer: MEDICARE

## 2020-08-13 LAB
ANION GAP SERPL CALC-SCNC: 6 MMOL/L (ref 5–15)
BASOPHILS # BLD: 0 K/UL (ref 0–0.1)
BASOPHILS NFR BLD: 0 % (ref 0–1)
BUN SERPL-MCNC: 24 MG/DL (ref 6–20)
BUN/CREAT SERPL: 19 (ref 12–20)
CALCIUM SERPL-MCNC: 8 MG/DL (ref 8.5–10.1)
CHLORIDE SERPL-SCNC: 111 MMOL/L (ref 97–108)
CO2 SERPL-SCNC: 25 MMOL/L (ref 21–32)
CREAT SERPL-MCNC: 1.26 MG/DL (ref 0.7–1.3)
DIFFERENTIAL METHOD BLD: ABNORMAL
EOSINOPHIL # BLD: 0.5 K/UL (ref 0–0.4)
EOSINOPHIL NFR BLD: 7 % (ref 0–7)
ERYTHROCYTE [DISTWIDTH] IN BLOOD BY AUTOMATED COUNT: 12.1 % (ref 11.5–14.5)
GLUCOSE BLD STRIP.AUTO-MCNC: 106 MG/DL (ref 65–100)
GLUCOSE BLD STRIP.AUTO-MCNC: 134 MG/DL (ref 65–100)
GLUCOSE BLD STRIP.AUTO-MCNC: 141 MG/DL (ref 65–100)
GLUCOSE BLD STRIP.AUTO-MCNC: 168 MG/DL (ref 65–100)
GLUCOSE SERPL-MCNC: 116 MG/DL (ref 65–100)
HCT VFR BLD AUTO: 36.4 % (ref 36.6–50.3)
HGB BLD-MCNC: 11.8 G/DL (ref 12.1–17)
IMM GRANULOCYTES # BLD AUTO: 0 K/UL (ref 0–0.04)
IMM GRANULOCYTES NFR BLD AUTO: 0 % (ref 0–0.5)
INR PPP: 1 (ref 0.9–1.1)
LYMPHOCYTES # BLD: 1.7 K/UL (ref 0.8–3.5)
LYMPHOCYTES NFR BLD: 24 % (ref 12–49)
MAGNESIUM SERPL-MCNC: 2 MG/DL (ref 1.6–2.4)
MCH RBC QN AUTO: 31.6 PG (ref 26–34)
MCHC RBC AUTO-ENTMCNC: 32.4 G/DL (ref 30–36.5)
MCV RBC AUTO: 97.3 FL (ref 80–99)
MONOCYTES # BLD: 0.6 K/UL (ref 0–1)
MONOCYTES NFR BLD: 8 % (ref 5–13)
NEUTS SEG # BLD: 4.1 K/UL (ref 1.8–8)
NEUTS SEG NFR BLD: 61 % (ref 32–75)
NRBC # BLD: 0 K/UL (ref 0–0.01)
NRBC BLD-RTO: 0 PER 100 WBC
PHOSPHATE SERPL-MCNC: 3.6 MG/DL (ref 2.6–4.7)
PLATELET # BLD AUTO: 168 K/UL (ref 150–400)
PMV BLD AUTO: 9.4 FL (ref 8.9–12.9)
POTASSIUM SERPL-SCNC: 4 MMOL/L (ref 3.5–5.1)
PROTHROMBIN TIME: 10.7 SEC (ref 9–11.1)
RBC # BLD AUTO: 3.74 M/UL (ref 4.1–5.7)
SERVICE CMNT-IMP: ABNORMAL
SODIUM SERPL-SCNC: 142 MMOL/L (ref 136–145)
WBC # BLD AUTO: 7 K/UL (ref 4.1–11.1)

## 2020-08-13 PROCEDURE — 74011250636 HC RX REV CODE- 250/636: Performed by: NURSE PRACTITIONER

## 2020-08-13 PROCEDURE — 88172 CYTP DX EVAL FNA 1ST EA SITE: CPT

## 2020-08-13 PROCEDURE — 77030014115

## 2020-08-13 PROCEDURE — 97112 NEUROMUSCULAR REEDUCATION: CPT

## 2020-08-13 PROCEDURE — 84100 ASSAY OF PHOSPHORUS: CPT

## 2020-08-13 PROCEDURE — 74011000250 HC RX REV CODE- 250: Performed by: NURSE PRACTITIONER

## 2020-08-13 PROCEDURE — 85025 COMPLETE CBC W/AUTO DIFF WBC: CPT

## 2020-08-13 PROCEDURE — 97530 THERAPEUTIC ACTIVITIES: CPT

## 2020-08-13 PROCEDURE — 80048 BASIC METABOLIC PNL TOTAL CA: CPT

## 2020-08-13 PROCEDURE — 97116 GAIT TRAINING THERAPY: CPT

## 2020-08-13 PROCEDURE — 74011250637 HC RX REV CODE- 250/637: Performed by: NURSE PRACTITIONER

## 2020-08-13 PROCEDURE — 74011636637 HC RX REV CODE- 636/637: Performed by: NURSE PRACTITIONER

## 2020-08-13 PROCEDURE — 10005 FNA BX W/US GDN 1ST LES: CPT

## 2020-08-13 PROCEDURE — 82962 GLUCOSE BLOOD TEST: CPT

## 2020-08-13 PROCEDURE — 85610 PROTHROMBIN TIME: CPT

## 2020-08-13 PROCEDURE — 36415 COLL VENOUS BLD VENIPUNCTURE: CPT

## 2020-08-13 PROCEDURE — 74011000258 HC RX REV CODE- 258: Performed by: NURSE PRACTITIONER

## 2020-08-13 PROCEDURE — 83735 ASSAY OF MAGNESIUM: CPT

## 2020-08-13 PROCEDURE — 88173 CYTOPATH EVAL FNA REPORT: CPT

## 2020-08-13 PROCEDURE — 65660000000 HC RM CCU STEPDOWN

## 2020-08-13 PROCEDURE — 77030003503 HC NDL BIOP TISS BD -B

## 2020-08-13 PROCEDURE — 88305 TISSUE EXAM BY PATHOLOGIST: CPT

## 2020-08-13 PROCEDURE — 0JB43ZX EXCISION OF RIGHT NECK SUBCUTANEOUS TISSUE AND FASCIA, PERCUTANEOUS APPROACH, DIAGNOSTIC: ICD-10-PCS | Performed by: INTERNAL MEDICINE

## 2020-08-13 PROCEDURE — 87635 SARS-COV-2 COVID-19 AMP PRB: CPT

## 2020-08-13 RX ORDER — LIDOCAINE HYDROCHLORIDE 10 MG/ML
10 INJECTION, SOLUTION EPIDURAL; INFILTRATION; INTRACAUDAL; PERINEURAL
Status: COMPLETED | OUTPATIENT
Start: 2020-08-13 | End: 2020-08-13

## 2020-08-13 RX ADMIN — HYDROCORTISONE: 1 OINTMENT TOPICAL at 09:23

## 2020-08-13 RX ADMIN — LIDOCAINE HYDROCHLORIDE 10 ML: 10 INJECTION, SOLUTION EPIDURAL; INFILTRATION; INTRACAUDAL; PERINEURAL at 13:27

## 2020-08-13 RX ADMIN — DONEPEZIL HYDROCHLORIDE 10 MG: 10 TABLET, FILM COATED ORAL at 21:48

## 2020-08-13 RX ADMIN — AMLODIPINE BESYLATE 5 MG: 5 TABLET ORAL at 09:20

## 2020-08-13 RX ADMIN — LEVETIRACETAM 250 MG: 100 INJECTION, SOLUTION INTRAVENOUS at 00:17

## 2020-08-13 RX ADMIN — CASTOR OIL AND BALSAM, PERU: 788; 87 OINTMENT TOPICAL at 18:11

## 2020-08-13 RX ADMIN — MEMANTINE 5 MG: 5 TABLET ORAL at 09:20

## 2020-08-13 RX ADMIN — INSULIN LISPRO 2 UNITS: 100 INJECTION, SOLUTION INTRAVENOUS; SUBCUTANEOUS at 18:07

## 2020-08-13 RX ADMIN — HYDROCORTISONE: 1 OINTMENT TOPICAL at 18:11

## 2020-08-13 RX ADMIN — SODIUM CHLORIDE, SODIUM LACTATE, POTASSIUM CHLORIDE, AND CALCIUM CHLORIDE 75 ML/HR: 600; 310; 30; 20 INJECTION, SOLUTION INTRAVENOUS at 00:22

## 2020-08-13 RX ADMIN — Medication 10 ML: at 21:48

## 2020-08-13 RX ADMIN — Medication 10 ML: at 06:41

## 2020-08-13 RX ADMIN — SODIUM CHLORIDE, SODIUM LACTATE, POTASSIUM CHLORIDE, AND CALCIUM CHLORIDE 75 ML/HR: 600; 310; 30; 20 INJECTION, SOLUTION INTRAVENOUS at 18:16

## 2020-08-13 RX ADMIN — Medication 10 ML: at 15:00

## 2020-08-13 RX ADMIN — CASTOR OIL AND BALSAM, PERU: 788; 87 OINTMENT TOPICAL at 09:23

## 2020-08-13 RX ADMIN — MEMANTINE 5 MG: 5 TABLET ORAL at 18:09

## 2020-08-13 RX ADMIN — CASTOR OIL AND BALSAM, PERU: 788; 87 OINTMENT TOPICAL at 21:48

## 2020-08-13 RX ADMIN — FAMOTIDINE 20 MG: 10 INJECTION, SOLUTION INTRAVENOUS at 00:17

## 2020-08-13 RX ADMIN — LEVETIRACETAM 250 MG: 100 INJECTION, SOLUTION INTRAVENOUS at 14:50

## 2020-08-13 NOTE — PROGRESS NOTES
Occupational Therapy: defer    Chart reviewed, attempted OT treatment session. However, patient is off the floor for fine needle aspiration. Will defer OT at this time and will f/u as able and appropriate.     Ashley Magallanes, OTR/L

## 2020-08-13 NOTE — PROGRESS NOTES
Physical Therapy Note    Patient off the floor for final needle aspiration. Will complete PT tx as able.     Thank you,  Magalie PFEIFFERT

## 2020-08-13 NOTE — CDMP QUERY
Q1, Pt admitted with Acute R parietal hemorrhage. Pt noted to have TAMEKA documented on PN 8/10/20. If possible, please document in the progress notes and d/c summary if you are evaluating and / or treating any of the following: ? Acute kidney injury as evidenced by (Please document evidence) ? Acute kidney insufficiency ? Acute kidney injury ruled out after further study ? Other  
? Unable to determine The medical record reflects the following: 
  Risk Factors: 66 y/o male with PMHx Dementia, DM, Previous CVA, incontinent of urine. Found to have Acute R parietal hemorrhage Clinical Indicators:  
8/10/20 - 8/12/20  MD PN: TAMEKA, mild, likely prerenal 
8/6/2020 17:54 BUN: 30 (H) Creatinine:   1.55 (H) GFR est non-AA: 44 (L) 
8/7/2020 03:16 BUN: 25 (H) Creatinine:   1.13 GFR est non-AA: >60 
8/8/2020 04:28 BUN: 28 (H) Creatinine:   1.26 GFR est non-AA: 55 (L) 
8/9/2020 06:01 BUN: 27 (H) Creatinine:   1.36 (H) GFR est non-AA: 51 (L) 
8/11/2020 06:00 BUN: 25 (H) Creatinine: 1.31 (H) GFR est non-AA: 53 (L) 
8/12/2020 06:16 BUN: 23 (H) Creatinine: 1.23 GFR est non-AA: 57 (L) 
8/13/2020 00:33 BUN: 24 (H) Creatinine: 1.26 GFR est non-AA: 55 (L) To Note: pt is incontinent of urine so only occurrences are documented, however records sparse with 1 occurrence and one documentation of 100ml urine. no leonardo, did have condom cath for 1 day (8/7-8/8). Treatment: gentle IVF hydration, Labs daily for kidney function, minimize nephrotoxins RIFLE  (BSV Approved) RISK:  Increased SCr x 1.5 or GFR decrease > 25% (within 7 days) INJURY:  Increased SCr x 2.0 or GFR decreased > 50% FAILURE:  Increased SCr x 3.0 or GFR decrease > 75% or SCr >4.0 mg/dL or acute increase >0.5 mg/dL LOSS:  Persistent acute renal failure = complete loss of kidney function > 4 weeks END STAGE:  End stage of kidney disease > 3 months KDIGO 
STAGE  1:  Increase in SCr by >/= 0.3 mg/dL within 48 hours or increase in SCr 1.5 to 1.9 times baseline which is known or presumed to have occurred within the prior 7 days STAGE  2:  Increase in SCr to 2.0 to 2.9 times baseline STAGE  3:  Increase in SCr to 3.0 times baseline or increase in SCr to >/= baseline or increase in SCr to >/= 4.0 mg/dL or initiation of renal replacement therapy

## 2020-08-13 NOTE — PROGRESS NOTES
Transition of Care Plan  RUR 15% Moderate     Not Medically ready today-  FNA today   COVID 19 test ordered. Disposition  [facility]-accepted ----pending medical stability and COVID 19 test results     Transportation  [facility] phone [phone]    [facility] [phone] or [phone]     spoke with [phone], [phone] and was informed that patient is accepted to [address] [place] [address] for inpatient rehab once medically stable and COVID 19 results negative. Patient having fine needle aspiration today. Cm will follow up with Romario coyne pending.

## 2020-08-13 NOTE — PROGRESS NOTES
Problem: Mobility Impaired (Adult and Pediatric)  Goal: *Acute Goals and Plan of Care (Insert Text)  Description:   FUNCTIONAL STATUS PRIOR TO ADMISSION: Patient was modified independent using a single point cane for functional mobility. Very difficult to obtain detailed PLOF info. HOME SUPPORT PRIOR TO ADMISSION: The patient lived with his spouse (and son?) in a 2 story house - but stays all on 1st floor. Very difficult to obtain detailed info. Physical Therapy Goals  Initiated 8/7/2020  1. Patient will move from supine to sit and sit to supine , scoot up and down, and roll side to side in bed with modified independence within 7 day(s). 2.  Patient will transfer from bed to chair and chair to bed with modified independence using the least restrictive device within 7 day(s). 3.  Patient will perform sit to stand with modified independence within 7 day(s). 4.  Patient will ambulate with modified independence for 150 feet with the least restrictive device within 7 day(s). 5.  Patient will improve Ochoa Balance score by 7 points within 7 days. Outcome: Progressing Towards Goal   PHYSICAL THERAPY TREATMENT  Patient: Belle Ornelas (40 y.o. male)  Date: 8/13/2020  Diagnosis: ICH (intracerebral hemorrhage) ([facility] [I61.9]   <principal problem not specified>       Precautions: Fall  Chart, physical therapy assessment, plan of care and goals were reviewed. ASSESSMENT  Patient continues with skilled PT services and is progressing towards goals. He is received supine in bed. He continues to require VC for use of the L side attention. Patient attempts to don L shoe on right foot that already has a shoe on. He demonstrates increased difficulty transitioning sit to stand and demonstrates poor immediate standing balance with posterior lean and bracing LE on the bed. Patient demonstrates decreased balance in gait requiring moderate assistance this session.  He requires VC to attend to barriers on his L side as well as physical assistance to prevent LOB. Patient also demonstrates inconsistent use of cane this session. LE coordination is facilitated through verbal cues stepping with the L LE in 4 directions. Patient demonstrates improved accuracy this session but decreased balance. Current Level of Function Impacting Discharge (mobility/balance): Mod A with SPC     Other factors to consider for discharge: medical stability, increased risk for falls, decreased balance, cognitive status         PLAN :  Patient continues to benefit from skilled intervention to address the above impairments. Continue treatment per established plan of care. to address goals. Recommendation for discharge: (in order for the patient to meet his/her long term goals)  Therapy 3 hours per day 5-7 days per week    This discharge recommendation:  Has been made in collaboration with the attending provider and/or case management    IF patient discharges home will need the following DME: to be determined (TBD)       SUBJECTIVE:   Patient stated the IV pole is in my way, that's why I lost my balance.     OBJECTIVE DATA SUMMARY:   Critical Behavior:  Neurologic State: Alert  Orientation Level: Oriented to person, Oriented to place, Oriented to situation  Cognition: Follows commands  Safety/Judgement: Decreased awareness of need for assistance, Decreased awareness of need for safety, Decreased insight into deficits  Functional Mobility Training:  Bed Mobility:     Supine to Sit: Supervision;Bed Modified  Sit to Supine: Supervision;Bed Modified           Transfers:  Sit to Stand: Minimum assistance  Stand to Sit: Minimum assistance        Bed to Chair: Minimum assistance                    Balance:  Sitting: Intact  Sitting - Static: Good (unsupported)  Sitting - Dynamic: Good (unsupported)  Standing: Impaired  Standing - Static: Poor  Standing - Dynamic : Poor  Ambulation/Gait Training:  Distance (ft): 100 Feet (ft)(x2)  Assistive Device: April Pouch, straight;Gait belt  Ambulation - Level of Assistance: Moderate assistance        Gait Abnormalities: Altered arm swing;Decreased step clearance; Hemiplegic; Shuffling gait; Step to gait;Trunk sway increased        Base of Support: Narrowed     Speed/Sofi: Fluctuations; Shuffled  Step Length: Right shortened;Left shortened  Swing Pattern: Left asymmetrical                 Stairs: Therapeutic Exercises:     Pain Rating:      Activity Tolerance:   Good  Please refer to the flowsheet for vital signs taken during this treatment. After treatment patient left in no apparent distress:   Supine in bed, Call bell within reach, Bed / chair alarm activated, and Side rails x 3    COMMUNICATION/COLLABORATION:   The patients plan of care was discussed with: Registered nurse.      Nallely Lyn, PT   Time Calculation: 28 mins

## 2020-08-13 NOTE — PROGRESS NOTES
Problem: Falls - Risk of  Goal: *Absence of Falls  Description: Document Fernando Suarez Fall Risk and appropriate interventions in the flowsheet. Outcome: Progressing Towards Goal  Note: Fall Risk Interventions:  Mobility Interventions: Bed/chair exit alarm    Mentation Interventions: Bed/chair exit alarm    Medication Interventions: Bed/chair exit alarm    Elimination Interventions: Bed/chair exit alarm    History of Falls Interventions: Bed/chair exit alarm         Problem: Pressure Injury - Risk of  Goal: *Prevention of pressure injury  Description: Document Raji Scale and appropriate interventions in the flowsheet. Outcome: Progressing Towards Goal  Note: Pressure Injury Interventions:  Sensory Interventions: Assess changes in LOC, Assess need for specialty bed, Avoid rigorous massage over bony prominences, Discuss PT/OT consult with provider, Float heels, Keep linens dry and wrinkle-free, Maintain/enhance activity level, Minimize linen layers    Moisture Interventions: Minimize layers    Activity Interventions: PT/OT evaluation    Mobility Interventions: Float heels, PT/OT evaluation, Turn and reposition approx.  every two hours(pillow and wedges)    Nutrition Interventions: Document food/fluid/supplement intake    Friction and Shear Interventions: Apply protective barrier, creams and emollients, Feet elevated on foot rest, Foam dressings/transparent film/skin sealants, HOB 30 degrees or less, Lift sheet, Lift team/patient mobility team                Problem: TIA/CVA Stroke: Day 2 Until Discharge  Goal: Off Pathway (Use only if patient is Off Pathway)  Outcome: Progressing Towards Goal  Goal: Activity/Safety  Outcome: Progressing Towards Goal  Goal: Diagnostic Test/Procedures  Outcome: Progressing Towards Goal  Goal: Nutrition/Diet  Outcome: Progressing Towards Goal  Goal: Discharge Planning  Outcome: Progressing Towards Goal  Goal: Medications  Outcome: Progressing Towards Goal  Goal: Treatments/Interventions/Procedures  Outcome: Progressing Towards Goal  Goal: *Absence of deep venous thrombosis signs and symptoms(Stroke Metric)  Outcome: Progressing Towards Goal  Goal: *Ability to perform ADLs and demonstrates progressive mobility and function  Outcome: Progressing Towards Goal  Goal: *Stroke education continued(Stroke Metric)  Outcome: Progressing Towards Goal     Problem: Ischemic Stroke: Discharge Outcomes  Goal: *Verbalizes anxiety and depression are reduced or absent  Outcome: Progressing Towards Goal  Goal: *Verbalize understanding of risk factor modification(Stroke Metric)  Outcome: Progressing Towards Goal  Goal: *Hemodynamically stable  Outcome: Progressing Towards Goal  Goal: *Absence of aspiration pneumonia  Outcome: Progressing Towards Goal  Goal: *Aware of needed dietary changes  Outcome: Progressing Towards Goal  Goal: *Verbalize understanding of prescribed medications including anti-coagulants, anti-lipid, and/or anti-platelets(Stroke Metric)  Outcome: Progressing Towards Goal  Goal: *Tolerating diet  Outcome: Progressing Towards Goal  Goal: *Aware of follow-up diagnostics related to anticoagulants  Outcome: Progressing Towards Goal  Goal: *Ability to perform ADLs and demonstrates progressive mobility and function  Outcome: Progressing Towards Goal  Goal: *Absence of DVT(Stroke Metric)  Outcome: Progressing Towards Goal  Goal: *Absence of aspiration  Outcome: Progressing Towards Goal  Goal: *Optimal pain control at patient's stated goal  Outcome: Progressing Towards Goal  Goal: *Home safety concerns addressed  Outcome: Progressing Towards Goal  Goal: *Describes available resources and support systems  Outcome: Progressing Towards Goal  Goal: *Verbalizes understanding of activation of EMS(911) for stroke symptoms(Stroke Metric)  Outcome: Progressing Towards Goal  Goal: *Understands and describes signs and symptoms to report to providers(Stroke Metric)  Outcome: Progressing Towards Goal  Goal: *Neurolgocially stable (absence of additional neurological deficits)  Outcome: Progressing Towards Goal  Goal: *Verbalizes importance of follow-up with primary care physician(Stroke Metric)  Outcome: Progressing Towards Goal  Goal: *Smoking cessation discussed,if applicable(Stroke Metric)  Outcome: Progressing Towards Goal  Goal: *Depression screening completed(Stroke Metric)  Outcome: Progressing Towards Goal     Problem: Diabetes Self-Management  Goal: *Disease process and treatment process  Description: Define diabetes and identify own type of diabetes; list 3 options for treating diabetes. Outcome: Progressing Towards Goal  Goal: *Incorporating nutritional management into lifestyle  Description: Describe effect of type, amount and timing of food on blood glucose; list 3 methods for planning meals. Outcome: Progressing Towards Goal  Goal: *Incorporating physical activity into lifestyle  Description: State effect of exercise on blood glucose levels. Outcome: Progressing Towards Goal  Goal: *Developing strategies to promote health/change behavior  Description: Define the ABC's of diabetes; identify appropriate screenings, schedule and personal plan for screenings. Outcome: Progressing Towards Goal  Goal: *Using medications safely  Description: State effect of diabetes medications on diabetes; name diabetes medication taking, action and side effects. Outcome: Progressing Towards Goal  Goal: *Monitoring blood glucose, interpreting and using results  Description: Identify recommended blood glucose targets  and personal targets. Outcome: Progressing Towards Goal  Goal: *Prevention, detection, treatment of acute complications  Description: List symptoms of hyper- and hypoglycemia; describe how to treat low blood sugar and actions for lowering  high blood glucose level.   Outcome: Progressing Towards Goal  Goal: *Prevention, detection and treatment of chronic complications  Description: Define the natural course of diabetes and describe the relationship of blood glucose levels to long term complications of diabetes.   Outcome: Progressing Towards Goal  Goal: *Developing strategies to address psychosocial issues  Description: Describe feelings about living with diabetes; identify support needed and support network  Outcome: Progressing Towards Goal  Goal: *Insulin pump training  Outcome: Progressing Towards Goal  Goal: *Sick day guidelines  Outcome: Progressing Towards Goal  Goal: *Patient Specific Goal (EDIT GOAL, INSERT TEXT)  Outcome: Progressing Towards Goal

## 2020-08-13 NOTE — ROUTINE PROCESS
Bedside and Verbal shift change report given to Ortega Rm RN   (oncoming nurse) by Taina Acevedo RN (offgoing nurse). Report included the following information SBAR, Kardex, ED Summary, Procedure Summary, Intake/Output, MAR, Recent Results, Med Rec Status, Cardiac Rhythm SB,BBB,SA. , Alarm Parameters , Procedure Verification, Quality Measures and Dual Neuro Assessment.

## 2020-08-13 NOTE — PROGRESS NOTES
Bedside shift change report given to [facility], [address] [place] [address] (oncoming nurse) by Ananth Olson RN (offgoing nurse). Report included the following information SBAR, Kardex, Intake/Output, MAR, Med Rec Status, Cardiac Rhythm SB, Quality Measures and Dual Neuro Assessment.

## 2020-08-13 NOTE — PROGRESS NOTES
Hospitalist Progress Note  Mary King MD  Answering service: [phone] from in house phone        Date of Service:  2020  NAME:  Lydia Rader  :  1940  MRN:  [id number]      Admission Summary:   As per initial admission summary  Shannan Cesar a 67 y. o. with a past medical history of dementia, diabetes, previous R parietal hemorrhagic stroke who presented to [facility] for L hand numbness that started on Tuesday. CT head revealed acute R parietal hemorrhage. Patient transferred to [facility] for neurosurgical evaluation.      On arrival, patient states that he has been having numbness in his left hand. He states that he did fall today and hit his head on the door. He denies losing consciousness and said \"it wasn't a bad fall. \" Per patient, he falls frequently and uses a cane. On exam, patient hard of hearing, alert and oriented x4. Patient with ataxia of left upper extremity and endorses numbness of left hand. Left upper extremity mildly weaker.  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 Feeling ok. No concerns or overnight events. Assessment & Plan:     · Acute R parietal hemorrhage, MRI with stable right parietal intraparenchymal hemorrhage with surrounding edema but no mass-effect or midline shift. Also noted acute infarcts in left inferior parietal lobe as well as chronic hemorrhages with encephalomalacia of the bilateral parieto-occipital lobes right greater than left and superficial siderosis from remote hemorrhage within the right parieto-occipital and temporal lobes and small chronic infarctions bilateral cerebellum.   · Neurology and NIS following  · MRI brain with contrast noted. Defer further repeat imaging to neurology unless mental status changes  · SBP < 140, may use Cardene as needed. Currently off. Norvasc added  with PRN hydralazine  · Neurochecks   · Keppra 250 mg BID for seizure ppx  · PT/OT/SLP to eval and treat  · Likely rehab     · Left MCI infarct, small  ? No Antiplatelets with bleeding. Statin Listed on allergy list.   ? Neurology/NIV following As above  ? · Cerebral aneurysm, right A1A2 junction, unruptured  ? NIS following as above  ? Will need outpatient follow-up    Right parotid mass  ? ENT consulted for further eval  ? Utrasound, guided FNA done on 8/13  ? Outpatient ENT follow up for Path and further mx    TAMEKA, mild, likely prerenal, improving   ? Gentle IVF added with LR 75 ml/hr  ? Follow Cr. Minimize nephrotoxins  ? · Hypertension  · Norvasc added as above. PRN hydralazine  · Dementia  · Continue memantine and donepezil  · Diabetes  · Accu-Cheks and sliding scale insulin  Code status: full   DVT prophylaxis: 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 Vital Signs:    Last 24hrs VS reviewed since prior progress note.  Most recent are:  Visit Vitals  /44 (BP 1 Location: Right arm, BP Patient Position: At rest)   Pulse (!) 49   Temp 97.3 °F (36.3 °C)   Resp 18   Ht 5' 10\" (1.778 m)   Wt 85.3 kg (188 lb 0.8 oz)   SpO2 98%   BMI 26.98 kg/m²         Intake/Output Summary (Last 24 hours) at 8/13/2020 1345  Last data filed at 8/13/2020 0800  Gross per 24 hour   Intake 1533.75 ml   Output    Net 1533.75 ml Physical Examination:             Constitutional:  No acute distress,    ENT:  Oral mucous moist, oropharynx benign. Neck supple,    Resp:  CTA bilaterally. No wheezing/rhonchi/rales. No accessory muscle use   CV:  Regular rhythm, normal rate, no murmurs, gallops, rubs    GI:  Soft, non distended, non tender. normoactive bowel sounds, no hepatosplenomegaly     Musculoskeletal:  No edema, warm, 2+ pulses throughout       Data Review:    Review and/or order of clinical lab test  Review and/or order of tests in the radiology section of CPT  Review and/or order of tests in the medicine section of CPT    Mri Orb Face Neck W Wo Cont    Result Date: 8/9/2020  IMPRESSION: Nonspecific mass in the right parotid gland. Differential includes benign and malignant parotid lesions including a Warthin's tumor or benign mixed tumor. Lymph node is thought less likely other incidental findings as above    Mra Brain Wo Cont    Result Date: 8/7/2020  IMPRESSION: MRI Brain: 1. Stable right parietal intraparenchymal hemorrhage with mild surrounding edema, but no significant mass effect or midline shift. 2. Punctate acute infarct in the left inferior parietal lobe. 3. Chronic hemorrhages with associated encephalomalacia in the bilateral parieto-occipital lobes, right larger than left. Superficial siderosis from remote hemorrhage within the right parieto-occipital and temporal lobes. 4. Marked generalized parenchymal volume loss and severe chronic microvascular ischemic disease. Small chronic infarcts in the bilateral cerebellum. MRA Head: 1. No evidence of significant stenosis. No evidence of vascular malformation. 2. A 3 mm aneurysm versus infundibulum projecting medially at the right A1-A2 junction. MRA Neck: 1. No evidence of flow-limiting stenosis. 2. Incompletely evaluated mass in the right parotid tail measuring 1.8 x 1.3 cm. Recommend CT neck for further evaluation.  23X     Mra Neck Wo Cont    Result Date: 8/7/2020  IMPRESSION: MRI Brain: 1. Stable right parietal intraparenchymal hemorrhage with mild surrounding edema, but no significant mass effect or midline shift. 2. Punctate acute infarct in the left inferior parietal lobe. 3. Chronic hemorrhages with associated encephalomalacia in the bilateral parieto-occipital lobes, right larger than left. Superficial siderosis from remote hemorrhage within the right parieto-occipital and temporal lobes. 4. Marked generalized parenchymal volume loss and severe chronic microvascular ischemic disease. Small chronic infarcts in the bilateral cerebellum. MRA Head: 1. No evidence of significant stenosis. No evidence of vascular malformation. 2. A 3 mm aneurysm versus infundibulum projecting medially at the right A1-A2 junction. MRA Neck: 1. No evidence of flow-limiting stenosis. 2. Incompletely evaluated mass in the right parotid tail measuring 1.8 x 1.3 cm. Recommend CT neck for further evaluation. 23X     Mri Brain Wo Cont    Result Date: 8/7/2020  IMPRESSION: MRI Brain: 1. Stable right parietal intraparenchymal hemorrhage with mild surrounding edema, but no significant mass effect or midline shift. 2. Punctate acute infarct in the left inferior parietal lobe. 3. Chronic hemorrhages with associated encephalomalacia in the bilateral parieto-occipital lobes, right larger than left. Superficial siderosis from remote hemorrhage within the right parieto-occipital and temporal lobes. 4. Marked generalized parenchymal volume loss and severe chronic microvascular ischemic disease. Small chronic infarcts in the bilateral cerebellum. MRA Head: 1. No evidence of significant stenosis. No evidence of vascular malformation. 2. A 3 mm aneurysm versus infundibulum projecting medially at the right A1-A2 junction. MRA Neck: 1. No evidence of flow-limiting stenosis. 2. Incompletely evaluated mass in the right parotid tail measuring 1.8 x 1.3 cm. Recommend CT neck for further evaluation.  3 [facility] Cont    Result Date: 8/9/2020  IMPRESSION: Nonspecific mass in the right parotid gland. Differential includes benign and malignant parotid lesions including a Warthin's tumor or benign mixed tumor. Lymph node is thought less likely other incidental findings as above    Ct Head Wo Cont    Result Date: 8/10/2020  IMPRESSION: Stable to slight decreased size of right parietal intraparenchymal hemorrhage. Ct Head Wo Cont    Result Date: 8/6/2020  IMPRESSION: Acute right parietal hemorrhage at the vertex. The findings were called to Dr. Hood Hatch on 8/6/2020 at [address] [place] [address] by myself. [phone]     Us Guide Fine Ndl Asp W Image    Result Date: 8/13/2020  IMPRESSION: Successful ultrasound guided FNA and core biopsy of a right parotid mass. Pathology pending    Xr Chest Port    Result Date: 8/8/2020  IMPRESSION: No acute process. Labs:     Recent Labs     08/13/20  0033 08/12/20  0616   WBC 7.0 6.9   HGB 11.8* 12.0*   HCT 36.4* 36.9    151     Recent Labs     08/13/20  0033 08/12/20  0616 08/11/20  0600    141 143   K 4.0 4.3 4.2   * 110* 111*   CO2 25 23 24   BUN 24* 23* 25*   CREA 1.26 1.23 1.31*   * 108* 105*   CA 8.0* 8.5 8.2*   MG 2.0 2.1 2.1   PHOS 3.6 3.4 3.6     No results for input(s): ALT, AP, TBIL, TBILI, TP, ALB, GLOB, GGT, AML, LPSE in the last 72 hours. No lab exists for component: SGOT, GPT, AMYP, HLPSE  Recent Labs     08/13/20  0033 08/12/20  0715 08/11/20  0600   INR 1.0 1.1 1.1   PTP 10.7 10.9 10.9      No results for input(s): FE, TIBC, PSAT, FERR in the last 72 hours. No results found for: FOL, RBCF   No results for input(s): PH, PCO2, PO2 in the last 72 hours. No results for input(s): CPK, CKNDX, TROIQ in the last 72 hours.     No lab exists for component: CPKMB  Lab Results   Component Value Date/Time    Cholesterol, total 106 08/07/2020 03:15 AM    HDL Cholesterol 27 08/07/2020 03:15 AM    LDL, calculated 61.6 08/07/2020 03:15 AM    Triglyceride 87 08/07/2020 03:15 AM CHOL/HDL Ratio 3.9 08/07/2020 03:15 AM     Lab Results   Component Value Date/Time    Glucose (POC) 134 (H) 08/13/2020 06:46 AM    Glucose (POC) 106 (H) 08/13/2020 12:00 AM    Glucose (POC) 179 (H) 08/12/2020 06:13 PM    Glucose (POC) 210 (H) 08/12/2020 11:43 AM    Glucose (POC) 112 (H) 08/12/2020 06:08 AM     No results found for: COLOR, APPRN, SPGRU, REFSG, TAMMY, PROTU, GLUCU, KETU, BILU, UROU, NGHIA, LEUKU, GLUKE, EPSU, BACTU, WBCU, RBCU, CASTS, UCRY      Medications Reviewed:     Current Facility-Administered Medications   Medication Dose Route Frequency    balsam peru-castor oiL (VENELEX) ointment   Topical TID    lactated Ringers infusion  75 mL/hr IntraVENous CONTINUOUS    amLODIPine (NORVASC) tablet 5 mg  5 mg Oral DAILY    hydrALAZINE (APRESOLINE) 20 mg/mL injection 10 mg  10 mg IntraVENous Q6H PRN    hydrocortisone (HYCORT) 1 % ointment   Topical BID    sodium chloride (NS) flush 5-40 mL  5-40 mL IntraVENous Q8H    sodium chloride (NS) flush 5-40 mL  5-40 mL IntraVENous PRN    acetaminophen (TYLENOL) tablet 650 mg  650 mg Oral Q6H PRN    Or    acetaminophen (TYLENOL) suppository 650 mg  650 mg Rectal Q6H PRN    polyethylene glycol (MIRALAX) packet 17 g  17 g Oral DAILY PRN    promethazine (PHENERGAN) tablet 12.5 mg  12.5 mg Oral Q6H PRN    Or    ondansetron (ZOFRAN) injection 4 mg  4 mg IntraVENous Q6H PRN    famotidine (PF) (PEPCID) 20 mg in 0.9% sodium chloride 10 mL injection  20 mg IntraVENous Q24H    naloxone (NARCAN) injection 0.4 mg  0.4 mg IntraVENous PRN    insulin lispro (HUMALOG) injection   SubCUTAneous Q6H    glucose chewable tablet 16 g  4 Tab Oral PRN    glucagon (GLUCAGEN) injection 1 mg  1 mg IntraMUSCular PRN    dextrose 10% infusion 0-250 mL  0-250 mL IntraVENous PRN    memantine (NAMENDA) tablet 5 mg  5 mg Oral BID    donepeziL (ARICEPT) tablet 10 mg  10 mg Oral QHS    levETIRAcetam (KEPPRA) 250 mg in 0.9% sodium chloride 100 mL IVPB  250 mg IntraVENous Q12H ______________________________________________________________________  EXPECTED LENGTH OF STAY: 2d 0h  ACTUAL LENGTH OF STAY:          Taran Arcos MD

## 2020-08-14 VITALS
RESPIRATION RATE: 18 BRPM | SYSTOLIC BLOOD PRESSURE: 125 MMHG | TEMPERATURE: 97.8 F | WEIGHT: 191.14 LBS | HEART RATE: 62 BPM | BODY MASS INDEX: 27.36 KG/M2 | DIASTOLIC BLOOD PRESSURE: 39 MMHG | HEIGHT: 70 IN | OXYGEN SATURATION: 94 %

## 2020-08-14 LAB
ANION GAP SERPL CALC-SCNC: 8 MMOL/L (ref 5–15)
BASOPHILS # BLD: 0 K/UL (ref 0–0.1)
BASOPHILS NFR BLD: 0 % (ref 0–1)
BUN SERPL-MCNC: 24 MG/DL (ref 6–20)
BUN/CREAT SERPL: 20 (ref 12–20)
CALCIUM SERPL-MCNC: 8.1 MG/DL (ref 8.5–10.1)
CHLORIDE SERPL-SCNC: 109 MMOL/L (ref 97–108)
CO2 SERPL-SCNC: 24 MMOL/L (ref 21–32)
CREAT SERPL-MCNC: 1.22 MG/DL (ref 0.7–1.3)
DIFFERENTIAL METHOD BLD: ABNORMAL
EOSINOPHIL # BLD: 0.5 K/UL (ref 0–0.4)
EOSINOPHIL NFR BLD: 6 % (ref 0–7)
ERYTHROCYTE [DISTWIDTH] IN BLOOD BY AUTOMATED COUNT: 11.9 % (ref 11.5–14.5)
GLUCOSE BLD STRIP.AUTO-MCNC: 123 MG/DL (ref 65–100)
GLUCOSE BLD STRIP.AUTO-MCNC: 170 MG/DL (ref 65–100)
GLUCOSE SERPL-MCNC: 102 MG/DL (ref 65–100)
HCT VFR BLD AUTO: 35 % (ref 36.6–50.3)
HEALTH STATUS, XMCV2T: NORMAL
HGB BLD-MCNC: 11.4 G/DL (ref 12.1–17)
IMM GRANULOCYTES # BLD AUTO: 0 K/UL (ref 0–0.04)
IMM GRANULOCYTES NFR BLD AUTO: 0 % (ref 0–0.5)
INR PPP: 1.1 (ref 0.9–1.1)
LYMPHOCYTES # BLD: 1.9 K/UL (ref 0.8–3.5)
LYMPHOCYTES NFR BLD: 25 % (ref 12–49)
MAGNESIUM SERPL-MCNC: 2 MG/DL (ref 1.6–2.4)
MCH RBC QN AUTO: 31.8 PG (ref 26–34)
MCHC RBC AUTO-ENTMCNC: 32.6 G/DL (ref 30–36.5)
MCV RBC AUTO: 97.8 FL (ref 80–99)
MONOCYTES # BLD: 0.6 K/UL (ref 0–1)
MONOCYTES NFR BLD: 8 % (ref 5–13)
NEUTS SEG # BLD: 4.7 K/UL (ref 1.8–8)
NEUTS SEG NFR BLD: 61 % (ref 32–75)
NRBC # BLD: 0 K/UL (ref 0–0.01)
NRBC BLD-RTO: 0 PER 100 WBC
PHOSPHATE SERPL-MCNC: 3.4 MG/DL (ref 2.6–4.7)
PLATELET # BLD AUTO: 163 K/UL (ref 150–400)
PMV BLD AUTO: 9.4 FL (ref 8.9–12.9)
POTASSIUM SERPL-SCNC: 3.8 MMOL/L (ref 3.5–5.1)
PROTHROMBIN TIME: 10.9 SEC (ref 9–11.1)
RBC # BLD AUTO: 3.58 M/UL (ref 4.1–5.7)
SARS-COV-2, COV2: NOT DETECTED
SERVICE CMNT-IMP: ABNORMAL
SERVICE CMNT-IMP: ABNORMAL
SODIUM SERPL-SCNC: 141 MMOL/L (ref 136–145)
SOURCE, COVRS: NORMAL
SPECIMEN SOURCE, FCOV2M: NORMAL
SPECIMEN TYPE, XMCV1T: NORMAL
WBC # BLD AUTO: 7.7 K/UL (ref 4.1–11.1)

## 2020-08-14 PROCEDURE — 74011000250 HC RX REV CODE- 250: Performed by: NURSE PRACTITIONER

## 2020-08-14 PROCEDURE — 82962 GLUCOSE BLOOD TEST: CPT

## 2020-08-14 PROCEDURE — 74011636637 HC RX REV CODE- 636/637: Performed by: NURSE PRACTITIONER

## 2020-08-14 PROCEDURE — 85025 COMPLETE CBC W/AUTO DIFF WBC: CPT

## 2020-08-14 PROCEDURE — 74011000258 HC RX REV CODE- 258: Performed by: NURSE PRACTITIONER

## 2020-08-14 PROCEDURE — 74011250637 HC RX REV CODE- 250/637: Performed by: NURSE PRACTITIONER

## 2020-08-14 PROCEDURE — 83735 ASSAY OF MAGNESIUM: CPT

## 2020-08-14 PROCEDURE — 74011250636 HC RX REV CODE- 250/636: Performed by: NURSE PRACTITIONER

## 2020-08-14 PROCEDURE — 80048 BASIC METABOLIC PNL TOTAL CA: CPT

## 2020-08-14 PROCEDURE — 85610 PROTHROMBIN TIME: CPT

## 2020-08-14 PROCEDURE — 36415 COLL VENOUS BLD VENIPUNCTURE: CPT

## 2020-08-14 PROCEDURE — 84100 ASSAY OF PHOSPHORUS: CPT

## 2020-08-14 RX ORDER — AMLODIPINE BESYLATE 5 MG/1
5 TABLET ORAL DAILY
Qty: 30 TAB | Refills: 0 | Status: SHIPPED
Start: 2020-08-15 | End: 2020-11-06

## 2020-08-14 RX ORDER — BALSAM PERU/CASTOR OIL
OINTMENT (GRAM) TOPICAL 3 TIMES DAILY
Qty: 1 TUBE | Refills: 0 | Status: SHIPPED
Start: 2020-08-14

## 2020-08-14 RX ORDER — ACETAMINOPHEN 325 MG/1
650 TABLET ORAL
Qty: 30 TAB | Refills: 0 | Status: SHIPPED
Start: 2020-08-14

## 2020-08-14 RX ORDER — POLYETHYLENE GLYCOL 3350 17 G/17G
17 POWDER, FOR SOLUTION ORAL
Qty: 10 EACH | Refills: 0 | Status: SHIPPED
Start: 2020-08-14

## 2020-08-14 RX ORDER — MAGNESIUM SULFATE 100 %
4 CRYSTALS MISCELLANEOUS AS NEEDED
Qty: 30 TAB | Refills: 0 | Status: SHIPPED
Start: 2020-08-14

## 2020-08-14 RX ORDER — MAG HYDROX/ALUMINUM HYD/SIMETH 200-200-20
SUSPENSION, ORAL (FINAL DOSE FORM) ORAL 2 TIMES DAILY
Qty: 30 G | Refills: 0 | Status: SHIPPED | OUTPATIENT
Start: 2020-08-14

## 2020-08-14 RX ORDER — LEVETIRACETAM 250 MG/1
250 TABLET ORAL 2 TIMES DAILY
Qty: 60 TAB | Refills: 0 | Status: SHIPPED
Start: 2020-08-14 | End: 2020-09-21 | Stop reason: SDUPTHER

## 2020-08-14 RX ADMIN — HYDROCORTISONE: 1 OINTMENT TOPICAL at 08:54

## 2020-08-14 RX ADMIN — MEMANTINE 5 MG: 5 TABLET ORAL at 08:51

## 2020-08-14 RX ADMIN — CASTOR OIL AND BALSAM, PERU: 788; 87 OINTMENT TOPICAL at 08:54

## 2020-08-14 RX ADMIN — INSULIN LISPRO 2 UNITS: 100 INJECTION, SOLUTION INTRAVENOUS; SUBCUTANEOUS at 12:20

## 2020-08-14 RX ADMIN — FAMOTIDINE 20 MG: 10 INJECTION, SOLUTION INTRAVENOUS at 00:05

## 2020-08-14 RX ADMIN — AMLODIPINE BESYLATE 5 MG: 5 TABLET ORAL at 08:51

## 2020-08-14 RX ADMIN — Medication 10 ML: at 06:35

## 2020-08-14 RX ADMIN — INSULIN LISPRO 2 UNITS: 100 INJECTION, SOLUTION INTRAVENOUS; SUBCUTANEOUS at 00:05

## 2020-08-14 RX ADMIN — LEVETIRACETAM 250 MG: 100 INJECTION, SOLUTION INTRAVENOUS at 00:05

## 2020-08-14 RX ADMIN — LEVETIRACETAM 250 MG: 100 INJECTION, SOLUTION INTRAVENOUS at 12:20

## 2020-08-14 NOTE — PROGRESS NOTES
Bedside and Verbal shift change report given to OzzyRN (oncoming nurse) by Bozena Joe (offgoing nurse). Report included the following information SBAR, Kardex, Intake/Output, MAR, Recent Results, Cardiac Rhythm Sinus Maryfrances Finders and Alarm Parameters .

## 2020-08-14 NOTE — PROGRESS NOTES
TRANSFER - OUT REPORT:    Verbal report given to Hermelindo CANTRELL(name) on Maryuri Machado  being transferred to [facility] rehab(unit) for routine progression of care       Report consisted of patients Situation, Background, Assessment and   Recommendations(SBAR). Information from the following report(s) SBAR, Kardex, Intake/Output, MAR, Recent Results, Cardiac Rhythm SA 1 AVB and Dual Neuro Assessment was reviewed with the receiving nurse. Lines:       Opportunity for questions and clarification was provided. Patient transported with:   Patient-specific medications from Pharmacy          I have reviewed discharge instructions with the patient. The patient verbalized understanding. Patient discharged with [facility] with belongings, IV removed and telemetry removed.

## 2020-08-14 NOTE — PROGRESS NOTES
Inpatient Rehab/ Hospital to Hospital Transition of Care Note /Discharge Note       EMTALA filed and ready for MD to sign at bedside chart. Accepting Physician: Romain Benjamin MD     Discharging Physician: Minnie Zarco MD    Accepting Representative:     Accepting Facility: [facility]    RN call to report: [phone]  Faxed clinicals: [phone]    Transport: [facility] phone [phone]      time: 2P    Ambulance packet at bedside chart. Family notified: CM met with patient and his wife, Isabel Tan at bedside to confirm discharge plan and they are in agreement with the discharge plan. The attending physician and the primary nurse were notified of the plan. CM available in case needs arise.  Derek Vieyra, MSW,CRM

## 2020-08-14 NOTE — PROGRESS NOTES
Problem: Falls - Risk of  Goal: *Absence of Falls  Description: Document Carine Shane Fall Risk and appropriate interventions in the flowsheet. Outcome: Progressing Towards Goal  Note: Fall Risk Interventions:  Mobility Interventions: Communicate number of staff needed for ambulation/transfer, Patient to call before getting OOB, PT Consult for mobility concerns    Mentation Interventions: Door open when patient unattended, Adequate sleep, hydration, pain control, Increase mobility, More frequent rounding, Reorient patient    Medication Interventions: Evaluate medications/consider consulting pharmacy, Patient to call before getting OOB, Teach patient to arise slowly    Elimination Interventions: Call light in reach, Patient to call for help with toileting needs, Stay With Me (per policy), Toilet paper/wipes in reach, Toileting schedule/hourly rounds    History of Falls Interventions: Door open when patient unattended, Room close to nurse's station         Problem: Pressure Injury - Risk of  Goal: *Prevention of pressure injury  Description: Document Raji Scale and appropriate interventions in the flowsheet.   Outcome: Progressing Towards Goal  Note: Pressure Injury Interventions:  Sensory Interventions: Assess changes in LOC, Check visual cues for pain, Discuss PT/OT consult with provider, Float heels, Keep linens dry and wrinkle-free, Maintain/enhance activity level, Minimize linen layers    Moisture Interventions: Absorbent underpads, Apply protective barrier, creams and emollients, Check for incontinence Q2 hours and as needed, Internal/External urinary devices    Activity Interventions: Increase time out of bed, Pressure redistribution bed/mattress(bed type), PT/OT evaluation    Mobility Interventions: HOB 30 degrees or less, Float heels, Pressure redistribution bed/mattress (bed type), PT/OT evaluation    Nutrition Interventions: Document food/fluid/supplement intake, Offer support with meals,snacks and hydration    Friction and Shear Interventions: Apply protective barrier, creams and emollients, HOB 30 degrees or less, Lift sheet, Minimize layers, Lift team/patient mobility team                Problem: TIA/CVA Stroke: Day 2 Until Discharge  Goal: Activity/Safety  Outcome: Progressing Towards Goal  Goal: Diagnostic Test/Procedures  Outcome: Progressing Towards Goal  Goal: Nutrition/Diet  Outcome: Progressing Towards Goal  Goal: Medications  Outcome: Progressing Towards Goal  Goal: Treatments/Interventions/Procedures  Outcome: Progressing Towards Goal  Goal: *Absence of deep venous thrombosis signs and symptoms(Stroke Metric)  Outcome: Progressing Towards Goal  Goal: *Ability to perform ADLs and demonstrates progressive mobility and function  Outcome: Progressing Towards Goal     Problem: Diabetes Self-Management  Goal: *Disease process and treatment process  Description: Define diabetes and identify own type of diabetes; list 3 options for treating diabetes. Outcome: Progressing Towards Goal  Goal: *Incorporating nutritional management into lifestyle  Description: Describe effect of type, amount and timing of food on blood glucose; list 3 methods for planning meals. Outcome: Progressing Towards Goal  Goal: *Incorporating physical activity into lifestyle  Description: State effect of exercise on blood glucose levels. Outcome: Progressing Towards Goal  Goal: *Developing strategies to promote health/change behavior  Description: Define the ABC's of diabetes; identify appropriate screenings, schedule and personal plan for screenings.   Outcome: Progressing Towards Goal     Problem: Patient Education: Go to Patient Education Activity  Goal: Patient/Family Education  Outcome: Progressing Towards Goal

## 2020-08-14 NOTE — PROGRESS NOTES
Problem: Falls - Risk of  Goal: *Absence of Falls  Description: Document Edna Elmore Fall Risk and appropriate interventions in the flowsheet. Outcome: Progressing Towards Goal  Note: Fall Risk Interventions:  Mobility Interventions: Bed/chair exit alarm, OT consult for ADLs, Patient to call before getting OOB, PT Consult for mobility concerns    Mentation Interventions: Adequate sleep, hydration, pain control, Bed/chair exit alarm, Door open when patient unattended, More frequent rounding, Toileting rounds    Medication Interventions: Bed/chair exit alarm, Patient to call before getting OOB, Teach patient to arise slowly    Elimination Interventions: Bed/chair exit alarm, Call light in reach, Toileting schedule/hourly rounds    History of Falls Interventions: Bed/chair exit alarm, Investigate reason for fall, Room close to nurse's station, Door open when patient unattended         Problem: Pressure Injury - Risk of  Goal: *Prevention of pressure injury  Description: Document Raji Scale and appropriate interventions in the flowsheet.   Outcome: Progressing Towards Goal  Note: Pressure Injury Interventions:  Sensory Interventions: Assess changes in LOC, Discuss PT/OT consult with provider, Keep linens dry and wrinkle-free, Minimize linen layers    Moisture Interventions: Absorbent underpads, Apply protective barrier, creams and emollients, Minimize layers    Activity Interventions: Increase time out of bed, PT/OT evaluation    Mobility Interventions: Assess need for specialty bed, Float heels, PT/OT evaluation    Nutrition Interventions: Document food/fluid/supplement intake    Friction and Shear Interventions: Apply protective barrier, creams and emollients, Lift sheet, Minimize layers                Problem: TIA/CVA Stroke: Day 2 Until Discharge  Goal: Activity/Safety  Outcome: Progressing Towards Goal  Goal: Diagnostic Test/Procedures  Outcome: Progressing Towards Goal  Goal: Nutrition/Diet  Outcome: Progressing Towards Goal  Goal: Discharge Planning  Outcome: Progressing Towards Goal  Goal: Medications  Outcome: Progressing Towards Goal  Goal: Treatments/Interventions/Procedures  Outcome: Progressing Towards Goal  Goal: *Absence of deep venous thrombosis signs and symptoms(Stroke Metric)  Outcome: Progressing Towards Goal  Goal: *Ability to perform ADLs and demonstrates progressive mobility and function  Outcome: Progressing Towards Goal     Problem: Ischemic Stroke: Discharge Outcomes  Goal: *Aware of needed dietary changes  Outcome: Progressing Towards Goal  Goal: *Verbalize understanding of prescribed medications including anti-coagulants, anti-lipid, and/or anti-platelets(Stroke Metric)  Outcome: Progressing Towards Goal  Goal: *Tolerating diet  Outcome: Progressing Towards Goal  Goal: *Ability to perform ADLs and demonstrates progressive mobility and function  Outcome: Progressing Towards Goal  Goal: *Absence of DVT(Stroke Metric)  Outcome: Progressing Towards Goal  Goal: *Home safety concerns addressed  Outcome: Progressing Towards Goal  Goal: *Describes available resources and support systems  Outcome: Progressing Towards Goal

## 2020-08-14 NOTE — PROGRESS NOTES
Attempted to call report to [address] [place] [address], Nurse with a patient, will call back.

## 2020-08-14 NOTE — PROGRESS NOTES
Bedside shift change report given to [address] Jose (oncoming nurse) by Josie Wheeler RN (offgoing nurse). Report included the following information SBAR, MAR, Recent Results, Cardiac Rhythm SA w/ 1st degree AV Block  and Dual Neuro Assessment.

## 2020-08-14 NOTE — DISCHARGE SUMMARY
Discharge Summary       PATIENT ID: Yuly Gar  MRN: [id number]   YOB: 1940    DATE OF ADMISSION: 8/6/2020 11:14 PM    DATE OF DISCHARGE: 08/14/20   PRIMARY CARE PROVIDER: Rona Whalen MD     ATTENDING PHYSICIAN: Melissa Cortez MD  DISCHARGING PROVIDER: Melissa Cortez MD    To contact this individual call [phone] and ask the  to page. If unavailable ask to be transferred the Adult Hospitalist Department. CONSULTATIONS: IP CONSULT TO NEUROSURGERY  IP CONSULT TO NEUROLOGY  IP CONSULT TO NEUROINTERVENTIONAL SURGERY  IP CONSULT TO NEUROINTERVENTIONAL SURGERY  IP CONSULT TO OTOLARYNGOLOGY  IP CONSULT TO HOSPITALIST  IP CONSULT TO CARDIOLOGY    PROCEDURES/SURGERIES: * No surgery found *    ADMITTING DIAGNOSES & HOSPITAL COURSE:   # Acute R parietal hemorrhage, MRI with stable right parietal intraparenchymal hemorrhage with surrounding edema but no mass-effect or midline shift. Unable to clinically determine traumatic or non-traumatic. # Left small MCI infarcts. No Antiplatelets with bleeding. Statin Listed on allergy list.   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 F/up with ENT outpatient for further mx and Biopsy results  # Mild Acute renal insufficiency, prerenal. Better  # Hypertension  # Dementia  # Diabetes    Admission Summary:   As per initial admission summary  Henry Herzog a 67 y. o. with a past medical history of dementia, diabetes, previous R parietal hemorrhagic stroke who presented to [facility] ED for L hand numbness that started on Tuesday. CT head revealed acute R parietal hemorrhage. Patient transferred to [facility] for neurosurgical evaluation.      On arrival, patient states that he has been having numbness in his left hand. He states that he did fall today and hit his head on the door. He denies losing consciousness and said \"it wasn't a bad fall. \" Per patient, he falls frequently and uses a cane.  On exam, patient hard of hearing, alert and oriented x4. Patient with ataxia of left upper extremity and endorses numbness of left hand. Left upper extremity mildly weaker.  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 Doing well. Denied any pain. No N/V/CP/SOB/Dizziness. No other concerns or overnight events. DISCHARGE DIAGNOSES / PLAN:      · Acute R parietal hemorrhage, MRI with stable right parietal intraparenchymal hemorrhage with surrounding edema but no mass-effect or midline shift.  Also noted acute infarcts in left inferior parietal lobe as well as chronic hemorrhages with encephalomalacia of the bilateral parieto-occipital lobes right greater than left and superficial siderosis from remote hemorrhage within the right parieto-occipital and temporal lobes and small chronic infarctions bilateral cerebellum. · Neurology and NIS following  · MRI brain with contrast noted. Defer further repeat imaging to neurology unless mental status changes  · SBP < 140, may use Cardene as needed.  Currently off. Norvasc added  with PRN hydralazine  · Neurochecks   · Keppra 250 mg BID for seizure ppx  · PT/OT/SLP to eval and treat  · Likely rehab   · Unable to clinically determine traumatic or non-traumatic.     · Left MCI infarct, small  ? No Antiplatelets with bleeding. Statin Listed on allergy list.   ? Neurology/NIV following As above    · Cerebral aneurysm, right A1A2 junction, unruptured  ? NIS following as above  ? Will need outpatient follow-up     Right parotid mass  ? ENT consulted for further eval  ? Utrasound, guided FNA done on 8/13  ? Outpatient ENT follow up for Path and further mx     Mild Acute renal insufficiency, likely prerenal, improving   ?  Gentle IVF added with LR 75 ml/hr  ? Follow Cr. Minimize nephrotoxins  ?    · Hypertension  · Norvasc added as above. PRN hydralazine  · Dementia  · Continue memantine and donepezil  · Diabetes  · Accu-Cheks and sliding scale insulin  Code status: full   DVT prophylaxis: scds     Care Plan discussed with: Patient/Family  Disposition: [facility]     ADDITIONAL CARE RECOMMENDATIONS:   Follow up with PCP, Neuro/ NIS and ENT as noted above. · It is important that you take the medication exactly as they are prescribed. · Keep your medication in the bottles provided by the pharmacist and keep a list of the medication names, dosages, and times to be taken in your wallet. · Do not take other medications without consulting your doctor. · No drinking alcohol or driving car or operating machinery if you are on narcotic pain medications. Donot take sedating mediations if you are sleepy or confused. · Fall Precautions  · Keep Well Hydrated  · Report to your medical provider if you feel you have  developed allergies to medications  · Follow up with your PCP or Consultant for medication adjustments and refills  · Monitor for signs of fevers,chills,bleeding,chest pain and seek medical attention if you do so.     PENDING TEST RESULTS:   At the time of discharge the following test results are still pending: R parotid FNA biopsy results    FOLLOW UP APPOINTMENTS:    Follow-up Information     Follow up With Specialties Details Why Contact Info    Maegan Carrera MD Internal Medicine In 1 week As needed, If symptoms worsen on discharge from rehab [address] [facility]  Sherice Mcfarland 99 [facility]      Joey Linares MD Otolaryngology  Please call to schedule the follow up appointment with Dr. Shanel Dockery for R parotid mass and biopsy [address] [place] [address] MD [facility] In 2 weeks For follow up with neurointerventional surgery team in 2 weeks with repeat CT scan of head for anuerysm. Please call to schedule the appointment. [address] [place] [address]  [phone]                  DIET: Cardiac Diet and Diabetic Diet     ACTIVITY: Activity as tolerated and PT/OT Eval and Treat     WOUND CARE: as per wound care team     EQUIPMENT needed: as appropriate      DISCHARGE MEDICATIONS:  Current Discharge Medication List      START taking these medications    Details   acetaminophen (TYLENOL) 325 mg tablet Take 2 Tabs by mouth every six (6) hours as needed for Pain or Fever. Qty: 30 Tab, Refills: 0      amLODIPine (NORVASC) 5 mg tablet Take 1 Tab by mouth daily. Qty: 30 Tab, Refills: 0      balsam peru-castor oiL (VENELEX) ointment Apply  to affected area three (3) times daily. APPLY TO sacrum and buttocks    Nursing, document site in comments  Qty: 1 Tube, Refills: 0      glucose 4 gram chewable tablet Take 4 Tabs by mouth as needed (for low blood sugar less than 70). Qty: 30 Tab, Refills: 0      hydrocortisone (HYCORT) 1 % ointment Apply  to affected area two (2) times a day. use thin layer  Qty: 30 g, Refills: 0      levETIRAcetam (Keppra) 250 mg tablet Take 1 Tab by mouth two (2) times a day. Qty: 60 Tab, Refills: 0      polyethylene glycol (MIRALAX) 17 gram packet Take 1 Packet by mouth daily as needed for Constipation. First line therapy for constipation  Qty: 10 Each, Refills: 0         CONTINUE these medications which have NOT CHANGED    Details   memantine (Namenda) 5 mg tablet Take 1 Tab by mouth two (2) times a day. Qty: 60 Tab, Refills: 5      donepeziL (ARICEPT) 10 mg tablet TAKE ONE TABLET BY MOUTH DAILY  Qty: 90 Tab, Refills: 2      insulin lispro (HUMALOG) 100 unit/mL injection 4 Units by SubCUTAneous route as needed.          STOP taking these medications       lisinopril (PRINIVIL, ZESTRIL) 20 mg tablet Comments:   Reason for Stopping:         insulin glargine (LANTUS) 100 unit/mL injection Comments:   Reason for Stopping:                 NOTIFY YOUR PHYSICIAN FOR ANY OF THE FOLLOWING:   Fever over 101 degrees for 24 hours. Chest pain, shortness of breath, fever, chills, nausea, vomiting, diarrhea, change in mentation, falling, weakness, bleeding. Severe pain or pain not relieved by medications. Or, any other signs or symptoms that you may have questions about. DISPOSITION:    Home With:   OT  PT  HH  RN      x Long term SNF/Inpatient Rehab    Independent/assisted living    Hospice    Other:       PATIENT CONDITION AT DISCHARGE:     Functional status    Poor    x Deconditioned     Independent      Cognition   x  Lucid     Forgetful     Dementia      Catheters/lines (plus indication)    Ureña     PICC     PEG    x None      Code status    x Full code     DNR      PHYSICAL EXAMINATION AT DISCHARGE:    Visit Vitals  /44 (BP 1 Location: Right arm, BP Patient Position: At rest)   Pulse (!) 53   Temp 97.7 °F (36.5 °C)   Resp 17   Ht 5' 10\" (1.778 m)   Wt 86.7 kg (191 lb 2.2 oz)   SpO2 92%   BMI 27.43 kg/m²       Constitutional:  No acute distress,    ENT:  Oral mucous moist, oropharynx benign. Neck supple,    Resp:  CTA bilaterally. No wheezing/rhonchi/rales. No accessory muscle use   CV:  Regular rhythm, normal rate, no murmurs, gallops, rubs    GI:  Soft, non distended, non tender.  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  ICD-9-CM: 437.3  8/8/2020 - Present        [facility]intracerebral hemorrhage) ([facility] ICD-10-CM: I61.9  ICD-9-CM: 567  8/6/2020 - Present        Generalized anxiety disorder ICD-10-CM: F41.1  ICD-9-CM: 300.02  7/2/2014 - Present        Major depressive disorder, single episode, unspecified ICD-10-CM: F32.9  ICD-9-CM: 296.20 7/2/2014 - Present        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  ICD-9-CM: 799.59  7/23/2013 - Present        Anxiety ICD-10-CM: F41.9  ICD-9-CM: 300.00  5/29/2013 - Present        Depression ICD-10-CM: F32.9  ICD-9-CM: 636  5/29/2013 - Present        MCI (mild cognitive impairment) ICD-10-CM: G31.84  ICD-9-CM: 331.83  5/29/2013 - Present        TIA (transient ischemic attack) ICD-10-CM: G45.9  ICD-9-CM: 435.9  5/18/2013 - Present        Hearing loss ICD-10-CM: H91.90  ICD-9-CM: 389.9  4/26/2013 - Present        Ringing in ears ICD-10-CM: H93.19  ICD-9-CM: 388.30  4/26/2013 - Present        Joint pain ICD-10-CM: M25.50  ICD-9-CM: 719.40  4/26/2013 - Present        Falls ICD-10-CM: W19. Fransisca Kamini  ICD-9-CM: E888.9  4/26/2013 - Present            Mri Orb Face Neck W Wo Cont    Result Date: 8/9/2020  IMPRESSION: Nonspecific mass in the right parotid gland. Differential includes benign and malignant parotid lesions including a Warthin's tumor or benign mixed tumor. Lymph node is thought less likely other incidental findings as above    Mra Brain Wo Cont    Result Date: 8/7/2020  IMPRESSION: MRI Brain: 1. Stable right parietal intraparenchymal hemorrhage with mild surrounding edema, but no significant mass effect or midline shift. 2. Punctate acute infarct in the left inferior parietal lobe. 3. Chronic hemorrhages with associated encephalomalacia in the bilateral parieto-occipital lobes, right larger than left. Superficial siderosis from remote hemorrhage within the right parieto-occipital and temporal lobes. 4. Marked generalized parenchymal volume loss and severe chronic microvascular ischemic disease. Small chronic infarcts in the bilateral cerebellum. MRA Head: 1. No evidence of significant stenosis. No evidence of vascular malformation. 2. A 3 mm aneurysm versus infundibulum projecting medially at the right A1-A2 junction. MRA Neck: 1. No evidence of flow-limiting stenosis.  2. Incompletely evaluated mass in the right parotid tail measuring 1.8 x 1.3 cm. Recommend CT neck for further evaluation. 23X     Mra Neck Wo Cont    Result Date: 8/7/2020  IMPRESSION: MRI Brain: 1. Stable right parietal intraparenchymal hemorrhage with mild surrounding edema, but no significant mass effect or midline shift. 2. Punctate acute infarct in the left inferior parietal lobe. 3. Chronic hemorrhages with associated encephalomalacia in the bilateral parieto-occipital lobes, right larger than left. Superficial siderosis from remote hemorrhage within the right parieto-occipital and temporal lobes. 4. Marked generalized parenchymal volume loss and severe chronic microvascular ischemic disease. Small chronic infarcts in the bilateral cerebellum. MRA Head: 1. No evidence of significant stenosis. No evidence of vascular malformation. 2. A 3 mm aneurysm versus infundibulum projecting medially at the right A1-A2 junction. MRA Neck: 1. No evidence of flow-limiting stenosis. 2. Incompletely evaluated mass in the right parotid tail measuring 1.8 x 1.3 cm. Recommend CT neck for further evaluation. 23X     Mri Brain Wo Cont    Result Date: 8/7/2020  IMPRESSION: MRI Brain: 1. Stable right parietal intraparenchymal hemorrhage with mild surrounding edema, but no significant mass effect or midline shift. 2. Punctate acute infarct in the left inferior parietal lobe. 3. Chronic hemorrhages with associated encephalomalacia in the bilateral parieto-occipital lobes, right larger than left. Superficial siderosis from remote hemorrhage within the right parieto-occipital and temporal lobes. 4. Marked generalized parenchymal volume loss and severe chronic microvascular ischemic disease. Small chronic infarcts in the bilateral cerebellum. MRA Head: 1. No evidence of significant stenosis. No evidence of vascular malformation. 2. A 3 mm aneurysm versus infundibulum projecting medially at the right A1-A2 junction. MRA Neck: 1. No evidence of flow-limiting stenosis.  2. Incompletely evaluated mass in the right parotid tail measuring 1.8 x 1.3 cm. Recommend CT neck for further evaluation. 23X     Mri Brain W Cont    Result Date: 8/9/2020  IMPRESSION: Nonspecific mass in the right parotid gland. Differential includes benign and malignant parotid lesions including a Warthin's tumor or benign mixed tumor. Lymph node is thought less likely other incidental findings as above    Ct Head Wo Cont    Result Date: 8/10/2020  IMPRESSION: Stable to slight decreased size of right parietal intraparenchymal hemorrhage. Ct Head Wo Cont    Result Date: 8/6/2020  IMPRESSION: Acute right parietal hemorrhage at the vertex. The findings were called to Dr. Nick Cooper on 8/6/2020 at [address] [place] [address] by myself. [phone]     Us Guide Fine Ndl Asp W Image    Result Date: 8/13/2020  IMPRESSION: Successful ultrasound guided FNA and core biopsy of a right parotid mass. Pathology pending    Xr Chest Port    Result Date: 8/8/2020  IMPRESSION: No acute process.      Recent Results (from the past 24 hour(s))   GLUCOSE, POC    Collection Time: 08/13/20  5:55 PM   Result Value Ref Range    Glucose (POC) 168 (H) 65 - 100 mg/dL    Performed by Estefani Reyes RN    SARS-COV-2    Collection Time: 08/13/20  6:06 PM   Result Value Ref Range    Specimen source Nasopharyngeal      SARS-CoV-2 Not detected NOTD      Specimen source Nasopharyngeal      Specimen type NP Swab      Health status Symptomatic Testing     GLUCOSE, POC    Collection Time: 08/13/20 11:39 PM   Result Value Ref Range    Glucose (POC) 141 (H) 65 - 100 mg/dL    Performed by [address] [place] [address]    Collection Time: 08/14/20  2:13 AM   Result Value Ref Range    Magnesium 2.0 1.6 - 2.4 mg/dL   PHOSPHORUS    Collection Time: 08/14/20  2:13 AM   Result Value Ref Range    Phosphorus 3.4 2.6 - 4.7 MG/DL   PROTHROMBIN TIME + INR    Collection Time: 08/14/20  2:13 AM   Result Value Ref Range    INR 1.1 0.9 - 1.1      Prothrombin time 10.9 9.0 - 11.1 sec   CBC WITH AUTOMATED DIFF Collection Time: 08/14/20  2:13 AM   Result Value Ref Range    WBC 7.7 4.1 - 11.1 K/uL    RBC 3.58 (L) 4.10 - 5.70 M/uL    HGB 11.4 (L) 12.1 - 17.0 g/dL    HCT 35.0 (L) 36.6 - 50.3 %    MCV 97.8 80.0 - 99.0 FL    MCH 31.8 26.0 - 34.0 PG    MCHC 32.6 30.0 - 36.5 g/dL    RDW 11.9 11.5 - 14.5 %    PLATELET 332 299 - 996 K/uL    MPV 9.4 8.9 - 12.9 FL    NRBC 0.0 0  WBC    ABSOLUTE NRBC 0.00 0.00 - 0.01 K/uL    NEUTROPHILS 61 32 - 75 %    LYMPHOCYTES 25 12 - 49 %    MONOCYTES 8 5 - 13 %    EOSINOPHILS 6 0 - 7 %    BASOPHILS 0 0 - 1 %    IMMATURE GRANULOCYTES 0 0.0 - 0.5 %    ABS. NEUTROPHILS 4.7 1.8 - 8.0 K/UL    ABS. LYMPHOCYTES 1.9 0.8 - 3.5 K/UL    ABS. MONOCYTES 0.6 0.0 - 1.0 K/UL    ABS. EOSINOPHILS 0.5 (H) 0.0 - 0.4 K/UL    ABS. BASOPHILS 0.0 0.0 - 0.1 K/UL    ABS. IMM.  GRANS. 0.0 0.00 - 0.04 K/UL    DF AUTOMATED     METABOLIC PANEL, BASIC    Collection Time: 08/14/20  2:13 AM   Result Value Ref Range    Sodium 141 136 - 145 mmol/L    Potassium 3.8 3.5 - 5.1 mmol/L    Chloride 109 (H) 97 - 108 mmol/L    CO2 24 21 - 32 mmol/L    Anion gap 8 5 - 15 mmol/L    Glucose 102 (H) 65 - 100 mg/dL    BUN 24 (H) 6 - 20 MG/DL    Creatinine 1.22 0.70 - 1.30 MG/DL    BUN/Creatinine ratio 20 12 - 20      GFR est AA >60 >60 ml/min/1.73m2    GFR est non-AA 57 (L) >60 ml/min/1.73m2    Calcium 8.1 (L) 8.5 - 10.1 MG/DL   GLUCOSE, POC    Collection Time: 08/14/20  6:44 AM   Result Value Ref Range    Glucose (POC) 123 (H) 65 - 100 mg/dL    Performed by [id number], POC    Collection Time: 08/14/20 11:44 AM   Result Value Ref Range    Glucose (POC) 170 (H) 65 - 100 mg/dL    Performed by Eleazar Mccall (CON)        Greater than 45 minutes were spent with the patient on counseling and coordination of care    Signed:   Tom Dorantes MD  8/14/2020  1:46 PM

## 2020-08-14 NOTE — DISCHARGE INSTRUCTIONS
Discharge Instructions       PATIENT ID: Sabrina Matias  MRN: [id number]   YOB: 1940    DATE OF ADMISSION: 8/6/2020 11:14 PM    DATE OF DISCHARGE: 8/14/2020    PRIMARY CARE PROVIDER: Caitie Murray MD     ATTENDING PHYSICIAN: Karen Owens MD  DISCHARGING PROVIDER: Vani Strickland MD    To contact this individual call [phone] and ask the  to page. If unavailable ask to be transferred the Adult Hospitalist Department. DISCHARGE DIAGNOSES   # Acute R parietal hemorrhage, MRI with stable right parietal intraparenchymal hemorrhage with surrounding edema but no mass-effect or midline shift. # Left small MCI infarcts. No Antiplatelets with bleeding. Statin Listed on allergy list.   # Cerebral aneurysm, right A1-A2 junction, unruptured  # Right parotid mass, FNA done on 8/13.  F/up with ENT outpatient for further mx and Biopsy results  # Mild TAMEKA, prerenal. Better  # Hypertension  # Dementia  # Diabetes    CONSULTATIONS: IP CONSULT TO NEUROSURGERY  IP CONSULT TO NEUROLOGY  IP CONSULT TO NEUROINTERVENTIONAL SURGERY  IP CONSULT TO NEUROINTERVENTIONAL SURGERY  IP CONSULT TO OTOLARYNGOLOGY  IP CONSULT TO HOSPITALIST  IP CONSULT TO CARDIOLOGY    PROCEDURES/SURGERIES: * No surgery found *    PENDING TEST RESULTS:   At the time of discharge the following test results are still pending: R parotid biopsy    FOLLOW UP APPOINTMENTS:   Follow-up Information     Follow up With Specialties Details Why Contact Yayo Murray MD Internal Medicine In 1 week As needed, If symptoms worsen on discharge from rehab [address] [facility]  [place] [address]      Haley Monahan MD Otolaryngology  Please call to schedule the follow up appointment with Dr. Charly Veras for R parotid mass and biopsy [phone]       Larissa Song MD Endovascular Surgical Neuroradiology In 2 weeks For follow up with neurointerventional surgery team in 2 weeks with repeat CT scan of head for anuerysm. Please call to schedule the appointment. [address]  [phone]             ADDITIONAL CARE RECOMMENDATIONS:   Follow up with PCP, Neuro/ NIS and ENT as noted above. · It is important that you take the medication exactly as they are prescribed. · Keep your medication in the bottles provided by the pharmacist and keep a list of the medication names, dosages, and times to be taken in your wallet. · Do not take other medications without consulting your doctor. · No drinking alcohol or driving car or operating machinery if you are on narcotic pain medications. Donot take sedating mediations if you are sleepy or confused. · Fall Precautions  · Keep Well Hydrated  · Report to your medical provider if you feel you have  developed allergies to medications  · Follow up with your PCP or Consultant for medication adjustments and refills  · Monitor for signs of fevers,chills,bleeding,chest pain and seek medical attention if you do so. DIET: Cardiac Diet and Diabetic Diet    ACTIVITY: Activity as tolerated and PT/OT Eval and Treat    WOUND CARE: as per wound care team    EQUIPMENT needed: as appropriate      DISCHARGE MEDICATIONS:   See Medication Reconciliation Form    · It is important that you take the medication exactly as they are prescribed. · Keep your medication in the bottles provided by the pharmacist and keep a list of the medication names, dosages, and times to be taken in your wallet. · Do not take other medications without consulting your doctor. NOTIFY YOUR PHYSICIAN FOR ANY OF THE FOLLOWING:   Fever over 101 degrees for 24 hours. Chest pain, shortness of breath, fever, chills, nausea, vomiting, diarrhea, change in mentation, falling, weakness, bleeding. Severe pain or pain not relieved by medications.   Or, any other signs or symptoms that you may have questions about.      DISPOSITION:    Home With:   OT  PT  HH  RN      x SNF/Inpatient Rehab/LTAC    Independent/assisted living    Hospice    Other:     CDMP Checked:   Yes x     PROBLEM LIST Updated:  Yes x        My Medications      START taking these medications      Instructions Each Dose to Equal Morning Noon Evening Bedtime   acetaminophen 325 mg tablet  Commonly known as:  TYLENOL    Your last dose was: Your next dose is: Take 2 Tabs by mouth every six (6) hours as needed for Pain or Fever. 650 mg                 amLODIPine 5 mg tablet  Commonly known as:  NORVASC  Start taking on:  August 15, 2020    Your last dose was: Your next dose is: Take 1 Tab by mouth daily. 5 mg                 balsam peru-castor oiL ointment  Commonly known as:  VENELEX    Your last dose was: Your next dose is:          Apply  to affected area three (3) times daily. APPLY TO sacrum and buttocks    Nursing, document site in comments                  glucose 4 gram chewable tablet    Your last dose was: Your next dose is: Take 4 Tabs by mouth as needed (for low blood sugar less than 70). 4 Tab                 hydrocortisone 1 % ointment  Commonly known as:  HYCORT    Your last dose was: Your next dose is:          Apply  to affected area two (2) times a day. use thin layer                  levETIRAcetam 250 mg tablet  Commonly known as:  Keppra    Your last dose was: Your next dose is: Take 1 Tab by mouth two (2) times a day. 250 mg                 polyethylene glycol 17 gram packet  Commonly known as:  MIRALAX    Your last dose was: Your next dose is: Take 1 Packet by mouth daily as needed for Constipation.  First line therapy for constipation   17 g                    CHANGE how you take these medications      Instructions Each Dose to Equal Morning Noon Evening Bedtime   donepeziL 10 mg tablet  Commonly known as:  ARICEPT  What changed:  See the new instructions. Your last dose was: Your next dose is:          TAKE ONE TABLET BY MOUTH DAILY                     CONTINUE taking these medications      Instructions Each Dose to Equal Morning Noon Evening Bedtime   HumaLOG U-100 Insulin 100 unit/mL injection  Generic drug:  insulin lispro    Your last dose was: Your next dose is:          4 Units by SubCUTAneous route as needed. 4 Units                 memantine 5 mg tablet  Commonly known as:  Namenda    Your last dose was: Your next dose is: Take 1 Tab by mouth two (2) times a day. 5 mg                    STOP taking these medications    Lantus U-100 Insulin 100 unit/mL injection  Generic drug:  insulin glargine        lisinopriL 20 mg tablet  Commonly known as:  Iain Wright              Where to Get Your Medications      These medications were sent to [facility] [address], [address] [place] [address]    Phone:  [phone]   · hydrocortisone 1 % ointment     Information on where to get these meds will be given to you by the nurse or doctor.     Ask your nurse or doctor about these medications  · acetaminophen 325 mg tablet  · amLODIPine 5 mg tablet  · balsam peru-castor oiL ointment  · glucose 4 gram chewable tablet  · levETIRAcetam 250 mg tablet  · polyethylene glycol 17 gram packet         Signed:   Jose R Messina MD  8/14/2020  1:33 PM

## 2020-09-04 ENCOUNTER — TELEPHONE (OUTPATIENT)
Dept: NEUROSURGERY | Age: 80
End: 2020-09-04

## 2020-09-04 NOTE — TELEPHONE ENCOUNTER
Patients wife called in stating that patient is just getting out of rehab. Patient will need an order for a CT entered so it can be completed before following up in clinic.

## 2020-09-21 DIAGNOSIS — F02.818 LATE ONSET ALZHEIMER'S DISEASE WITH BEHAVIORAL DISTURBANCE (HCC): Primary | ICD-10-CM

## 2020-09-21 DIAGNOSIS — I61.1 NONTRAUMATIC CORTICAL HEMORRHAGE OF RIGHT CEREBRAL HEMISPHERE (HCC): ICD-10-CM

## 2020-09-21 DIAGNOSIS — G30.1 LATE ONSET ALZHEIMER'S DISEASE WITH BEHAVIORAL DISTURBANCE (HCC): Primary | ICD-10-CM

## 2020-09-21 RX ORDER — LEVETIRACETAM 250 MG/1
250 TABLET ORAL 2 TIMES DAILY
Qty: 60 TAB | Refills: 0 | Status: SHIPPED | OUTPATIENT
Start: 2020-09-21 | End: 2020-11-01

## 2020-09-21 RX ORDER — MEMANTINE HYDROCHLORIDE 5 MG/1
5 TABLET ORAL 2 TIMES DAILY
Qty: 60 TAB | Refills: 0 | Status: SHIPPED | OUTPATIENT
Start: 2020-09-21 | End: 2021-03-29

## 2020-09-21 RX ORDER — DONEPEZIL HYDROCHLORIDE 5 MG/1
5 TABLET, FILM COATED ORAL
Qty: 30 TAB | Refills: 0 | Status: SHIPPED | OUTPATIENT
Start: 2020-09-21 | End: 2020-10-21

## 2020-09-21 NOTE — TELEPHONE ENCOUNTER
Sent refills to pharmacy on file. Patient is already scheduled for a f/u appt with Dr. Elieser King.

## 2020-09-21 NOTE — TELEPHONE ENCOUNTER
Wife calling about pt was in ED 3 different hosp.  This is his 4th stroke and need refills on Aricept 10mg and Keppra 250mg 2x miller and Namenda 5 mg please advise

## 2020-09-30 ENCOUNTER — OFFICE VISIT (OUTPATIENT)
Dept: NEUROLOGY | Age: 80
End: 2020-09-30
Payer: MEDICARE

## 2020-09-30 VITALS
WEIGHT: 189 LBS | RESPIRATION RATE: 20 BRPM | HEART RATE: 60 BPM | DIASTOLIC BLOOD PRESSURE: 64 MMHG | OXYGEN SATURATION: 96 % | SYSTOLIC BLOOD PRESSURE: 122 MMHG | BODY MASS INDEX: 27.12 KG/M2

## 2020-09-30 DIAGNOSIS — F02.818 LATE ONSET ALZHEIMER'S DISEASE WITH BEHAVIORAL DISTURBANCE (HCC): Primary | ICD-10-CM

## 2020-09-30 DIAGNOSIS — I61.1 NONTRAUMATIC CORTICAL HEMORRHAGE OF RIGHT CEREBRAL HEMISPHERE (HCC): ICD-10-CM

## 2020-09-30 DIAGNOSIS — G30.1 LATE ONSET ALZHEIMER'S DISEASE WITH BEHAVIORAL DISTURBANCE (HCC): Primary | ICD-10-CM

## 2020-09-30 DIAGNOSIS — R26.9 GAIT ABNORMALITY: ICD-10-CM

## 2020-09-30 PROCEDURE — G9717 DOC PT DX DEP/BP F/U NT REQ: HCPCS | Performed by: NURSE PRACTITIONER

## 2020-09-30 PROCEDURE — G8427 DOCREV CUR MEDS BY ELIG CLIN: HCPCS | Performed by: NURSE PRACTITIONER

## 2020-09-30 PROCEDURE — G8536 NO DOC ELDER MAL SCRN: HCPCS | Performed by: NURSE PRACTITIONER

## 2020-09-30 PROCEDURE — 3288F FALL RISK ASSESSMENT DOCD: CPT | Performed by: NURSE PRACTITIONER

## 2020-09-30 PROCEDURE — 99215 OFFICE O/P EST HI 40 MIN: CPT | Performed by: NURSE PRACTITIONER

## 2020-09-30 PROCEDURE — G8419 CALC BMI OUT NRM PARAM NOF/U: HCPCS | Performed by: NURSE PRACTITIONER

## 2020-09-30 PROCEDURE — 1100F PTFALLS ASSESS-DOCD GE2>/YR: CPT | Performed by: NURSE PRACTITIONER

## 2020-09-30 RX ORDER — LISINOPRIL 20 MG/1
TABLET ORAL
COMMUNITY

## 2020-09-30 NOTE — PROGRESS NOTES
Last hospital stay was August/September for stroke added a seizure medication     He is doing home physical therapy 2 times a week and then switching to pivot soon

## 2020-10-01 ENCOUNTER — TELEPHONE (OUTPATIENT)
Dept: NEUROLOGY | Age: 80
End: 2020-10-01

## 2020-10-01 NOTE — TELEPHONE ENCOUNTER
Teri dozier nurse from [facility] is calling to get a verbal order to begin outpatient therapy next week.

## 2020-10-27 ENCOUNTER — TELEPHONE (OUTPATIENT)
Dept: NEUROLOGY | Age: 80
End: 2020-10-27

## 2020-10-27 DIAGNOSIS — R93.0 ABNORMAL HEAD CT: Primary | ICD-10-CM

## 2020-10-27 NOTE — TELEPHONE ENCOUNTER
Patient last saw Melissa Williamson but she is out. He was supposed to see Dr. Berenice Meigs to follow up with him after his aneurysm but has been unable to reach their office. His wife wants to know if Dr. Micheline Escobedo has any info for the Dr and if there is any medication that would help for his dizziness.

## 2020-10-28 NOTE — TELEPHONE ENCOUNTER
Spoke to spouse this morning to schedule an appointment for patient. Spouse was contacted 9/4/2020 by this office. Informed spouse that patient needed to have imaging done prior to his appointment with Neurointerventional Surgery. Imaging has not been done as of today. Informed spouse to have imaging done prior to his appointment with NIS on 11/6/2020. Informed spouse to have Dr Alban Phillips' office enter order as this office cannot. Advised spouse to contact office today for this order. Spouse stated understanding.

## 2020-10-30 ENCOUNTER — HOSPITAL ENCOUNTER (OUTPATIENT)
Dept: CT IMAGING | Age: 80
Discharge: HOME | End: 2020-10-30
Attending: PSYCHIATRY & NEUROLOGY
Payer: MEDICARE

## 2020-10-30 DIAGNOSIS — R93.0 ABNORMAL HEAD CT: ICD-10-CM

## 2020-10-30 PROCEDURE — 70450 CT HEAD/BRAIN W/O DYE: CPT

## 2020-10-31 DIAGNOSIS — F02.818 LATE ONSET ALZHEIMER'S DISEASE WITH BEHAVIORAL DISTURBANCE (HCC): ICD-10-CM

## 2020-10-31 DIAGNOSIS — G30.1 LATE ONSET ALZHEIMER'S DISEASE WITH BEHAVIORAL DISTURBANCE (HCC): ICD-10-CM

## 2020-10-31 DIAGNOSIS — I61.1 NONTRAUMATIC CORTICAL HEMORRHAGE OF RIGHT CEREBRAL HEMISPHERE (HCC): ICD-10-CM

## 2020-11-01 RX ORDER — LEVETIRACETAM 250 MG/1
TABLET ORAL
Qty: 60 TAB | Refills: 0 | Status: SHIPPED | OUTPATIENT
Start: 2020-11-01

## 2020-11-06 ENCOUNTER — OFFICE VISIT (OUTPATIENT)
Dept: NEUROSURGERY | Age: 80
End: 2020-11-06
Payer: MEDICARE

## 2020-11-06 DIAGNOSIS — I61.0 NONTRAUMATIC SUBCORTICAL HEMORRHAGE OF RIGHT CEREBRAL HEMISPHERE (HCC): Primary | ICD-10-CM

## 2020-11-06 PROBLEM — J44.9 COPD (CHRONIC OBSTRUCTIVE PULMONARY DISEASE) (HCC): Status: ACTIVE | Noted: 2020-11-06

## 2020-11-06 PROCEDURE — 99214 OFFICE O/P EST MOD 30 MIN: CPT | Performed by: STUDENT IN AN ORGANIZED HEALTH CARE EDUCATION/TRAINING PROGRAM

## 2020-11-06 RX ORDER — DONEPEZIL HYDROCHLORIDE 10 MG/1
10 TABLET, FILM COATED ORAL
COMMUNITY
End: 2020-11-30 | Stop reason: SDUPTHER

## 2020-11-06 RX ORDER — INSULIN GLARGINE 100 [IU]/ML
INJECTION, SOLUTION SUBCUTANEOUS
COMMUNITY

## 2020-11-09 NOTE — PROGRESS NOTES
NeuroInterventional Surgery Note  Kalina Richards MD    Patient: Marybeth Jimenez MRN: [id number]  SSN: [id number]    YOB: 1940  Age: 67 y.o. Sex: male      Chief Complaint:    Subjective:      Marybeth Jimenez is a 67 y.o. male who is being seen for incidental finding of an Acomm aneurysm. Past Medical History:   Diagnosis Date    Anxiety disorder     Constipation     Contact dermatitis and eczema due to cause     COPD (chronic obstructive pulmonary disease) ([facility] 11/06/2020    Dementia ([facility]     Depression     Diabetes ([facility]     Falls 4/26/2013    Hearing loss 4/26/2013    Hemorrhagic stroke ([facility] [facility] 02/11/2019    Hypertension     Memory loss 4/26/2013    Neuropathy     Ringing in ears 4/26/2013    Stroke ([facility]     Vision decreased      Family History   Problem Relation Age of Onset    Dementia Maternal Grandfather     No Known Problems Mother     No Known Problems Father      Social History     Tobacco Use    Smoking status: Current Every Day Smoker    Smokeless tobacco: Never Used   Substance Use Topics    Alcohol use: No      Cannot display prior to admission medications because the patient has not been admitted in this contact. Allergies   Allergen Reactions    Bactrim [Sulfamethoprim] Unknown (comments)    Dilantin [Phenytoin Sodium Extended] Unknown (comments)    Doxycycline Unknown (comments)    Lipitor [Atorvastatin] Myalgia    Pravastatin Myalgia    Sertraline Unknown (comments)    Sulfur Diarrhea    Tegretol [Carbamazepine] Unknown (comments)    Zocor [Simvastatin] Myalgia       Review of Systems:  A comprehensive review of systems was negative except for that written in the History of Present Illness. Denies numbness, tingling, chest pain, leg pain, nausea, vomiting, difficulty swallowing, headache, and dyspnea. Objective: There were no vitals filed for this visit.    Physical Exam:  GENERAL: Calm, cooperative, NAD  SKIN: Warm, dry, color appropriate for ethnicity. Groin site soft, with no odor, bleeding or drainage noted. Mild ecchymosis present. Neurologic Exam:  Mental Status:  Alert and oriented x 4. Appropriate affect, mood and behavior. Language:    Normal fluency, repetition, comprehension and naming. Cranial Nerves:   Pupils 3 mm, equal, round and reactive to light. Visual fields full to confrontation. Extraocular movements intact. Facial sensation intact. Full facial strength, no asymmetry. Hearing grossly intact bilaterally. No dysarthria. Tongue protrudes to midline, palate elevates symmetrically. Shoulder shrug 5/5 bilaterally. Motor:    Left ronator drift. Bulk and tone normal.      5/5 power in all extremities proximally and distally. No involuntary movements. Sensation:    Sensation intact throughout to light touch    Reflexes:    Reflexes are 2+ at the biceps, triceps, patella     Coordination & Gait: Normal. FTN and HTS intact with no ataxia present. Labs:  Lab Results   Component Value Date/Time    WBC 7.7 08/14/2020 02:13 AM    HGB 11.4 (L) 08/14/2020 02:13 AM    HCT 35.0 (L) 08/14/2020 02:13 AM    PLATELET 576 48/26/6799 02:13 AM    MCV 97.8 08/14/2020 02:13 AM      Lab Results   Component Value Date/Time    Sodium 141 08/14/2020 02:13 AM    Potassium 3.8 08/14/2020 02:13 AM    Chloride 109 (H) 08/14/2020 02:13 AM    CO2 24 08/14/2020 02:13 AM    Anion gap 8 08/14/2020 02:13 AM    Glucose 102 (H) 08/14/2020 02:13 AM    BUN 24 (H) 08/14/2020 02:13 AM    Creatinine 1.22 08/14/2020 02:13 AM    BUN/Creatinine ratio 20 08/14/2020 02:13 AM    GFR est AA >60 08/14/2020 02:13 AM    GFR est non-AA 57 (L) 08/14/2020 02:13 AM    Calcium 8.1 (L) 08/14/2020 02:13 AM     No results found for: CPK, RCK1, RCK2, RCK3, RCK4, CKMB, CKNDX, CKND1, TROPT, TROIQ, BNPP, BNP    Imaging:      CT Results (maximum last 3):   Results from [place] encounter on 10/30/20   CT HEAD WO CONT    Narrative EXAM: CT HEAD WO CONT    INDICATION: Possible history of aneurysm. COMPARISON: 8/10/2020. CONTRAST: None. TECHNIQUE: Unenhanced CT of the head was performed using 5 mm images. Brain and  bone windows were generated. Coronal and sagittal reformats. CT dose reduction  was achieved through use of a standardized protocol tailored for this  examination and automatic exposure control for dose modulation. FINDINGS:  Right occipital craniotomy changes. . Extensive subcortical deep white matter  hypodensities. Cerebral volume loss. Multifocal subtle malacia in the right  occipital lobe consistent with prior infarct. Ventricles are symmetric. [employer] There  is no intracranial hemorrhage, extra-axial collection, or mass effect. The  basilar cisterns are open. No CT evidence of acute infarct. The bone windows demonstrate no abnormalities. The visualized portions of the  paranasal sinuses and mastoid air cells are clear. Impression IMPRESSION:   No acute intracranial abnormality. Extensive chronic small vessel ischemic  disease with prior right occipital infarct. Results from [place] encounter on 08/06/20   CT HEAD WO CONT    Narrative EXAM: CT HEAD WO CONT    INDICATION: follow up ICH    COMPARISON: Head CT dated 8/7/2020. CONTRAST: None. TECHNIQUE: Unenhanced CT of the head was performed using 5 mm images. Brain and  bone windows were generated. Coronal and sagittal reformats. CT dose reduction  was achieved through use of a standardized protocol tailored for this  examination and automatic exposure control for dose modulation. FINDINGS:  Persistent but stable mild ventriculomegaly. . Encephalomalacia in the right  parietal lobe is unchanged. . Right parietal intraparenchymal hemorrhage measures  3.4 x 2.0 cm slightly decreased in size. Surrounding edema is not significantly  changed. . The basilar cisterns are open.  No CT evidence of acute infarct. The bone windows demonstrate right occipital craniotomy changes. . The visualized  portions of the paranasal sinuses and mastoid air cells are clear. Impression IMPRESSION:   Stable to slight decreased size of right parietal intraparenchymal hemorrhage. CT HEAD WO CONT    Narrative EXAM: CT HEAD WO CONT    INDICATION: evaluation of parietal hemorrhage    COMPARISON: None. CONTRAST: None. TECHNIQUE: Unenhanced CT of the head was performed using 5 mm images. Brain and  bone windows were generated. Coronal and sagittal reformats. CT dose reduction  was achieved through use of a standardized protocol tailored for this  examination and automatic exposure control for dose modulation. FINDINGS:  EXAM: CT HEAD WO CONT    INDICATION: left arm weakness and numbness    COMPARISON: 3/15/2019. CONTRAST: None. TECHNIQUE: Unenhanced CT of the head was performed using 5 mm images. Brain and  bone windows were generated. Coronal and sagittal reformats. CT dose reduction  was achieved through use of a standardized protocol tailored for this  examination and automatic exposure control for dose modulation. FINDINGS:  Extensive confluent periventricular hypodensity. Subacute partially layering right parietal hemorrhage at the vertex measuring  3.5 x 2.1 previously 2.4 x 3.6 cm. No intraventricular hemorrhage. The patient  is status post right occipital craniectomy cavernous carotid vascular  calcifications. Sulcal and ventricular prominence. IMPRESSION  Subacute right parietal hemorrhage is slightly diminished in size compared to  the prior examination. Severe chronic microvascular ischemic change and moderate to severe temporal  predominant cerebral atrophy. Atherosclerotic cerebral vasculopathy. Assessment:   Patient is doing well with some left sided weakness. No history of seizures. He was started on Keppra while hospitalized for a right parietooccipital hematoma.  No seizures during hospitalization. He was found to have an unruptured 3 mm aneurysm of the right A1/A2 junction on 8/7/20. He was discharged on 8/15/20. Patient refers that he has some personality changes, anger and frustration. Patient does not need to continue on Keppra. Will discontinue it. Plan:  Stop Keppra  Will obtain and MRA head with contrast on 2/7/21. Will call patient with results    Thank you for this consult and participating in the care of this patient.   Signed By: Celsa Alejandra MD     November 9, 2020

## 2020-11-11 ENCOUNTER — TELEPHONE (OUTPATIENT)
Dept: NEUROSURGERY | Age: 80
End: 2020-11-11

## 2020-11-11 NOTE — TELEPHONE ENCOUNTER
Spoke to spouse to inform her of provider's recommendations. MRA with contrast on 2/7/2021. No need for office f/u unless aneurysm grows in size or symptomatic. Provider will call patient with results in February.

## 2020-11-30 ENCOUNTER — TELEPHONE (OUTPATIENT)
Dept: NEUROLOGY | Age: 80
End: 2020-11-30

## 2020-11-30 DIAGNOSIS — G30.1 LATE ONSET ALZHEIMER'S DISEASE WITH BEHAVIORAL DISTURBANCE (HCC): Primary | ICD-10-CM

## 2020-11-30 DIAGNOSIS — F02.818 LATE ONSET ALZHEIMER'S DISEASE WITH BEHAVIORAL DISTURBANCE (HCC): Primary | ICD-10-CM

## 2020-11-30 RX ORDER — DONEPEZIL HYDROCHLORIDE 5 MG/1
5 TABLET, FILM COATED ORAL
Qty: 90 TAB | Refills: 1 | Status: SHIPPED | OUTPATIENT
Start: 2020-11-30 | End: 2021-05-27

## 2020-11-30 NOTE — TELEPHONE ENCOUNTER
Per Azra's note, pt is supposed to be on donepezil 5 mg and memantine 5 mg. Pt's wife states he was decreased to 5 mg while in the hospital at [facility]. Sent according to Azra's note.

## 2020-11-30 NOTE — TELEPHONE ENCOUNTER
----- Message from [employer]ne Lights sent at 11/30/2020  2:37 PM EST -----  Regarding: Dr Diony Gamble first and last name:Anca Messer, pts spouse      Reason for call:pt wife pharmacy was told to call Dr Chapin Kelley nurse regarding why her  rx was denied      Callback required yes/no and why:yes, for reason given above       Best contact number(s):[phone], pt wife gave permission to leave a message  also on why rx was denied. Details to clarify the request: Please call back as soon as possible to confirm what dosage he gets and why rx was denied.       Ken Lights

## 2021-01-01 NOTE — PROGRESS NOTES
Neurocritical Care Brief Progress Note:    27-year-old left-handed male with history of right parietal occipital hemorrhagic stroke, DM, memory loss and depression was transferred to [facility] ICU for a higher level of care after presenting to the [facility] ER with symptoms of left hand numbness, clumbsiness and weakness for several days. Patient stated that he fell earlier today but hit the left side of his head. On ER record, his wife stated that since his previous stroke he has left-sided visual field deficits and difficulty with balance and falls frequently. NIHSS:      1a-LOC:0    1b-Month/Age:1 Can state  but not age    1c-Open/Close Hand:0    2-Best Gaze:0    3-Visual Fields:0    4-Facial Palsy:0    5a-Left Arm:0, no drift for me but slightly weaker  strength and has had drift on previous exams    5b-Right Arm:0    6a-Left Le    6b-Right Le    7-Limb Ataxia:2, left arm and left leg    8-Sensory:1    9-Best Language:0    10-Dysarthria:0    11-Extinction/Inattention:0  TOTAL SCORE: 4    ICH score is 0      PLAN: 1) Intracerebral Hemorrhage, ICH score: 0   - CT head showed acute parietal hemorrhage at the vertex with a volume of 14.7 cc.    - SBP goal less than 140, Cardene/Labetalol PRN   - Keppra 250 BID for seizure ppx   - q1 neuro checks    -CTH in am.    - NSGY following     Full Neurology consult note to follow.       Martín Faye NP  Neurocritical Care Nurse Practitioner  [phone] Statement Selected

## 2021-01-06 ENCOUNTER — OFFICE VISIT (OUTPATIENT)
Dept: NEUROLOGY | Age: 81
End: 2021-01-06
Payer: MEDICARE

## 2021-01-06 VITALS
DIASTOLIC BLOOD PRESSURE: 64 MMHG | RESPIRATION RATE: 16 BRPM | OXYGEN SATURATION: 96 % | HEART RATE: 46 BPM | TEMPERATURE: 97.3 F | BODY MASS INDEX: 27.12 KG/M2 | SYSTOLIC BLOOD PRESSURE: 120 MMHG | WEIGHT: 189 LBS

## 2021-01-06 DIAGNOSIS — S09.90XA TRAUMATIC INJURY OF HEAD, INITIAL ENCOUNTER: Primary | ICD-10-CM

## 2021-01-06 DIAGNOSIS — I67.1 ANEURYSM, CEREBRAL, NONRUPTURED: ICD-10-CM

## 2021-01-06 DIAGNOSIS — R51.9 NEW ONSET OF HEADACHES AFTER AGE 50: ICD-10-CM

## 2021-01-06 DIAGNOSIS — R29.6 FALLS FREQUENTLY: ICD-10-CM

## 2021-01-06 DIAGNOSIS — Z86.73 HISTORY OF STROKE: ICD-10-CM

## 2021-01-06 PROCEDURE — G9717 DOC PT DX DEP/BP F/U NT REQ: HCPCS | Performed by: PSYCHIATRY & NEUROLOGY

## 2021-01-06 PROCEDURE — 3288F FALL RISK ASSESSMENT DOCD: CPT | Performed by: PSYCHIATRY & NEUROLOGY

## 2021-01-06 PROCEDURE — 99214 OFFICE O/P EST MOD 30 MIN: CPT | Performed by: PSYCHIATRY & NEUROLOGY

## 2021-01-06 PROCEDURE — 1100F PTFALLS ASSESS-DOCD GE2>/YR: CPT | Performed by: PSYCHIATRY & NEUROLOGY

## 2021-01-06 PROCEDURE — G8427 DOCREV CUR MEDS BY ELIG CLIN: HCPCS | Performed by: PSYCHIATRY & NEUROLOGY

## 2021-01-06 PROCEDURE — G8536 NO DOC ELDER MAL SCRN: HCPCS | Performed by: PSYCHIATRY & NEUROLOGY

## 2021-01-06 PROCEDURE — G8419 CALC BMI OUT NRM PARAM NOF/U: HCPCS | Performed by: PSYCHIATRY & NEUROLOGY

## 2021-01-06 NOTE — PATIENT INSTRUCTIONS
How to Get Up Safely After a Fall: Care Instructions Your Care Instructions If you have injuries, health problems, or other reasons that may make it easy for you to fall at home, it is a good idea to learn how to get up safely after a fall. Learning how to get up correctly can help you avoid making an injury worse. Also, knowing what to do if you cannot get up can help you stay safe until help arrives. Follow-up care is a key part of your treatment and safety. Be sure to make and go to all appointments, and call your doctor if you are having problems. It's also a good idea to know your test results and keep a list of the medicines you take. How can you care for yourself after a fall? If you think you can get up First lie still for a few minutes and think about how you feel. If your body feels okay and you think you can get up safely, follow the rest of the steps below: 1. Look for a chair or other piece of furniture that is close to you. 2. Roll onto your side and rest. Roll by turning your head in the direction you want to roll, move your shoulder and arm, then hip and leg in the same direction. 3. Lie still for a moment to let your blood pressure adjust. 
4. Slowly push your upper body up, lift your head, and take a moment to rest. 
5. Slowly get up on your hands and knees, and crawl to the chair or other stable piece of furniture. 6. Put your hands on the chair. 7. Move one foot forward, and place it flat on the floor. Your other leg should be bent with the knee on the floor. 8. Rise slowly, turn your body, and sit in the chair. Stay seated for a bit and think about how you feel. Call for help. Even if you feel okay, let someone know what happened to you. You might not know that you have a serious injury. If you cannot get up 1. If you think you are injured after a fall or you cannot get up, try not to panic. 2. Call out for help. 3. If you have a phone within reach or you have an emergency call device, use it to call for help. 4. If you do not have a phone within reach, try to slide yourself toward it. If you cannot get to the phone, try to slide toward a door or window or a place where you think you can be heard. 5. Craig or use an object to make noise so someone might hear you. 6. If you can reach something that you can use for a pillow, place it under your head. Try to stay warm by covering yourself with a blanket or clothing while you wait for help. When should you call for help? Call 911 anytime you think you may need emergency care. For example, call if: 
  · You passed out (lost consciousness).  
  · You cannot get up after a fall.  
  · You have severe pain. Call your doctor now or seek immediate medical care if: 
  · You have new or worse pain.  
  · You are dizzy or lightheaded.  
  · You hit your head. Watch closely for changes in your health, and be sure to contact your doctor if: 
  · You do not get better as expected. Where can you learn more? Go to http://www.gray.com/ Enter N170 in the search box to learn more about \"How to Get Up Safely After a Fall: Care Instructions. \" Current as of: April 15, 2020               Content Version: 12.6 © 1381-8509 [employer]. Care instructions adapted under license by [employer] (which disclaims liability or warranty for this information). If you have questions about a medical condition or this instruction, always ask your healthcare professional. [place] [address] any warranty or liability for your use of this information. Preventing Falls: Care Instructions Your Care Instructions Getting around your home safely can be a challenge if you have injuries or health problems that make it easy for you to fall. Loose rugs and furniture in walkways are among the dangers for many older people who have problems walking or who have poor eyesight. People who have conditions such as arthritis, osteoporosis, or dementia also have to be careful not to fall. You can make your home safer with a few simple measures. Follow-up care is a key part of your treatment and safety. Be sure to make and go to all appointments, and call your doctor if you are having problems. It's also a good idea to know your test results and keep a list of the medicines you take. How can you care for yourself at home? Taking care of yourself · You may get dizzy if you do not drink enough water. To prevent dehydration, drink plenty of fluids, enough so that your urine is light yellow or clear like water. Choose water and other caffeine-free clear liquids. If you have kidney, heart, or liver disease and have to limit fluids, talk with your doctor before you increase the amount of fluids you drink. · Exercise regularly to improve your strength, muscle tone, and balance. Walk if you can. Swimming may be a good choice if you cannot walk easily. · Have your vision and hearing checked each year or any time you notice a change. If you have trouble seeing and hearing, you might not be able to avoid objects and could lose your balance. · Know the side effects of the medicines you take. Ask your doctor or pharmacist whether the medicines you take can affect your balance. Sleeping pills or sedatives can affect your balance. · Limit the amount of alcohol you drink. Alcohol can impair your balance and other senses. · Ask your doctor whether calluses or corns on your feet need to be removed. If you wear loose-fitting shoes because of calluses or corns, you can lose your balance and fall. · Talk to your doctor if you have numbness in your feet. 
Preventing falls at home 
· Remove raised doorway thresholds, throw rugs, and clutter. Repair loose carpet or raised areas in the floor. 
· Move furniture and electrical cords to keep them out of walking paths. 
· Use nonskid floor wax, and wipe up spills right away, especially on ceramic tile floors. 
· If you use a walker or cane, put rubber tips on it. If you use crutches, clean the bottoms of them regularly with an abrasive pad, such as steel wool. 
· Keep your house well lit, especially stairways, porches, and outside walkways. Use night-lights in areas such as hallways and bathrooms. Add extra light switches or use remote switches (such as switches that go on or off when you clap your hands) to make it easier to turn lights on if you have to get up during the night. 
· Install sturdy handrails on stairways. 
· Move items in your cabinets so that the things you use a lot are on the lower shelves (about waist level). 
· Keep a cordless phone and a flashlight with new batteries by your bed. If possible, put a phone in each of the main rooms of your house, or carry a cell phone in case you fall and cannot reach a phone. Or, you can wear a device around your neck or wrist. You push a button that sends a signal for help. 
· Wear low-heeled shoes that fit well and give your feet good support. Use footwear with nonskid soles. Check the heels and soles of your shoes for wear. Repair or replace worn heels or soles. 
· Do not wear socks without shoes on wood floors. 
· Walk on the grass when the sidewalks are slippery. If you live in an area that gets snow and ice in the winter, sprinkle salt on slippery steps and sidewalks. 
Preventing falls in the bath 
· Install grab bars and nonskid mats inside and outside your shower or tub and near the toilet and sinks. 
· Use shower chairs and bath benches. 
 · Use a hand-held shower head that will allow you to sit while showering. · Get into a tub or shower by putting the weaker leg in first. Get out of a tub or shower with your strong side first. 
· Repair loose toilet seats and consider installing a raised toilet seat to make getting on and off the toilet easier. · Keep your bathroom door unlocked while you are in the shower. Where can you learn more? Go to http://www.rios.com/ Enter 0476 79 [id number] 71 in the search box to learn more about \"Preventing Falls: Care Instructions. \" Current as of: April 15, 2020               Content Version: 12.6 © 6393-6147 [employer]. Care instructions adapted under license by [employer] (which disclaims liability or warranty for this information). If you have questions about a medical condition or this instruction, always ask your healthcare professional. [address] any warranty or liability for your use of this information.

## 2021-01-06 NOTE — PROGRESS NOTES
[facility] and [address] at [facility] at [address] [facility], [address] [facility] [address] [facility], [address] [place] [address]   [phone]              Chief Complaint   Patient presents with    Alzheimers    Headache    Vision Change     Current Outpatient Medications   Medication Sig Dispense Refill    donepeziL (ARICEPT) 5 mg tablet Take 1 Tab by mouth nightly. 90 Tab 1    insulin glargine (LANTUS,BASAGLAR) 100 unit/mL (3 mL) inpn by SubCUTAneous route.  levETIRAcetam (KEPPRA) 250 mg tablet TAKE ONE TABLET BY MOUTH TWICE A DAY 60 Tab 0    lisinopriL (PRINIVIL, ZESTRIL) 20 mg tablet TAKE ONE TABLET BY MOUTH DAILY      memantine (Namenda) 5 mg tablet Take 1 Tab by mouth two (2) times a day. 60 Tab 0    acetaminophen (TYLENOL) 325 mg tablet Take 2 Tabs by mouth every six (6) hours as needed for Pain or Fever. 30 Tab 0    balsam peru-castor oiL (VENELEX) ointment Apply  to affected area three (3) times daily. APPLY TO sacrum and buttocks    Nursing, document site in comments 1 Tube 0    glucose 4 gram chewable tablet Take 4 Tabs by mouth as needed (for low blood sugar less than 70). 30 Tab 0    hydrocortisone (HYCORT) 1 % ointment Apply  to affected area two (2) times a day. use thin layer 30 g 0    polyethylene glycol (MIRALAX) 17 gram packet Take 1 Packet by mouth daily as needed for Constipation. First line therapy for constipation 10 Each 0    insulin lispro (HUMALOG) 100 unit/mL injection 4 Units by SubCUTAneous route as needed.         Allergies   Allergen Reactions    Bactrim [Sulfamethoprim] Unknown (comments)    Dilantin [Phenytoin Sodium Extended] Unknown (comments)    Doxycycline Unknown (comments)    Lipitor [Atorvastatin] Myalgia    Pravastatin Myalgia    Sertraline Unknown (comments)    Sulfur Diarrhea    Tegretol [Carbamazepine] Unknown (comments)    Zocor [Simvastatin] Myalgia     Social History     Tobacco Use    Smoking status: Current Every Day Smoker    Smokeless tobacco: Never Used   Substance Use Topics    Alcohol use: No    Drug use: No     78-year-old man comes with his wife today for follow-up regarding Alzheimer's type dementia. Last visit was with our nurse practitioner in September. At that time he was following up from a right parietal intraparenchymal hemorrhage. He also had ischemic left MCA infarct as well as a comm aneurysm. Chart review finds visit with neuro interventional surgery November 9, 2020 where MRA of the head was planned for February. He was put on Keppra prophylactically while hospitalized. . The Pinky Emmer was discontinued at his interventional appointment    He has not had any further stroke symptoms. No sign or symptom of seizure    In terms of his dementia he is maintained on 5 mg of Aricept and he has been intolerant of higher doses. Also on Namenda 5 mg twice daily. Memory is about the same. New issue today and that is of falling as well as headache and visual disturbance. His wife notes that he started to miss  spatial relations. He bent over to pick something up fell and struck his head on the corner of the table. Since then he has had constant headache. He says that he is not seeing things properly. He complains to his wife that he is not seeing things well. They have not seen the eye doctor. Notes from endocrinology Dr. Tyson Rm dated October 20, 2020 where he was seen for follow-up diabetes, chronic kidney disease stage II, hypertension etc.  No major changes noted there. Examination  Visit Vitals  /64 (BP 1 Location: Left arm, BP Patient Position: Sitting)   Pulse (!) 46   Temp 97.3 °F (36.3 °C)   Resp 16   Wt 85.7 kg (189 lb)   SpO2 96%   BMI 27.12 kg/m²   He is awake and alert. He has a skin tear on his right forearm. He has multiple ecchymoses on his upper extremities. No icterus.   Status post cataract surgery bilaterally. Full versions. Visual fields difficult to ascertain. No facial asymmetry. He has no pronation or drift. Resists fully in all extremities. Ataxic left upper extremity. Uses a cane. Oriented to January 5, 1920 and when I asked him about Franck Jay he confirms the year is Franck Jay. He knows the name of the president when I give him the president's first name    Impression/Plan  Dementia of the Alzheimer's type  Continue Aricept 5 mg daily and Namenda 5 mg twice daily    Status post intracerebral hemorrhage and left MCA infarct  Modify modifiable risk factors for stroke including blood pressure and diabetes    Frequent falls  He is currently getting physical therapy    New issue of headache status post head trauma  MRI and MRA of the brain. We will go ahead and do the MRA as well since that scheduled for just a few weeks from now for aneurysm surveillance    Anterior communicating artery aneurysm unruptured  Continue to follow with neuro interventional surgery  MRA as above    Follow after    Luis Carlos Andrews MD      This note was created using voice recognition software. Despite editing, there may be syntax errors.

## 2021-01-06 NOTE — PROGRESS NOTES
Chief Complaint   Patient presents with    Alzheimers    Headache    Vision Change     HAs for the past 3 weeks. States he has been having trouble with vision in eyes and severe itching in right eyes.

## 2021-01-12 ENCOUNTER — HOSPITAL ENCOUNTER (EMERGENCY)
Age: 81
Discharge: HOME | End: 2021-01-12
Attending: EMERGENCY MEDICINE
Payer: MEDICARE

## 2021-01-12 ENCOUNTER — APPOINTMENT (OUTPATIENT)
Dept: CT IMAGING | Age: 81
End: 2021-01-12
Attending: EMERGENCY MEDICINE
Payer: MEDICARE

## 2021-01-12 VITALS
HEART RATE: 42 BPM | TEMPERATURE: 97.8 F | OXYGEN SATURATION: 100 % | WEIGHT: 189 LBS | RESPIRATION RATE: 14 BRPM | SYSTOLIC BLOOD PRESSURE: 179 MMHG | DIASTOLIC BLOOD PRESSURE: 53 MMHG | BODY MASS INDEX: 27.12 KG/M2

## 2021-01-12 DIAGNOSIS — E86.0 DEHYDRATION: ICD-10-CM

## 2021-01-12 DIAGNOSIS — R00.1 BRADYCARDIA: ICD-10-CM

## 2021-01-12 DIAGNOSIS — G44.319 ACUTE POST-TRAUMATIC HEADACHE, NOT INTRACTABLE: Primary | ICD-10-CM

## 2021-01-12 DIAGNOSIS — S06.0X0A CONCUSSION WITHOUT LOSS OF CONSCIOUSNESS, INITIAL ENCOUNTER: ICD-10-CM

## 2021-01-12 LAB
ALBUMIN SERPL-MCNC: 3.1 G/DL (ref 3.5–5)
ALBUMIN/GLOB SERPL: 0.8 {RATIO} (ref 1.1–2.2)
ALP SERPL-CCNC: 90 U/L (ref 45–117)
ALT SERPL-CCNC: 20 U/L (ref 12–78)
ANION GAP SERPL CALC-SCNC: 1 MMOL/L (ref 5–15)
AST SERPL-CCNC: 14 U/L (ref 15–37)
ATRIAL RATE: 41 BPM
BASOPHILS # BLD: 0 K/UL (ref 0–0.1)
BASOPHILS NFR BLD: 0 % (ref 0–1)
BILIRUB SERPL-MCNC: 0.2 MG/DL (ref 0.2–1)
BUN SERPL-MCNC: 36 MG/DL (ref 6–20)
BUN/CREAT SERPL: 23 (ref 12–20)
CALCIUM SERPL-MCNC: 8.8 MG/DL (ref 8.5–10.1)
CALCULATED P AXIS, ECG09: 28 DEGREES
CALCULATED R AXIS, ECG10: -16 DEGREES
CALCULATED T AXIS, ECG11: 16 DEGREES
CHLORIDE SERPL-SCNC: 106 MMOL/L (ref 97–108)
CO2 SERPL-SCNC: 32 MMOL/L (ref 21–32)
CREAT SERPL-MCNC: 1.58 MG/DL (ref 0.7–1.3)
DIAGNOSIS, 93000: NORMAL
DIFFERENTIAL METHOD BLD: ABNORMAL
EOSINOPHIL # BLD: 0.8 K/UL (ref 0–0.4)
EOSINOPHIL NFR BLD: 10 % (ref 0–7)
ERYTHROCYTE [DISTWIDTH] IN BLOOD BY AUTOMATED COUNT: 12.9 % (ref 11.5–14.5)
GLOBULIN SER CALC-MCNC: 3.7 G/DL (ref 2–4)
GLUCOSE SERPL-MCNC: 158 MG/DL (ref 65–100)
HCT VFR BLD AUTO: 38.9 % (ref 36.6–50.3)
HGB BLD-MCNC: 12.4 G/DL (ref 12.1–17)
IMM GRANULOCYTES # BLD AUTO: 0 K/UL (ref 0–0.04)
IMM GRANULOCYTES NFR BLD AUTO: 0 % (ref 0–0.5)
LYMPHOCYTES # BLD: 2.2 K/UL (ref 0.8–3.5)
LYMPHOCYTES NFR BLD: 25 % (ref 12–49)
MAGNESIUM SERPL-MCNC: 2.6 MG/DL (ref 1.6–2.4)
MCH RBC QN AUTO: 31.7 PG (ref 26–34)
MCHC RBC AUTO-ENTMCNC: 31.9 G/DL (ref 30–36.5)
MCV RBC AUTO: 99.5 FL (ref 80–99)
MONOCYTES # BLD: 0.7 K/UL (ref 0–1)
MONOCYTES NFR BLD: 8 % (ref 5–13)
NEUTS SEG # BLD: 4.9 K/UL (ref 1.8–8)
NEUTS SEG NFR BLD: 57 % (ref 32–75)
NRBC # BLD: 0 K/UL (ref 0–0.01)
NRBC BLD-RTO: 0 PER 100 WBC
P-R INTERVAL, ECG05: 206 MS
PLATELET # BLD AUTO: 193 K/UL (ref 150–400)
PMV BLD AUTO: 9.4 FL (ref 8.9–12.9)
POTASSIUM SERPL-SCNC: 4.7 MMOL/L (ref 3.5–5.1)
PROT SERPL-MCNC: 6.8 G/DL (ref 6.4–8.2)
Q-T INTERVAL, ECG07: 456 MS
QRS DURATION, ECG06: 100 MS
QTC CALCULATION (BEZET), ECG08: 376 MS
RBC # BLD AUTO: 3.91 M/UL (ref 4.1–5.7)
SODIUM SERPL-SCNC: 139 MMOL/L (ref 136–145)
VENTRICULAR RATE, ECG03: 41 BPM
WBC # BLD AUTO: 8.6 K/UL (ref 4.1–11.1)

## 2021-01-12 PROCEDURE — 74011250636 HC RX REV CODE- 250/636: Performed by: EMERGENCY MEDICINE

## 2021-01-12 PROCEDURE — 96360 HYDRATION IV INFUSION INIT: CPT

## 2021-01-12 PROCEDURE — 93005 ELECTROCARDIOGRAM TRACING: CPT

## 2021-01-12 PROCEDURE — 85025 COMPLETE CBC W/AUTO DIFF WBC: CPT

## 2021-01-12 PROCEDURE — 74011250637 HC RX REV CODE- 250/637: Performed by: EMERGENCY MEDICINE

## 2021-01-12 PROCEDURE — 80053 COMPREHEN METABOLIC PANEL: CPT

## 2021-01-12 PROCEDURE — 96361 HYDRATE IV INFUSION ADD-ON: CPT

## 2021-01-12 PROCEDURE — 70450 CT HEAD/BRAIN W/O DYE: CPT

## 2021-01-12 PROCEDURE — 36415 COLL VENOUS BLD VENIPUNCTURE: CPT

## 2021-01-12 PROCEDURE — 83735 ASSAY OF MAGNESIUM: CPT

## 2021-01-12 PROCEDURE — 99285 EMERGENCY DEPT VISIT HI MDM: CPT

## 2021-01-12 RX ORDER — BUTALBITAL, ACETAMINOPHEN AND CAFFEINE 50; 325; 40 MG/1; MG/1; MG/1
1 TABLET ORAL
Status: COMPLETED | OUTPATIENT
Start: 2021-01-12 | End: 2021-01-12

## 2021-01-12 RX ORDER — BUTALBITAL, ACETAMINOPHEN AND CAFFEINE 300; 40; 50 MG/1; MG/1; MG/1
1 CAPSULE ORAL
Qty: 15 CAP | Refills: 0 | Status: SHIPPED | OUTPATIENT
Start: 2021-01-12

## 2021-01-12 RX ADMIN — BUTALBITAL, ACETAMINOPHEN, AND CAFFEINE 1 TABLET: 50; 325; 40 TABLET ORAL at 13:09

## 2021-01-12 RX ADMIN — SODIUM CHLORIDE 1000 ML: 9 INJECTION, SOLUTION INTRAVENOUS at 13:15

## 2021-01-12 NOTE — ED NOTES
Per wife not feeling well, very angry, GLF 1 week ago, dx brain aneurysm/ [place], reports personality change.

## 2021-01-12 NOTE — ED PROVIDER NOTES
61-year-old gentleman with history of COPD, dementia, diabetes, CVA, hypertension with most recent stroke this past November with residual left-sided weakness presents to the emergency department today approximately 1 week after a ground-level fall. He tells me that he has been having intermittent headaches since that time. Wife voices a concern of triage that he has been angry more than normal.  Patient endorses that he has had had a behavior change after the fall. He denies any worsening of his weakness. He tells me that he is supposed to have an MRI/MRA this coming Friday for his primary care doctor. The history is provided by the patient and medical records. Headache   This is a new problem. The current episode started more than 1 week ago. The problem has not changed since onset. The pain is located in the generalized region. The pain is moderate. Associated symptoms include nausea. Pertinent negatives include no fever, no chest pressure, no palpitations, no syncope, no shortness of breath, no weakness, no tingling, no dizziness, no visual change and no vomiting.         Past Medical History:   Diagnosis Date    Anxiety disorder     Constipation     Contact dermatitis and eczema due to cause     COPD (chronic obstructive pulmonary disease) ([address] 11/06/2020    Dementia ([address]     Depression     Diabetes ([address]     Falls 4/26/2013    Hearing loss 4/26/2013    Hemorrhagic stroke ([address] 02/11/2019    Hypertension     Memory loss 4/26/2013    Neuropathy     Ringing in ears 4/26/2013    Stroke ([address]     Vision decreased        Past Surgical History:   Procedure Laterality Date    HX CHOLECYSTECTOMY      HX OTHER SURGICAL      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     NEUROLOGICAL PROCEDURE UNLISTED           Family History:   Problem Relation Age of Onset    Dementia Maternal Grandfather     No Known Problems Mother     No Known Problems Father        Social History     Socioeconomic History    Marital status:      Spouse name: Not on file    Number of children: Not on file    Years of education: Not on file    Highest education level: Not on file   Occupational History    Not on file   Social Needs    Financial resource strain: Not on file    Food insecurity     Worry: Not on file     Inability: Not on file    Transportation needs     Medical: Not on file     Non-medical: Not on file   Tobacco Use    Smoking status: Current Every Day Smoker    Smokeless tobacco: Never Used   Substance and Sexual Activity    Alcohol use: No    Drug use: No    Sexual activity: Not on file   Lifestyle    Physical activity     Days per week: Not on file     Minutes per session: Not on file    Stress: Not on file   Relationships    Social connections     Talks on phone: Not on file     Gets together: Not on file     Attends Jainism service: Not on file     Active member of club or organization: Not on file     Attends meetings of clubs or organizations: Not on file     Relationship status: Not on file    Intimate partner violence     Fear of current or ex partner: Not on file     Emotionally abused: Not on file     Physically abused: Not on file     Forced sexual activity: Not on file   Other Topics Concern    Not on file   Social History Narrative    Not on file         ALLERGIES: Bactrim [sulfamethoprim], Dilantin [phenytoin sodium extended], Doxycycline, Lipitor [atorvastatin], Pravastatin, Sertraline, Sulfur, Tegretol [carbamazepine], and Zocor [simvastatin]    Review of Systems   Constitutional: Negative for fatigue and fever. HENT: Negative for sneezing and sore throat. Respiratory: Negative for cough and shortness of breath. Cardiovascular: Negative for chest pain, palpitations, leg swelling and syncope. Gastrointestinal: Positive for nausea. Negative for abdominal pain, diarrhea and vomiting. Genitourinary: Negative for difficulty urinating and dysuria. Musculoskeletal: Negative for arthralgias and myalgias. Skin: Negative for color change and rash. Neurological: Positive for headaches. Negative for dizziness, tingling and weakness. Psychiatric/Behavioral: Negative for agitation and behavioral problems. Vitals:    01/12/21 1049   BP: (!) 183/73   Pulse: (!) 57   Resp: 14   Temp: 97.6 °F (36.4 °C)   SpO2: 99%   Weight: 85.7 kg (189 lb)            Physical Exam  Vitals signs and nursing note reviewed. Constitutional:       General: He is not in acute distress. Appearance: Normal appearance. He is normal weight. He is not ill-appearing, toxic-appearing or diaphoretic. HENT:      Head: Normocephalic and atraumatic. Nose: Nose normal.      Mouth/Throat:      Mouth: Mucous membranes are dry. Pharynx: Oropharynx is clear. Eyes:      Extraocular Movements: Extraocular movements intact. Conjunctiva/sclera: Conjunctivae normal.      Pupils: Pupils are equal, round, and reactive to light. Neck:      Musculoskeletal: Normal range of motion and neck supple. No muscular tenderness. Cardiovascular:      Rate and Rhythm: Regular rhythm. Bradycardia present. Pulses: Normal pulses. Heart sounds: Normal heart sounds. Pulmonary:      Effort: Pulmonary effort is normal. No respiratory distress. Breath sounds: Normal breath sounds. Abdominal:      General: There is no distension. Palpations: Abdomen is soft. Tenderness: There is no abdominal tenderness. There is no guarding or rebound. Musculoskeletal: Normal range of motion. General: No swelling, tenderness, deformity or signs of injury. Right lower leg: No edema. Left lower leg: No edema. Skin:     General: Skin is warm and dry. Capillary Refill: Capillary refill takes less than 2 seconds. Neurological:      General: No focal deficit present. Mental Status: He is alert and oriented to person, place, and time.    Psychiatric: Mood and Affect: Mood normal.         Behavior: Behavior normal.          MDM  Number of Diagnoses or Management Options  Diagnosis management comments: 81-year-old gentleman with history of recent ground-level fall presents as above with headache with some personality changes. He has a history significant for dementia and likely suffering from a concussion complicated by dementia. No significant abnormalities found on CT scan. He does have evidence of dehydration on his labs. Plan to rehydrate and treat his headache. He has a long history of bradycardia. I see no records of him being evaluated by cardiology. He has normal blood pressure without chest pain so will discharge with outpatient cardiology referral.       Amount and/or Complexity of Data Reviewed  Clinical lab tests: reviewed  Tests in the radiology section of CPT®: reviewed  Decide to obtain previous medical records or to obtain history from someone other than the patient: yes           Procedures            Rhythm: Marked sinus bradycardia at a rate of approximately 41 with AZ interval of 206 and incomplete right bundle branch block. Axis: normal.  ST segment:  No concerning ST elevations or depressions. When compared with prior EKGs most recent 8/11/2020 he has been bradycardic with incomplete right bundle. This EKG was interpreted by Melissa Tina MD,ED Provider.

## 2021-01-12 NOTE — ED TRIAGE NOTES
Headache x 3 weeks or longer, recent GLF, feels \"funny\" per patient, difficult to understand people since September. Hx of strokes. Patient poor historian. GLF approx 1 week ago, No LOC. No blood thinners.

## 2021-01-12 NOTE — DISCHARGE INSTRUCTIONS
Please be sure to maintain good hydration at home. You have a history of slow heart rates and we recommend you follow-up with cardiology for further evaluation.

## 2021-01-15 ENCOUNTER — HOSPITAL ENCOUNTER (OUTPATIENT)
Dept: MRI IMAGING | Age: 81
Discharge: HOME | End: 2021-01-15
Attending: PSYCHIATRY & NEUROLOGY
Payer: MEDICARE

## 2021-01-15 VITALS — WEIGHT: 188 LBS | BODY MASS INDEX: 26.98 KG/M2

## 2021-01-15 DIAGNOSIS — R51.9 NEW ONSET OF HEADACHES AFTER AGE 50: ICD-10-CM

## 2021-01-15 DIAGNOSIS — I67.1 ANEURYSM, CEREBRAL, NONRUPTURED: ICD-10-CM

## 2021-01-15 DIAGNOSIS — Z86.73 HISTORY OF STROKE: ICD-10-CM

## 2021-01-15 DIAGNOSIS — S09.90XA TRAUMATIC INJURY OF HEAD, INITIAL ENCOUNTER: ICD-10-CM

## 2021-01-15 DIAGNOSIS — R29.6 FALLS FREQUENTLY: ICD-10-CM

## 2021-01-15 PROCEDURE — 74011250636 HC RX REV CODE- 250/636: Performed by: PSYCHIATRY & NEUROLOGY

## 2021-01-15 PROCEDURE — 70546 MR ANGIOGRAPH HEAD W/O&W/DYE: CPT

## 2021-01-15 PROCEDURE — 70545 MR ANGIOGRAPHY HEAD W/DYE: CPT

## 2021-01-15 PROCEDURE — A9575 INJ GADOTERATE MEGLUMI 0.1ML: HCPCS | Performed by: PSYCHIATRY & NEUROLOGY

## 2021-01-15 PROCEDURE — 77030021566

## 2021-01-15 PROCEDURE — 70551 MRI BRAIN STEM W/O DYE: CPT

## 2021-01-15 RX ORDER — GADOTERATE MEGLUMINE 376.9 MG/ML
17 INJECTION INTRAVENOUS
Status: COMPLETED | OUTPATIENT
Start: 2021-01-15 | End: 2021-01-15

## 2021-01-15 RX ORDER — GADOTERATE MEGLUMINE 376.9 MG/ML
INJECTION INTRAVENOUS
Status: DISCONTINUED
Start: 2021-01-15 | End: 2021-01-16 | Stop reason: HOSPADM

## 2021-01-15 RX ADMIN — GADOTERATE MEGLUMINE 17 ML: 376.9 INJECTION INTRAVENOUS at 14:22

## 2021-01-18 ENCOUNTER — TELEPHONE (OUTPATIENT)
Dept: NEUROLOGY | Age: 81
End: 2021-01-18

## 2021-01-18 NOTE — TELEPHONE ENCOUNTER
Notified we do not discuss results over the phone unless there is something that needs addressed prior to appt. appt scheduled for 2/2/21 with Mayur Mclean.

## 2021-01-18 NOTE — TELEPHONE ENCOUNTER
----- Message from [facility] sent at 1/18/2021 10:40 AM EST -----  Regarding: Dr. Althea Munguia Message/Vendor Calls    Caller's first and last name:  Sj Cabrera (Spouse)      Reason for call:  MRI and MRA results      Callback required yes/no and why:  Y      Best contact number(s):  [phone]      Details to clarify the request:  Mrs. Aldair Acevedo is requesting a call back with the MRI and MRA results from the procedures done on 1/15/21.         [facility]

## 2021-01-19 ENCOUNTER — TELEPHONE (OUTPATIENT)
Dept: NEUROLOGY | Age: 81
End: 2021-01-19

## 2021-01-19 NOTE — TELEPHONE ENCOUNTER
----- Message from Vianney Gonzalez sent at 1/19/2021  3:42 PM EST -----  Regarding: MARGARET Donald/Telephone  Level 1/Escalated Issue      Caller's first and last name and relationship (if not the patient): Abraham Graham (wife)      Best contact number(s): [phone]      What are the symptoms: L eye pain, red, black underneath and spreading into neck      Transfer successful - yes/no (include outcome):Yes, nurse answered and Mrs. Messer transferred       Transfer declined - yes/no (include reason):       Was caller advised to seek appropriate level of care - yes/no: No, pt has already been to ER and wife states if she doesn't have to, she doesn't want to take him back      Details to clarify the request:        Vianney Gonzalez

## 2021-01-19 NOTE — TELEPHONE ENCOUNTER
S/w Mrs. Messer, notified she was told during [address] by Dr. Jazmine Swanson and myself she needs to take pt to eye doctor since he had change since his last c/u with them. She states [facility] told her to go to ED if anything was different with pt's head or eyes. She already took pt to ED on 1/12/21 and states nothing was really done. Advised her to either contact her eye doctor or call [facility] since they had recommended her to go to ED if something changed.

## 2021-02-02 ENCOUNTER — OFFICE VISIT (OUTPATIENT)
Dept: NEUROLOGY | Age: 81
End: 2021-02-02
Payer: MEDICARE

## 2021-02-02 VITALS
RESPIRATION RATE: 16 BRPM | OXYGEN SATURATION: 97 % | SYSTOLIC BLOOD PRESSURE: 160 MMHG | DIASTOLIC BLOOD PRESSURE: 70 MMHG | HEART RATE: 55 BPM

## 2021-02-02 DIAGNOSIS — F03.91 DEMENTIA WITH BEHAVIORAL DISTURBANCE, UNSPECIFIED DEMENTIA TYPE: Primary | ICD-10-CM

## 2021-02-02 DIAGNOSIS — R51.9 INTRACTABLE HEADACHE, UNSPECIFIED CHRONICITY PATTERN, UNSPECIFIED HEADACHE TYPE: ICD-10-CM

## 2021-02-02 PROCEDURE — 3288F FALL RISK ASSESSMENT DOCD: CPT | Performed by: NURSE PRACTITIONER

## 2021-02-02 PROCEDURE — 99214 OFFICE O/P EST MOD 30 MIN: CPT | Performed by: NURSE PRACTITIONER

## 2021-02-02 PROCEDURE — G9717 DOC PT DX DEP/BP F/U NT REQ: HCPCS | Performed by: NURSE PRACTITIONER

## 2021-02-02 PROCEDURE — G8536 NO DOC ELDER MAL SCRN: HCPCS | Performed by: NURSE PRACTITIONER

## 2021-02-02 PROCEDURE — G8427 DOCREV CUR MEDS BY ELIG CLIN: HCPCS | Performed by: NURSE PRACTITIONER

## 2021-02-02 PROCEDURE — G8419 CALC BMI OUT NRM PARAM NOF/U: HCPCS | Performed by: NURSE PRACTITIONER

## 2021-02-02 PROCEDURE — 1100F PTFALLS ASSESS-DOCD GE2>/YR: CPT | Performed by: NURSE PRACTITIONER

## 2021-02-02 RX ORDER — NORTRIPTYLINE HYDROCHLORIDE 10 MG/1
10 CAPSULE ORAL
Qty: 30 CAP | Refills: 5 | Status: SHIPPED | OUTPATIENT
Start: 2021-02-02

## 2021-02-02 NOTE — PROGRESS NOTES
Kellee Bland is a 79 y.o. male who presents with the following  Chief Complaint   Patient presents with    Follow-up     had fall last week went down on knees, \"lightly\" hit head,     Results     MRA/MRI    Sleep Problem     abnormal pattern       HPI         FU for MRI, MRA   Things continue to get better slowly. Did see eye doctor and had scratched cornea so getting drops the eye pain is a lot better. Still having significant pain across the forehead in regard to headache. This is daily. Pressure, full feeling. Fioricet made him feel bad. Not sleeping well. He is anxious, restlessness per wife report. Never used Pamelor     Getting PT at [facility]     Allergies   Allergen Reactions    Bactrim [Sulfamethoprim] Unknown (comments)    Dilantin [Phenytoin Sodium Extended] Unknown (comments)    Doxycycline Unknown (comments)    Lipitor [Atorvastatin] Myalgia    Pravastatin Myalgia    Sertraline Unknown (comments)    Sulfur Diarrhea    Tegretol [Carbamazepine] Unknown (comments)    Zocor [Simvastatin] Myalgia       Current Outpatient Medications   Medication Sig    nortriptyline (PAMELOR) 10 mg capsule Take 1 Cap by mouth nightly.  donepeziL (ARICEPT) 5 mg tablet Take 1 Tab by mouth nightly.  insulin glargine (LANTUS,BASAGLAR) 100 unit/mL (3 mL) inpn by SubCUTAneous route.  lisinopriL (PRINIVIL, ZESTRIL) 20 mg tablet TAKE ONE TABLET BY MOUTH DAILY    memantine (Namenda) 5 mg tablet Take 1 Tab by mouth two (2) times a day.  acetaminophen (TYLENOL) 325 mg tablet Take 2 Tabs by mouth every six (6) hours as needed for Pain or Fever.  balsam peru-castor oiL (VENELEX) ointment Apply  to affected area three (3) times daily. APPLY TO sacrum and buttocks    Nursing, document site in comments    glucose 4 gram chewable tablet Take 4 Tabs by mouth as needed (for low blood sugar less than 70).  hydrocortisone (HYCORT) 1 % ointment Apply  to affected area two (2) times a day.  use thin layer    polyethylene glycol (MIRALAX) 17 gram packet Take 1 Packet by mouth daily as needed for Constipation. First line therapy for constipation    insulin lispro (HUMALOG) 100 unit/mL injection 4 Units by SubCUTAneous route as needed.  butalbital-acetaminophen-caff (Fioricet) -40 mg per capsule Take 1 Cap by mouth every four (4) hours as needed for Headache.  levETIRAcetam (KEPPRA) 250 mg tablet TAKE ONE TABLET BY MOUTH TWICE A DAY     No current facility-administered medications for this visit. Social History     Tobacco Use   Smoking Status Current Every Day Smoker   Smokeless Tobacco Never Used       Past Medical History:   Diagnosis Date    Anxiety disorder     Constipation     Contact dermatitis and eczema due to cause     COPD [facility]chronic obstructive pulmonary disease) ([facility] 11/06/2020    Dementia ([facility]     Depression     Diabetes ([facility]     Falls 4/26/2013    Hearing loss 4/26/2013    Hemorrhagic stroke ([facility] 02/11/2019    Hypertension     Memory loss 4/26/2013    Neuropathy     Ringing in ears 4/26/2013    Stroke ([facility]     Vision decreased        Past Surgical History:   Procedure Laterality Date    HX CHOLECYSTECTOMY      HX OTHER SURGICAL      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     NEUROLOGICAL PROCEDURE UNLISTED         Family History   Problem Relation Age of Onset    Dementia Maternal Grandfather     No Known Problems Mother     No Known Problems Father        Social History     Socioeconomic History    Marital status:      Spouse name: Not on file    Number of children: Not on file    Years of education: Not on file    Highest education level: Not on file   Tobacco Use    Smoking status: Current Every Day Smoker    Smokeless tobacco: Never Used   Substance and Sexual Activity    Alcohol use: No    Drug use: No       ROS    Remainder of comprehensive review is negative.      Physical Exam :    Visit Vitals  BP (!) 160/70 (BP 1 Location: Left upper arm, BP Patient Position: Sitting)   Pulse (!) 55   Resp 16   SpO2 97%             Results for orders placed or performed during the hospital encounter of 01/12/21   CBC WITH AUTOMATED DIFF   Result Value Ref Range    WBC 8.6 4.1 - 11.1 K/uL    RBC 3.91 (L) 4.10 - 5.70 M/uL    HGB 12.4 12.1 - 17.0 g/dL    HCT 38.9 36.6 - 50.3 %    MCV 99.5 (H) 80.0 - 99.0 FL    MCH 31.7 26.0 - 34.0 PG    MCHC 31.9 30.0 - 36.5 g/dL    RDW 12.9 11.5 - 14.5 %    PLATELET 055 681 - 916 K/uL    MPV 9.4 8.9 - 12.9 FL    NRBC 0.0 0  WBC    ABSOLUTE NRBC 0.00 0.00 - 0.01 K/uL    NEUTROPHILS 57 32 - 75 %    LYMPHOCYTES 25 12 - 49 %    MONOCYTES 8 5 - 13 %    EOSINOPHILS 10 (H) 0 - 7 %    BASOPHILS 0 0 - 1 %    IMMATURE GRANULOCYTES 0 0.0 - 0.5 %    ABS. NEUTROPHILS 4.9 1.8 - 8.0 K/UL    ABS. LYMPHOCYTES 2.2 0.8 - 3.5 K/UL    ABS. MONOCYTES 0.7 0.0 - 1.0 K/UL    ABS. EOSINOPHILS 0.8 (H) 0.0 - 0.4 K/UL    ABS. BASOPHILS 0.0 0.0 - 0.1 K/UL    ABS. IMM. GRANS. 0.0 0.00 - 0.04 K/UL    DF AUTOMATED     METABOLIC PANEL, COMPREHENSIVE   Result Value Ref Range    Sodium 139 136 - 145 mmol/L    Potassium 4.7 3.5 - 5.1 mmol/L    Chloride 106 97 - 108 mmol/L    CO2 32 21 - 32 mmol/L    Anion gap 1 (L) 5 - 15 mmol/L    Glucose 158 (H) 65 - 100 mg/dL    BUN 36 (H) 6 - 20 MG/DL    Creatinine 1.58 (H) 0.70 - 1.30 MG/DL    BUN/Creatinine ratio 23 (H) 12 - 20      GFR est AA 52 (L) >60 ml/min/1.73m2    GFR est non-AA 43 (L) >60 ml/min/1.73m2    Calcium 8.8 8.5 - 10.1 MG/DL    Bilirubin, total 0.2 0.2 - 1.0 MG/DL    ALT (SGPT) 20 12 - 78 U/L    AST (SGOT) 14 (L) 15 - 37 U/L    Alk.  phosphatase 90 45 - 117 U/L    Protein, total 6.8 6.4 - 8.2 g/dL    Albumin 3.1 (L) 3.5 - 5.0 g/dL    Globulin 3.7 2.0 - 4.0 g/dL    A-G Ratio 0.8 (L) 1.1 - 2.2     MAGNESIUM   Result Value Ref Range    Magnesium 2.6 (H) 1.6 - 2.4 mg/dL   EKG, 12 LEAD, INITIAL   Result Value Ref Range    Ventricular Rate 41 BPM    Atrial Rate 41 BPM    P-R Interval 206 ms    QRS Duration 100 ms    Q-T Interval 456 ms    QTC Calculation (Bezet) 376 ms    Calculated P Axis 28 degrees    Calculated R Axis -16 degrees    Calculated T Axis 16 degrees    Diagnosis       Marked sinus bradycardia  Incomplete right bundle branch block  Septal infarct (cited on or before 12-JAN-2021)  Inferior infarct , age undetermined  Abnormal ECG  When compared with ECG of 11-AUG-2020 22:09,  premature atrial complexes are no longer present  Questionable change in initial forces of Anteroseptal leads  Confirmed by Adeline Miguel ([id number]) on 1/12/2021 4:46:25 PM         Orders Placed This Encounter    nortriptyline (PAMELOR) 10 mg capsule     Sig: Take 1 Cap by mouth nightly. Dispense:  30 Cap     Refill:  5       No diagnosis found. Discussed MRI and MRA in full. Discussed treatments. Keep with PT for now. Try low dose Pamelor 10 mg for sleep, HA, and mood. Discussed side effects. no other questions.                This note will not be viewable in MyChart

## 2021-03-16 ENCOUNTER — OFFICE VISIT (OUTPATIENT)
Dept: NEUROLOGY | Age: 81
End: 2021-03-16
Payer: MEDICARE

## 2021-03-16 VITALS
BODY MASS INDEX: 26.98 KG/M2 | WEIGHT: 188 LBS | DIASTOLIC BLOOD PRESSURE: 68 MMHG | OXYGEN SATURATION: 96 % | SYSTOLIC BLOOD PRESSURE: 168 MMHG | RESPIRATION RATE: 14 BRPM | TEMPERATURE: 97 F | HEART RATE: 50 BPM

## 2021-03-16 DIAGNOSIS — G30.1 LATE ONSET ALZHEIMER'S DISEASE WITH BEHAVIORAL DISTURBANCE (HCC): Primary | ICD-10-CM

## 2021-03-16 DIAGNOSIS — I67.1 ANEURYSM, CEREBRAL, NONRUPTURED: ICD-10-CM

## 2021-03-16 DIAGNOSIS — Z86.73 HISTORY OF STROKE: ICD-10-CM

## 2021-03-16 DIAGNOSIS — F02.818 LATE ONSET ALZHEIMER'S DISEASE WITH BEHAVIORAL DISTURBANCE (HCC): Primary | ICD-10-CM

## 2021-03-16 DIAGNOSIS — R26.9 GAIT ABNORMALITY: ICD-10-CM

## 2021-03-16 PROCEDURE — 3288F FALL RISK ASSESSMENT DOCD: CPT | Performed by: PSYCHIATRY & NEUROLOGY

## 2021-03-16 PROCEDURE — G8419 CALC BMI OUT NRM PARAM NOF/U: HCPCS | Performed by: PSYCHIATRY & NEUROLOGY

## 2021-03-16 PROCEDURE — G8536 NO DOC ELDER MAL SCRN: HCPCS | Performed by: PSYCHIATRY & NEUROLOGY

## 2021-03-16 PROCEDURE — G8427 DOCREV CUR MEDS BY ELIG CLIN: HCPCS | Performed by: PSYCHIATRY & NEUROLOGY

## 2021-03-16 PROCEDURE — 99214 OFFICE O/P EST MOD 30 MIN: CPT | Performed by: PSYCHIATRY & NEUROLOGY

## 2021-03-16 PROCEDURE — G9717 DOC PT DX DEP/BP F/U NT REQ: HCPCS | Performed by: PSYCHIATRY & NEUROLOGY

## 2021-03-16 PROCEDURE — 1100F PTFALLS ASSESS-DOCD GE2>/YR: CPT | Performed by: PSYCHIATRY & NEUROLOGY

## 2021-03-16 NOTE — PROGRESS NOTES
Chief Complaint   Patient presents with    Dementia     Wife states pt has been having a lot of itching since starting nortriptyline. Derm was not able to pinpoint what what causing the itching.

## 2021-03-16 NOTE — PROGRESS NOTES
[facility] and [address] at [facility] at [address] [facility], [address] [facility] [address] [facility], [address] [place] [address]   [phone]              Chief Complaint   Patient presents with    Dementia     Current Outpatient Medications   Medication Sig Dispense Refill    nortriptyline (PAMELOR) 10 mg capsule Take 1 Cap by mouth nightly. 30 Cap 5    butalbital-acetaminophen-caff (Fioricet) -40 mg per capsule Take 1 Cap by mouth every four (4) hours as needed for Headache. 15 Cap 0    donepeziL (ARICEPT) 5 mg tablet Take 1 Tab by mouth nightly. 90 Tab 1    insulin glargine (LANTUS,BASAGLAR) 100 unit/mL (3 mL) inpn by SubCUTAneous route.  levETIRAcetam (KEPPRA) 250 mg tablet TAKE ONE TABLET BY MOUTH TWICE A DAY 60 Tab 0    lisinopriL (PRINIVIL, ZESTRIL) 20 mg tablet TAKE ONE TABLET BY MOUTH DAILY      memantine (Namenda) 5 mg tablet Take 1 Tab by mouth two (2) times a day. 60 Tab 0    acetaminophen (TYLENOL) 325 mg tablet Take 2 Tabs by mouth every six (6) hours as needed for Pain or Fever. 30 Tab 0    balsam peru-castor oiL (VENELEX) ointment Apply  to affected area three (3) times daily. APPLY TO sacrum and buttocks    Nursing, document site in comments 1 Tube 0    glucose 4 gram chewable tablet Take 4 Tabs by mouth as needed (for low blood sugar less than 70). 30 Tab 0    hydrocortisone (HYCORT) 1 % ointment Apply  to affected area two (2) times a day. use thin layer 30 g 0    polyethylene glycol (MIRALAX) 17 gram packet Take 1 Packet by mouth daily as needed for Constipation. First line therapy for constipation 10 Each 0    insulin lispro (HUMALOG) 100 unit/mL injection 4 Units by SubCUTAneous route as needed.         Allergies   Allergen Reactions    Bactrim [Sulfamethoprim] Unknown (comments)    Dilantin [Phenytoin Sodium Extended] Unknown (comments)    Doxycycline Unknown (comments)    Lipitor [Atorvastatin] Myalgia    Pravastatin Myalgia    Sertraline Unknown (comments)    Sulfur Diarrhea    Tegretol [Carbamazepine] Unknown (comments)    Zocor [Simvastatin] Myalgia     Social History     Tobacco Use    Smoking status: Current Every Day Smoker    Smokeless tobacco: Never Used   Substance Use Topics    Alcohol use: No    Drug use: No     72-year-old man with Alzheimer's type dementia comes for follow-up. He also has history of left MCA infarct and intracerebral hemorrhage. Has unruptured LISA aneurysm. When I saw him last followed up MRI and MRA. Those results demonstrate no significant change. The outpouching/aneurysm was not well seen secondary to motion artifact. He then saw our nurse practitioner in February with complaints of difficulty sleeping and was started on low-dose Pamelor. He was also having headache and it was felt the Pamelor would help that. He was started on 10 mg. Emergency department visit reviewed from January 12 the patient came in after a fall. CT head of the spine reviewed and unremarkable. [facility] where diagnosis was that of dermatitis. He was seen by Dr. Amina Duong. Today he is with his wifeand they report she believes he is allergic to the Pamelor because since he has been taking it he has been itching. [facility] and dermatology was unable to say why he was itching but thought that maybe there was an ingredient in the Pamelor that was similar to some of the other medicines to which he is allergic. He continues to decline. He is incontinent. In brief 24/7 now. Wife had several questions today regarding dementia specifically Alzheimer's decline his frustration etc.  We discussed that today. Also discussed other questions. No seizure or occult seizure symptom.     Examination  Visit Vitals  BP (!) 192/70 (BP 1 Location: Left upper arm, BP Patient Position: Sitting, BP Cuff Size: Small infant)   Pulse (!) 50   Temp 97 °F (36.1 °C)   Resp 14   Wt 85.3 kg (188 lb)   SpO2 96%   BMI 26.98 kg/m²   Pleasant gentleman. Awake and alert. Appears well. A bit imbalanced without a stick. 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 Capo Delgadlilo his wife will message us and we will arrange follow-up accordingly    Rosario Moraes MD        This note was created using voice recognition software. Despite editing, there may be syntax errors.

## 2021-03-27 DIAGNOSIS — F02.818 LATE ONSET ALZHEIMER'S DISEASE WITH BEHAVIORAL DISTURBANCE (HCC): ICD-10-CM

## 2021-03-27 DIAGNOSIS — G30.1 LATE ONSET ALZHEIMER'S DISEASE WITH BEHAVIORAL DISTURBANCE (HCC): ICD-10-CM

## 2021-03-29 RX ORDER — MEMANTINE HYDROCHLORIDE 5 MG/1
TABLET ORAL
Qty: 60 TAB | Refills: 4 | Status: SHIPPED | OUTPATIENT
Start: 2021-03-29

## 2021-05-26 DIAGNOSIS — F02.818 LATE ONSET ALZHEIMER'S DISEASE WITH BEHAVIORAL DISTURBANCE (HCC): ICD-10-CM

## 2021-05-26 DIAGNOSIS — G30.1 LATE ONSET ALZHEIMER'S DISEASE WITH BEHAVIORAL DISTURBANCE (HCC): ICD-10-CM

## 2021-05-27 RX ORDER — DONEPEZIL HYDROCHLORIDE 5 MG/1
TABLET, FILM COATED ORAL
Qty: 90 TABLET | Refills: 0 | Status: SHIPPED | OUTPATIENT
Start: 2021-05-27

## 2022-03-18 PROBLEM — I67.1 ANTERIOR COMMUNICATING ARTERY ANEURYSM: Status: ACTIVE | Noted: 2020-08-08

## 2022-03-19 PROBLEM — J44.9 COPD (CHRONIC OBSTRUCTIVE PULMONARY DISEASE) (HCC): Status: ACTIVE | Noted: 2020-11-06

## 2022-03-20 PROBLEM — I61.9 ICH (INTRACEREBRAL HEMORRHAGE) (HCC): Status: ACTIVE | Noted: 2020-08-06

## 2023-05-24 RX ORDER — BUTALBITAL, ACETAMINOPHEN AND CAFFEINE 300; 40; 50 MG/1; MG/1; MG/1
1 CAPSULE ORAL EVERY 4 HOURS PRN
COMMUNITY
Start: 2021-01-12

## 2023-05-24 RX ORDER — NORTRIPTYLINE HYDROCHLORIDE 10 MG/1
10 CAPSULE ORAL
COMMUNITY
Start: 2021-02-02

## 2023-05-24 RX ORDER — ACETAMINOPHEN 325 MG/1
650 TABLET ORAL EVERY 6 HOURS PRN
COMMUNITY
Start: 2020-08-14

## 2023-05-24 RX ORDER — LISINOPRIL 20 MG/1
1 TABLET ORAL DAILY
COMMUNITY

## 2023-05-24 RX ORDER — DONEPEZIL HYDROCHLORIDE 5 MG/1
1 TABLET, FILM COATED ORAL NIGHTLY
COMMUNITY
Start: 2021-05-27

## 2023-05-24 RX ORDER — LEVETIRACETAM 250 MG/1
1 TABLET ORAL 2 TIMES DAILY
COMMUNITY
Start: 2020-11-01

## 2023-05-24 RX ORDER — DIAPER,BRIEF,INFANT-TODD,DISP
EACH MISCELLANEOUS 2 TIMES DAILY
COMMUNITY
Start: 2020-08-14

## 2023-05-24 RX ORDER — MEMANTINE HYDROCHLORIDE 5 MG/1
1 TABLET ORAL 2 TIMES DAILY
COMMUNITY
Start: 2021-03-29

## 2023-05-24 RX ORDER — POLYETHYLENE GLYCOL 3350 17 G/17G
17 POWDER, FOR SOLUTION ORAL DAILY PRN
COMMUNITY
Start: 2020-08-14

## 2023-05-24 RX ORDER — INSULIN LISPRO 100 [IU]/ML
4 INJECTION, SOLUTION INTRAVENOUS; SUBCUTANEOUS PRN
COMMUNITY

## 2023-05-24 RX ORDER — INSULIN GLARGINE 100 [IU]/ML
INJECTION, SOLUTION SUBCUTANEOUS
COMMUNITY